# Patient Record
Sex: FEMALE | Race: WHITE | NOT HISPANIC OR LATINO | URBAN - METROPOLITAN AREA
[De-identification: names, ages, dates, MRNs, and addresses within clinical notes are randomized per-mention and may not be internally consistent; named-entity substitution may affect disease eponyms.]

---

## 2017-01-13 ENCOUNTER — EMERGENCY (EMERGENCY)
Facility: HOSPITAL | Age: 67
LOS: 0 days | Discharge: HOME | End: 2017-01-13
Admitting: EMERGENCY MEDICINE
Payer: MEDICARE

## 2017-01-13 DIAGNOSIS — Z79.01 LONG TERM (CURRENT) USE OF ANTICOAGULANTS: ICD-10-CM
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
## 2017-01-13 DIAGNOSIS — S00.81XA ABRASION OF OTHER PART OF HEAD, INITIAL ENCOUNTER: ICD-10-CM

## 2017-01-13 DIAGNOSIS — Z91.81 HISTORY OF FALLING: ICD-10-CM

## 2017-01-13 DIAGNOSIS — Z86.73 PERSONAL HISTORY OF TRANSIENT ISCHEMIC ATTACK (TIA), AND CEREBRAL INFARCTION WITHOUT RESIDUAL DEFICITS: ICD-10-CM
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
## 2017-01-13 PROCEDURE — 70450 CT HEAD/BRAIN W/O DYE: CPT | Mod: 26

## 2017-01-13 PROCEDURE — 72125 CT NECK SPINE W/O DYE: CPT | Mod: 26

## 2017-01-13 PROCEDURE — 99285 EMERGENCY DEPT VISIT HI MDM: CPT | Mod: 25

## 2017-02-14 ENCOUNTER — APPOINTMENT (OUTPATIENT)
Dept: NEUROLOGY | Facility: CLINIC | Age: 67
End: 2017-02-14

## 2017-02-14 VITALS
RESPIRATION RATE: 16 BRPM | WEIGHT: 210 LBS | SYSTOLIC BLOOD PRESSURE: 130 MMHG | DIASTOLIC BLOOD PRESSURE: 70 MMHG | BODY MASS INDEX: 32.96 KG/M2 | HEART RATE: 72 BPM | HEIGHT: 67 IN

## 2017-02-14 DIAGNOSIS — Z86.79 PERSONAL HISTORY OF OTHER DISEASES OF THE CIRCULATORY SYSTEM: ICD-10-CM

## 2017-02-14 DIAGNOSIS — Z87.898 PERSONAL HISTORY OF OTHER SPECIFIED CONDITIONS: ICD-10-CM

## 2017-02-14 DIAGNOSIS — Z87.891 PERSONAL HISTORY OF NICOTINE DEPENDENCE: ICD-10-CM

## 2017-02-14 DIAGNOSIS — I50.32 CHRONIC DIASTOLIC (CONGESTIVE) HEART FAILURE: ICD-10-CM

## 2017-02-14 DIAGNOSIS — J96.12 CHRONIC RESPIRATORY FAILURE WITH HYPERCAPNIA: ICD-10-CM

## 2017-02-14 DIAGNOSIS — Z86.39 PERSONAL HISTORY OF OTHER ENDOCRINE, NUTRITIONAL AND METABOLIC DISEASE: ICD-10-CM

## 2017-02-14 DIAGNOSIS — Z82.3 FAMILY HISTORY OF STROKE: ICD-10-CM

## 2017-02-14 DIAGNOSIS — Z77.29 CONTACT WITH AND (SUSPECTED) EXPOSURE TO OTHER HAZARDOUS SUBSTANCES: ICD-10-CM

## 2017-02-14 RX ORDER — LISINOPRIL 20 MG/1
20 TABLET ORAL
Refills: 0 | Status: ACTIVE | COMMUNITY

## 2017-02-14 RX ORDER — LEVOTHYROXINE SODIUM 0.12 MG/1
125 TABLET ORAL
Refills: 0 | Status: ACTIVE | COMMUNITY

## 2017-02-14 RX ORDER — SACUBITRIL AND VALSARTAN 49; 51 MG/1; MG/1
49-51 TABLET, FILM COATED ORAL
Qty: 60 | Refills: 0 | Status: ACTIVE | COMMUNITY
Start: 2017-02-10

## 2017-02-14 RX ORDER — ATORVASTATIN CALCIUM 40 MG/1
40 TABLET, FILM COATED ORAL
Refills: 0 | Status: ACTIVE | COMMUNITY

## 2017-02-14 RX ORDER — ASPIRIN 81 MG
81 TABLET, DELAYED RELEASE (ENTERIC COATED) ORAL
Refills: 0 | Status: ACTIVE | COMMUNITY

## 2017-02-14 RX ORDER — LEVOTHYROXINE SODIUM 0.15 MG/1
150 TABLET ORAL
Qty: 30 | Refills: 0 | Status: DISCONTINUED | COMMUNITY
Start: 2016-10-14

## 2017-02-14 RX ORDER — ALBUTEROL 90 MCG
90 AEROSOL (GRAM) INHALATION
Refills: 0 | Status: ACTIVE | COMMUNITY

## 2017-02-14 RX ORDER — FUROSEMIDE 40 MG/1
40 TABLET ORAL
Refills: 0 | Status: DISCONTINUED | COMMUNITY
End: 2017-02-14

## 2017-02-14 RX ORDER — CLOPIDOGREL 75 MG/1
75 TABLET, FILM COATED ORAL
Refills: 0 | Status: DISCONTINUED | COMMUNITY
End: 2017-02-14

## 2017-02-14 RX ORDER — ALBUTEROL SULFATE 90 UG/1
108 (90 BASE) AEROSOL, METERED RESPIRATORY (INHALATION)
Qty: 18 | Refills: 0 | Status: ACTIVE | COMMUNITY
Start: 2016-10-24

## 2017-02-14 RX ORDER — CARVEDILOL 3.12 MG/1
3.12 TABLET, FILM COATED ORAL
Refills: 0 | Status: ACTIVE | COMMUNITY

## 2017-02-14 RX ORDER — LEVOTHYROXINE SODIUM 0.17 MG/1
175 TABLET ORAL
Qty: 30 | Refills: 0 | Status: ACTIVE | COMMUNITY
Start: 2016-10-25

## 2017-02-14 RX ORDER — ROSUVASTATIN CALCIUM 20 MG/1
20 TABLET, FILM COATED ORAL
Qty: 30 | Refills: 0 | Status: ACTIVE | COMMUNITY
Start: 2017-01-03

## 2017-02-14 RX ORDER — CARVEDILOL 6.25 MG/1
6.25 TABLET, FILM COATED ORAL
Qty: 60 | Refills: 0 | Status: ACTIVE | COMMUNITY
Start: 2017-01-26

## 2017-02-14 RX ORDER — WARFARIN 5 MG/1
5 TABLET ORAL
Qty: 30 | Refills: 0 | Status: ACTIVE | COMMUNITY
Start: 2017-01-03

## 2017-04-19 ENCOUNTER — APPOINTMENT (OUTPATIENT)
Dept: CARDIOLOGY | Facility: CLINIC | Age: 67
End: 2017-04-19

## 2017-04-27 ENCOUNTER — APPOINTMENT (OUTPATIENT)
Dept: CARDIOLOGY | Facility: CLINIC | Age: 67
End: 2017-04-27

## 2017-05-01 ENCOUNTER — APPOINTMENT (OUTPATIENT)
Dept: CARDIOLOGY | Facility: CLINIC | Age: 67
End: 2017-05-01

## 2017-05-04 ENCOUNTER — APPOINTMENT (OUTPATIENT)
Dept: CARDIOLOGY | Facility: CLINIC | Age: 67
End: 2017-05-04

## 2017-05-04 DIAGNOSIS — Z79.01 ENCOUNTER FOR THERAPEUTIC DRUG LVL MONITORING: ICD-10-CM

## 2017-05-04 DIAGNOSIS — Z51.81 ENCOUNTER FOR THERAPEUTIC DRUG LVL MONITORING: ICD-10-CM

## 2017-05-12 ENCOUNTER — APPOINTMENT (OUTPATIENT)
Dept: CARDIOLOGY | Facility: CLINIC | Age: 67
End: 2017-05-12

## 2017-06-26 ENCOUNTER — INPATIENT (INPATIENT)
Facility: HOSPITAL | Age: 67
LOS: 3 days | Discharge: HOME HEALTH | End: 2017-06-30
Attending: HOSPITALIST | Admitting: HOSPITALIST
Payer: MEDICARE

## 2017-06-26 VITALS
DIASTOLIC BLOOD PRESSURE: 92 MMHG | RESPIRATION RATE: 20 BRPM | HEIGHT: 67 IN | WEIGHT: 199.96 LBS | HEART RATE: 80 BPM | SYSTOLIC BLOOD PRESSURE: 226 MMHG | TEMPERATURE: 98 F | OXYGEN SATURATION: 97 %

## 2017-06-26 DIAGNOSIS — Z98.891 HISTORY OF UTERINE SCAR FROM PREVIOUS SURGERY: Chronic | ICD-10-CM

## 2017-06-26 DIAGNOSIS — I25.10 ATHEROSCLEROTIC HEART DISEASE OF NATIVE CORONARY ARTERY WITHOUT ANGINA PECTORIS: ICD-10-CM

## 2017-06-26 LAB
ANION GAP SERPL CALC-SCNC: 6 MMOL/L — SIGNIFICANT CHANGE UP (ref 5–17)
APTT BLD: 33.3 SEC — SIGNIFICANT CHANGE UP (ref 27.5–37.4)
BLD GP AB SCN SERPL QL: SIGNIFICANT CHANGE UP
BUN SERPL-MCNC: 17 MG/DL — SIGNIFICANT CHANGE UP (ref 7–23)
CALCIUM SERPL-MCNC: 10.5 MG/DL — HIGH (ref 8.5–10.1)
CHLORIDE SERPL-SCNC: 102 MMOL/L — SIGNIFICANT CHANGE UP (ref 96–108)
CO2 SERPL-SCNC: 28 MMOL/L — SIGNIFICANT CHANGE UP (ref 22–31)
CREAT SERPL-MCNC: 0.75 MG/DL — SIGNIFICANT CHANGE UP (ref 0.5–1.3)
GLUCOSE SERPL-MCNC: 113 MG/DL — HIGH (ref 70–99)
HCT VFR BLD CALC: 44 % — SIGNIFICANT CHANGE UP (ref 34.5–45)
HGB BLD-MCNC: 14.7 G/DL — SIGNIFICANT CHANGE UP (ref 11.5–15.5)
INR BLD: 1.07 RATIO — SIGNIFICANT CHANGE UP (ref 0.88–1.16)
MCHC RBC-ENTMCNC: 29.6 PG — SIGNIFICANT CHANGE UP (ref 27–34)
MCHC RBC-ENTMCNC: 33.5 GM/DL — SIGNIFICANT CHANGE UP (ref 32–36)
MCV RBC AUTO: 88.4 FL — SIGNIFICANT CHANGE UP (ref 80–100)
PLATELET # BLD AUTO: 390 K/UL — SIGNIFICANT CHANGE UP (ref 150–400)
POTASSIUM SERPL-MCNC: 4.4 MMOL/L — SIGNIFICANT CHANGE UP (ref 3.5–5.3)
POTASSIUM SERPL-SCNC: 4.4 MMOL/L — SIGNIFICANT CHANGE UP (ref 3.5–5.3)
PROTHROM AB SERPL-ACNC: 11.6 SEC — SIGNIFICANT CHANGE UP (ref 9.8–12.7)
RBC # BLD: 4.97 M/UL — SIGNIFICANT CHANGE UP (ref 3.8–5.2)
RBC # FLD: 12.6 % — SIGNIFICANT CHANGE UP (ref 10.3–14.5)
SODIUM SERPL-SCNC: 136 MMOL/L — SIGNIFICANT CHANGE UP (ref 135–145)
TYPE + AB SCN PNL BLD: SIGNIFICANT CHANGE UP
WBC # BLD: 9.9 K/UL — SIGNIFICANT CHANGE UP (ref 3.8–10.5)
WBC # FLD AUTO: 9.9 K/UL — SIGNIFICANT CHANGE UP (ref 3.8–10.5)

## 2017-06-26 PROCEDURE — 70547 MR ANGIOGRAPHY NECK W/O DYE: CPT | Mod: 26

## 2017-06-26 PROCEDURE — 70450 CT HEAD/BRAIN W/O DYE: CPT | Mod: 26

## 2017-06-26 PROCEDURE — 70551 MRI BRAIN STEM W/O DYE: CPT | Mod: 26

## 2017-06-26 PROCEDURE — 93010 ELECTROCARDIOGRAM REPORT: CPT

## 2017-06-26 PROCEDURE — 99291 CRITICAL CARE FIRST HOUR: CPT

## 2017-06-26 RX ORDER — LISINOPRIL 2.5 MG/1
20 TABLET ORAL DAILY
Qty: 0 | Refills: 0 | Status: DISCONTINUED | OUTPATIENT
Start: 2017-06-26 | End: 2017-06-26

## 2017-06-26 RX ORDER — ATORVASTATIN CALCIUM 80 MG/1
80 TABLET, FILM COATED ORAL AT BEDTIME
Qty: 0 | Refills: 0 | Status: DISCONTINUED | OUTPATIENT
Start: 2017-06-26 | End: 2017-06-30

## 2017-06-26 RX ORDER — ACETAMINOPHEN 500 MG
650 TABLET ORAL EVERY 6 HOURS
Qty: 0 | Refills: 0 | Status: DISCONTINUED | OUTPATIENT
Start: 2017-06-26 | End: 2017-06-30

## 2017-06-26 RX ORDER — ASPIRIN/CALCIUM CARB/MAGNESIUM 324 MG
325 TABLET ORAL DAILY
Qty: 0 | Refills: 0 | Status: DISCONTINUED | OUTPATIENT
Start: 2017-06-26 | End: 2017-06-30

## 2017-06-26 RX ORDER — FUROSEMIDE 40 MG
20 TABLET ORAL DAILY
Qty: 0 | Refills: 0 | Status: DISCONTINUED | OUTPATIENT
Start: 2017-06-26 | End: 2017-06-30

## 2017-06-26 RX ORDER — SACUBITRIL AND VALSARTAN 24; 26 MG/1; MG/1
1 TABLET, FILM COATED ORAL
Qty: 0 | Refills: 0 | Status: DISCONTINUED | OUTPATIENT
Start: 2017-06-26 | End: 2017-06-30

## 2017-06-26 RX ORDER — LABETALOL HCL 100 MG
10 TABLET ORAL ONCE
Qty: 0 | Refills: 0 | Status: COMPLETED | OUTPATIENT
Start: 2017-06-26 | End: 2017-06-26

## 2017-06-26 RX ORDER — HYDRALAZINE HCL 50 MG
10 TABLET ORAL ONCE
Qty: 0 | Refills: 0 | Status: COMPLETED | OUTPATIENT
Start: 2017-06-26 | End: 2017-06-26

## 2017-06-26 RX ORDER — WARFARIN SODIUM 2.5 MG/1
5 TABLET ORAL DAILY
Qty: 0 | Refills: 0 | Status: DISCONTINUED | OUTPATIENT
Start: 2017-06-26 | End: 2017-06-27

## 2017-06-26 RX ORDER — ALBUTEROL 90 UG/1
2 AEROSOL, METERED ORAL EVERY 6 HOURS
Qty: 0 | Refills: 0 | Status: DISCONTINUED | OUTPATIENT
Start: 2017-06-26 | End: 2017-06-30

## 2017-06-26 RX ORDER — CLOPIDOGREL BISULFATE 75 MG/1
75 TABLET, FILM COATED ORAL DAILY
Qty: 0 | Refills: 0 | Status: DISCONTINUED | OUTPATIENT
Start: 2017-06-26 | End: 2017-06-30

## 2017-06-26 RX ORDER — SODIUM CHLORIDE 9 MG/ML
1000 INJECTION INTRAMUSCULAR; INTRAVENOUS; SUBCUTANEOUS
Qty: 0 | Refills: 0 | Status: DISCONTINUED | OUTPATIENT
Start: 2017-06-26 | End: 2017-06-26

## 2017-06-26 RX ORDER — CARVEDILOL PHOSPHATE 80 MG/1
6.25 CAPSULE, EXTENDED RELEASE ORAL EVERY 12 HOURS
Qty: 0 | Refills: 0 | Status: DISCONTINUED | OUTPATIENT
Start: 2017-06-26 | End: 2017-06-30

## 2017-06-26 RX ORDER — LEVOTHYROXINE SODIUM 125 MCG
175 TABLET ORAL DAILY
Qty: 0 | Refills: 0 | Status: DISCONTINUED | OUTPATIENT
Start: 2017-06-26 | End: 2017-06-30

## 2017-06-26 RX ADMIN — Medication 650 MILLIGRAM(S): at 17:27

## 2017-06-26 RX ADMIN — Medication 10 MILLIGRAM(S): at 16:41

## 2017-06-26 RX ADMIN — SACUBITRIL AND VALSARTAN 1 TABLET(S): 24; 26 TABLET, FILM COATED ORAL at 12:31

## 2017-06-26 RX ADMIN — CARVEDILOL PHOSPHATE 6.25 MILLIGRAM(S): 80 CAPSULE, EXTENDED RELEASE ORAL at 12:31

## 2017-06-26 RX ADMIN — Medication 10 MILLIGRAM(S): at 09:52

## 2017-06-26 RX ADMIN — WARFARIN SODIUM 5 MILLIGRAM(S): 2.5 TABLET ORAL at 22:36

## 2017-06-26 RX ADMIN — Medication 175 MICROGRAM(S): at 17:38

## 2017-06-26 RX ADMIN — Medication 325 MILLIGRAM(S): at 16:23

## 2017-06-26 RX ADMIN — ATORVASTATIN CALCIUM 80 MILLIGRAM(S): 80 TABLET, FILM COATED ORAL at 22:36

## 2017-06-26 RX ADMIN — CLOPIDOGREL BISULFATE 75 MILLIGRAM(S): 75 TABLET, FILM COATED ORAL at 16:23

## 2017-06-26 NOTE — H&P ADULT - NSHPREVIEWOFSYSTEMS_GEN_ALL_CORE
CONSTITUTIONAL: No fever, weight loss, or fatigue  EYES: No eye pain, visual disturbances, or discharge  ENMT:  No difficulty hearing, tinnitus, vertigo; No sinus or throat pain  NECK: No pain or stiffness  BREASTS: No pain, masses, or nipple discharge  RESPIRATORY: + shortness of breath upon exertion  CARDIOVASCULAR: No chest pain, palpitations, dizziness, or leg swelling  GASTROINTESTINAL: No abdominal or epigastric pain. No nausea, vomiting, or hematemesis; No diarrhea or constipation. No melena or hematochezia.  GENITOURINARY: No dysuria, frequency, hematuria, or incontinence  NEUROLOGICAL: No headaches, memory loss, loss of strength, numbness, or tremors  SKIN: No itching, burning, rashes, or lesions   LYMPH NODES: No enlarged glands  ENDOCRINE: No heat or cold intolerance; No hair loss  MUSCULOSKELETAL: No joint pain or swelling; No muscle, back, or extremity pain  PSYCHIATRIC: No depression, anxiety, mood swings, or difficulty sleeping  HEME/LYMPH: No easy bruising, or bleeding gums  ALLERGY AND IMMUNOLOGIC: No hives or eczema

## 2017-06-26 NOTE — CONSULT NOTE ADULT - ASSESSMENT
Patient is a  66yr old female with a PMH of HTN, systolic & diastolic CHF EF 30-35%, pulmonary hypertension, chronic hypercarbic respiratory failure, CVA with left sided weakness, A-Fib (pt was on coumadin which was held for today's procedure), an outpatient MORGAN demonstrated mobile atheroma in the aortic arch moderate to severe mitral regurgitation and moderate to severe tricuspid regurgitation. She was admitted today for a scheduled cardiac cath and three stents were placed.    Pt c/o double vision which resolves when one eye is covered, very mild, she also stated her dysmetria with her left arm is from her prior stroke in December. CT head was negative for acute stroke or hemorrhage.    # Double vision, possible TIA vs side effect of Fentanyl   -Restart Coumadin as per Cardiology  -Continue Aspirin and atorvastatin   -MRI of head if symptoms do not resolve 66yr old female with a PMH of HTN, systolic & diastolic CHF EF 30-35%, pulmonary hypertension, chronic hypercarbic respiratory failure, CVA with left sided weakness, A-Fib (pt was on coumadin which was held for today's procedure), mobile atheroma in the AOA, severe tricuspid regurgitation; S/P cardiac cath and three stents around noontime today.    Pt c/o double vision, cold stroke called; noted to have dysmetria her left arm (old from prior stroke in December) NIHSS - 1. CT head was negative for acute stroke or hemorrhage.    # Double vision, possible TIA or ischemic / cranial neuropathy; or narcotic effect; TPA not given as the sx are minimal and imroving.     -Restart Coumadin as per Cardiology  -Continue Aspirin and atorvastatin   -MRI of head if symptoms recur /persist    Neurology attending  ----------------------------------  Pt seen and examined.  Agree with the above decision and plan.     D/W pt and staff

## 2017-06-26 NOTE — PROGRESS NOTE ADULT - SUBJECTIVE AND OBJECTIVE BOX
Responded to cath lab recovery for code stroke.  pt is s/p PCI    67 y/o female with HTN, Pulm HTN, CVA with R sided residual on coumadin and CAD underwent VERNON to RCA and OM1--EF 55% c/o "blurred vision/double vision" post cath.  On arrival, neuro team at bedside, Non focal exam, awake and alert.  Pt going for HCT.  Attending neuro to be contacted by YEVGENIY khan.  Slightly hypertensive .  Denies CP or SOB.      ROS o/w negative   PMH as above  Meds reviewed  ALL NKDA  No smoking    IMP:    R/O CVA    Suggest:    Neuro w/u in progress--observe in ICU if pt candidate for TPA o/w admit as per primary team

## 2017-06-26 NOTE — H&P ADULT - PMH
Atheroma    COPD (chronic obstructive pulmonary disease)    CVA (cerebral vascular accident)    Diastolic congestive heart failure    HTN (hypertension)    Hypothyroidism    Moderate to severe mitral regurgitation    Obesity    Pulmonary hypertension    Severe tricuspid regurgitation    Systolic heart failure

## 2017-06-26 NOTE — CHART NOTE - NSCHARTNOTEFT_GEN_A_CORE
Nurse Practitioner Progress note:     HPI:  66yr old female PMH HTN, systolic & diastolic CHF, pulmonary hypertension, chronic hypercarbic respiratory failure, CVA with left sided weakness, echo 12/16 revealed EF of 30-35%, MORGAN demonstrated mobile atheroma in the aortic arch moderate to severe mitral regurgitation and moderate to severe tricuspid regurgitation. Repeat echo 4/2017 revealed improved EF and improvement of tricuspid regurgitation. Pt. had c/o dyspnea with moderate exertion, she underwent a nuclear stress test which revealed moderate to large sized moderately severe inferior lateral wall ischemia. Pt. now referred for [facility] for further evaluation. Pt. denies chest pain, palpitations, lightheadedness, dizziness, chills, fever.       PHYSICAL EXAM:  Neurologic: Non-focal, AxOx3.  No neuro deficits  Vascular: Peripheral pulses palpable 2+ bilaterally  Procedure Site: Rt. groin perclose closure device noted site benign soft no bleeding no hematoma +1PP    PROCEDURE RESULTS: S/P VERNON to prox. RCA, VERNON to mid RCA, VERNON to OM1 EF 55% normal left ventricular systolic function    ASSESSMENT/PLAN: 	  -Admit to CICU  -VS, labs, diet, activity as per PCI orders  -IV hydration  -Encourage PO fluids  -ASA 325mg  -Plavix 75mg  -Lisinopril 20mg  -Coreg 6.25mg Q12hr  -Crestor 20mg   -Resume coumadin 5mg tonight   -Plan of care D/W pt. and MD  -Discussed therapeutic lifestyle changes to reduce risk factors such as following a cardiac diet, weight loss, maintaining a healthy weight, exercise, smoking cessation, medication compliance, and regular follow-up  with MD to know our numbers (BP, cholesterol, weight, and glucose  -If pt. remains stable overnight possible D/C in AM  -Follow-up AM labs/EKG/site check  -Follow-up with attending Nurse Practitioner Progress note:     HPI:  66yr old female PMH HTN, systolic & diastolic CHF, pulmonary hypertension, chronic hypercarbic respiratory failure, CVA with left sided weakness, echo 12/16 revealed EF of 30-35%, MORGAN demonstrated mobile atheroma in the aortic arch moderate to severe mitral regurgitation and moderate to severe tricuspid regurgitation. Repeat echo 4/2017 revealed improved EF and improvement of tricuspid regurgitation. Pt. had c/o dyspnea with moderate exertion, she underwent a nuclear stress test which revealed moderate to large sized moderately severe inferior lateral wall ischemia. Pt. now referred for [facility] for further evaluation. Pt. denies chest pain, palpitations, lightheadedness, dizziness, chills, fever.       PHYSICAL EXAM:  Neurologic: Non-focal, AxOx3.  No neuro deficits  Vascular: Peripheral pulses palpable 2+ bilaterally  Procedure Site: Rt. groin perclose closure device noted site benign soft no bleeding no hematoma +1PP    PROCEDURE RESULTS: S/P VERNON to prox. RCA, VERNON to mid RCA, VERNON to OM1 EF 55% normal left ventricular systolic function    ASSESSMENT/PLAN: 	  -Admit to CICU  -VS, labs, diet, activity as per PCI orders  -IV hydration  -Encourage PO fluids  -ASA 325mg  -Plavix 75mg  -Entresto 97-103mg  -Coreg 6.25mg Q12hr  -Crestor 20mg   -Resume coumadin 5mg tonight   -Plan of care D/W pt. and MD  -Discussed therapeutic lifestyle changes to reduce risk factors such as following a cardiac diet, weight loss, maintaining a healthy weight, exercise, smoking cessation, medication compliance, and regular follow-up  with MD to know our numbers (BP, cholesterol, weight, and glucose  -If pt. remains stable overnight possible D/C in AM  -Follow-up AM labs/EKG/site check  -Follow-up with attending Nurse Practitioner Progress note:     HPI:  66yr old female PMH HTN, systolic & diastolic CHF, pulmonary hypertension, chronic hypercarbic respiratory failure, CVA with left sided weakness, echo 12/16 revealed EF of 30-35%, MORGAN demonstrated mobile atheroma in the aortic arch moderate to severe mitral regurgitation and moderate to severe tricuspid regurgitation. Repeat echo 4/2017 revealed improved EF and improvement of tricuspid regurgitation. Pt. had c/o dyspnea with moderate exertion, she underwent a nuclear stress test which revealed moderate to large sized moderately severe inferior lateral wall ischemia. Pt. now referred for [facility] for further evaluation. Pt. denies chest pain, palpitations, lightheadedness, dizziness, chills, fever.       PHYSICAL EXAM:  Neurologic: Non-focal, AxOx3.  No neuro deficits  Vascular: Peripheral pulses palpable 2+ bilaterally  Procedure Site: Rt. groin perclose closure device noted site benign soft no bleeding no hematoma +1PP    PROCEDURE RESULTS: S/P VERNON to prox. RCA, VERNON to mid RCA, VERNON to OM1 EF 55% normal left ventricular systolic function    ASSESSMENT/PLAN: 	  -Admit to in-patient CICU  -VS, labs, diet, activity as per PCI orders  -IV hydration  -Encourage PO fluids  -ASA 325mg  -Plavix 75mg  -Entresto 97-103mg  -Coreg 6.25mg Q12hr  -Crestor 20mg   -Resume coumadin 5mg tonight   -Plan of care D/W pt. and MD  -Discussed therapeutic lifestyle changes to reduce risk factors such as following a cardiac diet, weight loss, maintaining a healthy weight, exercise, smoking cessation, medication compliance, and regular follow-up  with MD to know our numbers (BP, cholesterol, weight, and glucose  -If pt. remains stable overnight possible D/C in AM  -Follow-up AM labs/EKG/site check  -Follow-up with attending Nurse Practitioner Progress note:     HPI:  66yr old female PMH HTN, systolic & diastolic CHF, pulmonary hypertension, chronic hypercarbic respiratory failure, CVA with left sided weakness, echo 12/16 revealed EF of 30-35%, MORGAN demonstrated mobile atheroma in the aortic arch moderate to severe mitral regurgitation and moderate to severe tricuspid regurgitation. Repeat echo 4/2017 revealed improved EF and improvement of tricuspid regurgitation. Pt. had c/o dyspnea with moderate exertion, she underwent a nuclear stress test which revealed moderate to large sized moderately severe inferior lateral wall ischemia. Pt. now referred for [facility] for further evaluation. Pt. denies chest pain, palpitations, lightheadedness, dizziness, chills, fever.       PHYSICAL EXAM:  Neurologic: Non-focal, AxOx3.  No neuro deficits  Vascular: Peripheral pulses palpable 2+ bilaterally  Procedure Site: Rt. groin perclose closure device noted site benign soft no bleeding no hematoma +1PP    PROCEDURE RESULTS: S/P VERNON to prox. RCA, VERNON to mid RCA, VERNON to OM1 EF 55% normal left ventricular systolic function    ASSESSMENT/PLAN:   66yr old female PMH HTN, systolic & diastolic CHF, pulmonary hypertension, chronic hypercarbic respiratory failure, CVA with left sided weakness, echo 12/16 revealed EF of 30-35%, MORGAN demonstrated mobile atheroma in the aortic arch moderate to severe mitral regurgitation and moderate to severe tricuspid regurgitation. Repeat echo 4/2017 revealed improved EF and improvement of tricuspid regurgitation. Pt. had c/o dyspnea with moderate exertion, she underwent a nuclear stress test which revealed moderate to large sized moderately severe inferior lateral wall ischemia. S/P LHC 	  -Admit to in-patient CICU  -VS, labs, diet, activity as per PCI orders  -IV hydration  -Encourage PO fluids  -ASA 325mg  -Plavix 75mg  -Entresto 97-103mg  -Coreg 6.25mg Q12hr  -Crestor 20mg   -Resume coumadin 5mg tonight   -Plan of care D/W pt. and MD  -Discussed therapeutic lifestyle changes to reduce risk factors such as following a cardiac diet, weight loss, maintaining a healthy weight, exercise, smoking cessation, medication compliance, and regular follow-up  with MD to know our numbers (BP, cholesterol, weight, and glucose  -If pt. remains stable overnight possible D/C in AM  -Follow-up AM labs/EKG/site check  -Follow-up with attending Nurse Practitioner Progress note:     HPI:  66yr old female PMH HTN, systolic & diastolic CHF, pulmonary hypertension, chronic hypercarbic respiratory failure, CVA with left sided weakness, echo 12/16 revealed EF of 30-35%, MORGAN demonstrated mobile atheroma in the aortic arch moderate to severe mitral regurgitation and moderate to severe tricuspid regurgitation. Repeat echo 4/2017 revealed improved EF and improvement of tricuspid regurgitation. Pt. had c/o dyspnea with moderate exertion, she underwent a nuclear stress test which revealed moderate to large sized moderately severe inferior lateral wall ischemia. Pt. now referred for [facility] for further evaluation. Pt. denies chest pain, palpitations, lightheadedness, dizziness, chills, fever.       PHYSICAL EXAM:  Neurologic: Non-focal, AxOx3.  No neuro deficits  Vascular: Peripheral pulses palpable 2+ bilaterally  Procedure Site: Rt. groin perclose closure device noted site benign soft no bleeding no hematoma +1PP    PROCEDURE RESULTS: S/P VERNON to prox. RCA, VERNON to mid RCA, VERNON to OM1 EF 55% normal left ventricular systolic function    ASSESSMENT/PLAN:   66yr old female PMH HTN, systolic & diastolic CHF, pulmonary hypertension, chronic hypercarbic respiratory failure, CVA with left sided weakness, echo 12/16 revealed EF of 30-35%, MORGAN demonstrated mobile atheroma in the aortic arch moderate to severe mitral regurgitation and moderate to severe tricuspid regurgitation. Repeat echo 4/2017 revealed improved EF and improvement of tricuspid regurgitation. Pt. had c/o dyspnea with moderate exertion, she underwent a nuclear stress test which revealed moderate to large sized moderately severe inferior lateral wall ischemia. S/P LHC 	  -Admit to in-patient CICU  -VS, labs, diet, activity as per PCI orders  -IV hydration  -Encourage PO fluids  -ASA 325mg  -Plavix 75mg  -Entresto 97-103mg  -Coreg 6.25mg Q12hr  -Crestor 20mg   -Resume coumadin 5mg tonight   -Plan of care D/W pt. and MD  -Discussed therapeutic lifestyle changes to reduce risk factors such as following a cardiac diet, weight loss, maintaining a healthy weight, exercise, smoking cessation, medication compliance, and regular follow-up  with MD to know our numbers (BP, cholesterol, weight, and glucose)  -If pt. remains stable overnight possible D/C in AM  -Follow-up AM labs/EKG/site check  -Follow-up with attending

## 2017-06-26 NOTE — CONSULT NOTE ADULT - SUBJECTIVE AND OBJECTIVE BOX
CC: double vision, code stroke     HPI: Patient is a  66yr old female with a PMH of HTN, systolic & diastolic CHF EF 30-35%, pulmonary hypertension, chronic hypercarbic respiratory failure, CVA with left sided weakness, A-Fib (pt was on coumadin which was held for today's procedure), an outpatient MORGAN demonstrated mobile atheroma in the aortic arch moderate to severe mitral regurgitation and moderate to severe tricuspid regurgitation. She was admitted today for a scheduled cardiac cath and three stents were placed.     At 2:30 pm the patient noticed that she was having difficulty with her vision. She noticed she was seeing double. A CODE STROKE was called at 2:56, NIHSS 1 for LUE dysmetria. CT head was negative for acute stroke or hemorrhage. After CT the patient was transferred to the CCU for post-cath observation. Dr. Rodrgiuez was contacted and  he stated the patient did require large amounts of fentanyl and versed during the procedure. No tPA was given due to mild symptoms would could have been caused by narcotic use     PAST MEDICAL & SURGICAL HISTORY:  Hypothyroidism  Atheroma  Atrial Fibrillation on coumadin   COPD (chronic obstructive pulmonary disease)  Severe tricuspid regurgitation  Moderate to severe mitral regurgitation  Pulmonary hypertension  Obesity  CVA (cerebral vascular accident)  Diastolic congestive heart failure  Systolic heart failure  HTN (hypertension)  s/p   s/p cardiac cath with stent placement X3     FAMILY HISTORY: non-contributory     Social Hx:  Pt lives at home with family, retired, d/c smoking 25 years ago, occ ETOH, occ marijuana use     MEDICATIONS  (STANDING):  aspirin 325milliGRAM(s) Oral daily  clopidogrel Tablet 75milliGRAM(s) Oral daily  sacubitril 97 mG/valsartan 103 mG 1Tablet(s) Oral two times a day  warfarin 5milliGRAM(s) Oral daily  atorvastatin 80milliGRAM(s) Oral at bedtime  carvedilol 6.25milliGRAM(s) Oral every 12 hours  furosemide    Tablet 20milliGRAM(s) Oral daily  levothyroxine 175MICROGram(s) Oral daily  sodium chloride 0.9%. 1000milliLiter(s) IV Continuous <Continuous>     Allergies: No Known Drug Allergies; ALLERGIC TO DOG/CAT/HORSE HAIR, PLANTS:     ROS: Pertinent positives in HPI, all other ROS were reviewed and are negative.      Vital Signs Last 24 Hrs  T(C): 36.4, Max: 36.4 ( @ 08:32)  T(F): 97.6, Max: 97.6 ( @ 08:32)  HR: 65 (58 - 80)  BP: 174/64 (151/94 - 226/92)  RR: 16 (16 - 20)  SpO2: 97% (7% - 98%)    PHYSICAL EXAM   Constitutional: awake and alert.  HEENT: PERRLA, EOMI  Neck: Supple.  Respiratory: Breath sounds are clear bilaterally  Cardiovascular: S1 and S2, irregular rhythm  Gastrointestinal: soft, nontender  Extremities:  no edema  Vascular: Carotid Bruit - no  Musculoskeletal: no joint swelling/tenderness, no abnormal movements  Skin: No rashes,     Neurological exam:  HF: A x O x 3. Appropriately interactive, normal affect. Speech fluent, No Aphasia or paraphasic errors. Naming /repetition intact   CN: PEARRL, EOMI, VFF, blurry vision at a distance, resolves when one eye is covered, facial sensation normal, no NLFD, tongue midline, Palate moves equally, SCM equal bilaterally  Motor: No pronator drift, Strength 5/5 in all 4 ext, normal bulk and tone, no tremor, rigidity or bradykinesia.    Sens: Intact to light touch / PP/ VS/ JS    Reflexes: Symmetric and normal BJ 2+, BR 2+, KJ 2+, AJ 2+, downgoing toes b/l  Coord: dysmetria with finger to nose  LUE  Gait/Balance: Not tested   NIHSS: 1    Labs:                        14.7   9.9   )-----------( 390      ( 2017 08:30 )             44.0         136  |  102  |  17  ----------------------------<  113<H>  4.4   |  28  |  0.75    Ca    10.5<H>      2017 08:30    PT/INR - ( 2017 08:30 )   PT: 11.6 sec;   INR: 1.07 ratio      PTT - ( 2017 08:30 )  PTT:33.3 sec    RADIOLOGY:  CT HEAD:  IMPRESSION:  focal encephalomalacia and gliosis in the BILATERAL corona   radiata, and RIGHT frontal white matter, sequela of old infarctions.   No   acute infarction is seen. CC: double vision, code stroke     HPI: Patient is a  66yr old female with a PMH of HTN, systolic & diastolic CHF EF 30-35%, pulmonary hypertension, chronic hypercarbic respiratory failure, CVA with residual left sided weakness, A-Fib (pt was on coumadin which was held for today's procedure), was admitted today for a scheduled cardiac cath - three stents were placed.     At 2:30 pm the patient noticed that she was having difficulty with her vision, she noticed seeing double. A CODE STROKE was called at 2:56, NIHSS 1 for LUE dysmetria (old). CT head was negative for acute stroke or hemorrhage. After return from CT head  patient was transferred to the CCU for post-cath observation, her symptoms improved, she continues to have occasional fleeting diplopia, ill-sustained.     Dr. Rodriguez the cardiologist was contacted and  he stated the patient was given large amounts of fentanyl and versed during the procedure. No tpa was given as sx improved, were minimal and possibility of this being narcotic effect    As per cardio an outpatient MORGAN demonstrated mobile atheroma in the aortic arch moderate to severe mitral regurgitation and moderate to severe tricuspid regurgitation; pt also admits following last CVA, she has some residual weakness/dysmetria left side      PAST MEDICAL & SURGICAL HISTORY:  Hypothyroidism  Atheroma  Atrial Fibrillation on coumadin   COPD (chronic obstructive pulmonary disease)  Severe tricuspid regurgitation  Moderate to severe mitral regurgitation  Pulmonary hypertension  Obesity  CVA (cerebral vascular accident)  Diastolic congestive heart failure  Systolic heart failure  HTN (hypertension)  s/p   s/p cardiac cath with stent placement X3     FAMILY HISTORY: non-contributory     Social Hx:  Pt lives at home with family, retired, d/c smoking 25 years ago, occ ETOH, occ marijuana use     MEDICATIONS  (STANDING):  aspirin 325milliGRAM(s) Oral daily  clopidogrel Tablet 75milliGRAM(s) Oral daily  sacubitril 97 mG/valsartan 103 mG 1Tablet(s) Oral two times a day  warfarin 5milliGRAM(s) Oral daily  atorvastatin 80milliGRAM(s) Oral at bedtime  carvedilol 6.25milliGRAM(s) Oral every 12 hours  furosemide    Tablet 20milliGRAM(s) Oral daily  levothyroxine 175MICROGram(s) Oral daily  sodium chloride 0.9%. 1000milliLiter(s) IV Continuous <Continuous>     Allergies: No Known Drug Allergies; ALLERGIC TO DOG/CAT/HORSE HAIR, PLANTS:     ROS: Pertinent positives in HPI, all other ROS were reviewed and are negative.      Vital Signs Last 24 Hrs  T(C): 36.4, Max: 36.4 ( @ 08:32)  T(F): 97.6, Max: 97.6 ( @ 08:32)  HR: 65 (58 - 80)  BP: 174/64 (151/94 - 226/92)  RR: 16 (16 - 20)  SpO2: 97% (7% - 98%)    PHYSICAL EXAM   Constitutional: awake and alert.  HEENT: No neck masses  Neck: Supple.  Respiratory: Breath sounds are clear bilaterally  Cardiovascular: S1 and S2, irregular rhythm  Extremities:  no edema  Vascular: Carotid Bruit - no  Musculoskeletal: no joint swelling/tenderness, no abnormal movements  Skin: Right groin dressing,     Neurological exam:  HF: A x O x 3. Speech fluent, No Aphasia or paraphasic errors. Naming /repetition intact   CN: PEARRL, EOMI, VFF, no diplopia during exam, facial sensation normal, no NLFD, tongue midline, Palate moves equally, SCM equal bilaterally  Motor: No pronator drift, Strength 5/5 in all 4 ext, normal bulk and tone, no tremor, rigidity or bradykinesia.    Sens: Intact to light touch / PP/ VS/ JS    Reflexes: Symmetric and normal toes b/l  Coord: dysmetria with finger to nose  LUE  Gait/Balance: Not tested   NIHSS: 1    Labs:                        14.7   9.9   )-----------( 390      ( 2017 08:30 )             44.0         136  |  102  |  17  ----------------------------<  113<H>  4.4   |  28  |  0.75    Ca    10.5<H>      2017 08:30    PT/INR - ( 2017 08:30 )   PT: 11.6 sec;   INR: 1.07 ratio      PTT - ( 2017 08:30 )  PTT:33.3 sec    RADIOLOGY:  CT HEAD:  IMPRESSION:  focal encephalomalacia and gliosis in the BILATERAL corona   radiata, and RIGHT frontal white matter, sequela of old infarctions.   No   acute infarction is seen.

## 2017-06-26 NOTE — H&P ADULT - NSHPLABSRESULTS_GEN_ALL_CORE
Carotid Duplex demonstrated 50-69% bilateral ICA disease.   MRI of the brain demonstrated CVA in the right MCA territory

## 2017-06-26 NOTE — H&P ADULT - ASSESSMENT
66yr old female PMH HTN, systolic & diastolic CHF, pulmonary hypertension, chronic hypercarbic respiratory failure, CVA with left sided weakness, echo 12/16 revealed EF of 30-35%, MORGAN demonstrated mobile atheroma in the aortic arch moderate to severe mitral regurgitation and moderate to severe tricuspid regurgitation. Repeat echo 4/2017 revealed improved EF and improvement of tricuspid regurgitation. Pt. had c/o dyspnea with moderate exertion, she underwent a nuclear stress test which revealed moderate to large sized moderately severe inferior lateral wall ischemia. Pt. now referred for C for further evaluation.   C risks, benefits and alternatives discussed with patient. Risk discussed included, but not limited to MI, stroke, mortality, major bleeding, arrythmia, or infection. Consent obtained and signed educational material provided. Pt. verbalizes and understands pre-procedural instructions.

## 2017-06-26 NOTE — H&P ADULT - NSHPPHYSICALEXAM_GEN_ALL_CORE
GENERAL: NAD, well-groomed, well-developed  HEAD:  Atraumatic, Normocephalic  EYES: EOMI, PERRLA, conjunctiva and sclera clear  ENMT: No tonsillar erythema, exudates, or enlargement; Moist mucous membranes, Good dentition, No lesions  NECK: Supple, No JVD, Normal thyroid  NERVOUS SYSTEM:  Alert & Oriented X3, Good concentration; Motor Strength 5/5 B/L upper and lower extremities; DTRs 2+ intact and symmetric  CHEST/LUNG: Clear to auscultation bilaterally; No rales, rhonchi, wheezing, or rubs  HEART: Regular rate and rhythm; No murmurs, rubs, or gallops  ABDOMEN: Soft, Nontender, Nondistended; Bowel sounds present  EXTREMITIES:  2+ Peripheral Pulses, No clubbing, cyanosis, or edema  LYMPH: No lymphadenopathy noted  SKIN: No rashes or lesions

## 2017-06-26 NOTE — H&P ADULT - HISTORY OF PRESENT ILLNESS
66yr old female PMH HTN, systolic & diastolic CHF, pulmonary hypertension, chronic hypercarbic respiratory failure, CVA with left sided weakness, echo 12/16 revealed EF of 30-35%, MORGAN demonstrated mobile atheroma in the aortic arch moderate to severe mitral regurgitation and moderate to severe tricuspid regurgitation. Repeat echo 4/2017 revealed improved EF and improvement of tricuspid regurgitation. Pt. had c/o dyspnea with moderate exertion, she underwent a nuclear stress test which revealed moderate to large sized moderately severe inferior lateral wall ischemia. Pt. now referred for [facility] for further evaluation. Pt. denies chest pain, palpitations, lightheadedness, dizziness, chills, fever.

## 2017-06-26 NOTE — CHART NOTE - NSCHARTNOTEFT_GEN_A_CORE
Called by RN to evaluate pt. with c/o blurred vision  Patient is a 66y old  Female who presents with a chief complaint of " I have shortness of breath on exertion" (2017 08:47)    Vital Signs Last 24 Hrs  T(C): 36.4, Max: 36.4 ( @ 08:32)  T(F): 97.6, Max: 97.6 ( @ 08:32)  HR: 65 (58 - 80)  BP: 174/64 (151/94 - 226/92)  BP(mean): --  RR: 16 (16 - 20)  SpO2: 97% (7% - 98%)      PT/INR - ( 2017 08:30 )   PT: 11.6 sec;   INR: 1.07 ratio         PTT - ( 2017 08:30 )  PTT:33.3 sec                        14.7   9.9   )-----------( 390      ( 2017 08:30 )             44.0         136  |  102  |  17  ----------------------------<  113<H>  4.4   |  28  |  0.75    Ca    10.5<H>      2017 08:30                                CAPILLARY BLOOD GLUCOSE    aspirin 325milliGRAM(s) Oral daily  clopidogrel Tablet 75milliGRAM(s) Oral daily  sacubitril 97 mG/valsartan 103 mG 1Tablet(s) Oral two times a day  warfarin 5milliGRAM(s) Oral daily  atorvastatin 80milliGRAM(s) Oral at bedtime  carvedilol 6.25milliGRAM(s) Oral every 12 hours  ALBUTerol    90 MICROgram(s) HFA Inhaler 2Puff(s) Inhalation every 6 hours PRN  furosemide    Tablet 20milliGRAM(s) Oral daily  levothyroxine 175MICROGram(s) Oral daily      REVIEW OF SYSTEMS:    RESPIRATORY: No cough, wheezing, chills or hemoptysis; No shortness of breath  CARDIOVASCULAR: No chest pain, palpitations, dizziness, or leg swelling  GASTROINTESTINAL: No abdominal or epigastric pain. No nausea, vomiting, or hematemesis; No diarrhea or constipation. No melena or hematochezia.  GENITOURINARY: No dysuria, frequency, hematuria, or incontinence  NEUROLOGICAL: No headaches, memory loss, loss of strength, numbness, or tremors      PHYSICAL EXAM:    GENERAL: NAD, well-groomed, well-developed  NERVOUS SYSTEM:  Alert & Oriented X3, Good concentration; Motor Strength 5/5 B/L upper and lower extremities; DTRs 2+ intact and symmetric  CHEST/LUNG: Clear to percussion bilaterally; No rales, rhonchi, wheezing, or rubs  HEART: Regular rate and rhythm; No murmurs, rubs, or gallops  ABDOMEN: Soft, Nontender, Nondistended; Bowel sounds present  EXTREMITIES:  2+ Peripheral Pulses, No clubbing, cyanosis, or edema    Assessment: Patient 66y with COMORBIDITY  Atherosclerosis of native coronary artery without angina pectoris  Hypothyroidism  Atheroma  COPD (chronic obstructive pulmonary disease)  Severe tricuspid regurgitation  Moderate to severe mitral regurgitation  Pulmonary hypertension  Obesity  CVA (cerebral vascular accident)  Diastolic congestive heart failure  Systolic heart failure  HTN (hypertension)  History of       Plan:  - Continue current treatment  - Follow up labs  - D/w                       aware and agree with the plan  - Will continue to follow up Called by RN to evaluate pt. with c/o blurred vision    66yr old female PMH HTN, systolic & diastolic CHF, pulmonary hypertension, chronic hypercarbic respiratory failure, CVA with left sided weakness, echo  revealed EF of 30-35%, MORGAN demonstrated mobile atheroma in the aortic arch moderate to severe mitral regurgitation and moderate to severe tricuspid regurgitation. Repeat echo 2017 revealed improved EF and improvement of tricuspid regurgitation. Pt. had c/o dyspnea with moderate exertion, she underwent a nuclear stress test which revealed moderate to large sized moderately severe inferior lateral wall ischemia. S/P [facility] 	              Vital Signs Last 24 Hrs  T(C): 36.4, Max: 36.4 ( @ 08:32)  T(F): 97.6, Max: 97.6 ( @ 08:32)  HR: 65 (58 - 80)  BP: 174/64 (151/94 - 226/92)  BP(mean): --  RR: 16 (16 - 20)  SpO2: 97% (7% - 98%)      PT/INR - ( 2017 08:30 )   PT: 11.6 sec;   INR: 1.07 ratio         PTT - ( 2017 08:30 )  PTT:33.3 sec                        14.7   9.9   )-----------( 390      ( 2017 08:30 )             44.0         136  |  102  |  17  ----------------------------<  113<H>  4.4   |  28  |  0.75    Ca    10.5<H>      2017 08:30                                CAPILLARY BLOOD GLUCOSE    aspirin 325milliGRAM(s) Oral daily  clopidogrel Tablet 75milliGRAM(s) Oral daily  sacubitril 97 mG/valsartan 103 mG 1Tablet(s) Oral two times a day  warfarin 5milliGRAM(s) Oral daily  atorvastatin 80milliGRAM(s) Oral at bedtime  carvedilol 6.25milliGRAM(s) Oral every 12 hours  ALBUTerol    90 MICROgram(s) HFA Inhaler 2Puff(s) Inhalation every 6 hours PRN  furosemide    Tablet 20milliGRAM(s) Oral daily  levothyroxine 175MICROGram(s) Oral daily      REVIEW OF SYSTEMS:    RESPIRATORY: No cough, wheezing, chills or hemoptysis; No shortness of breath  CARDIOVASCULAR: No chest pain, palpitations, dizziness, or leg swelling  GASTROINTESTINAL: No abdominal or epigastric pain. No nausea, vomiting, or hematemesis; No diarrhea or constipation. No melena or hematochezia.  GENITOURINARY: No dysuria, frequency, hematuria, or incontinence  NEUROLOGICAL: No headaches, memory loss, loss of strength, numbness, or tremors      PHYSICAL EXAM:    GENERAL: NAD, well-groomed, well-developed  NERVOUS SYSTEM:  Alert & Oriented X3, Good concentration; Motor Strength 5/5 B/L upper and lower extremities; DTRs 2+ intact and symmetric  CHEST/LUNG: Clear to percussion bilaterally; No rales, rhonchi, wheezing, or rubs  HEART: Regular rate and rhythm; No murmurs, rubs, or gallops  ABDOMEN: Soft, Nontender, Nondistended; Bowel sounds present  EXTREMITIES:  2+ Peripheral Pulses, No clubbing, cyanosis, or edema    Assessment: Patient 66y with COMORBIDITY  Atherosclerosis of native coronary artery without angina pectoris  Hypothyroidism  Atheroma  COPD (chronic obstructive pulmonary disease)  Severe tricuspid regurgitation  Moderate to severe mitral regurgitation  Pulmonary hypertension  Obesity  CVA (cerebral vascular accident)  Diastolic congestive heart failure  Systolic heart failure  HTN (hypertension)  History of       Plan:  - Continue current treatment  - Follow up labs  - D/w                       aware and agree with the plan  - Will continue to follow up Called by RN to evaluate pt. with c/o blurred vision    This is a 66yr old female PMH HTN, systolic & diastolic CHF, pulmonary hypertension, chronic hypercarbic respiratory failure, CVA with left sided weakness, echo  revealed EF of 30-35%, MORGAN demonstrated mobile atheroma in the aortic arch moderate to severe mitral regurgitation and moderate to severe tricuspid regurgitation. Repeat echo 2017 revealed improved EF and improvement of tricuspid regurgitation. Pt. had c/o dyspnea with moderate exertion, she underwent a nuclear stress test which revealed moderate to large sized moderately severe inferior lateral wall ischemia. S/P C S/P VERNON to prox. RCA, VERNON to mid RCA, VERNON to OM1 EF 55% normal left ventricular systolic function	              Vital Signs Last 24 Hrs  T(C): 36.4, Max: 36.4 ( @ 08:32)  T(F): 97.6, Max: 97.6 ( @ 08:32)  HR: 65 (58 - 80)  BP: 174/64 (151/94 - 226/92)  BP(mean): --  RR: 16 (16 - 20)  SpO2: 97% (7% - 98%)      PT/INR - ( 2017 08:30 )   PT: 11.6 sec;   INR: 1.07 ratio         PTT - ( 2017 08:30 )  PTT:33.3 sec                        14.7   9.9   )-----------( 390      ( 2017 08:30 )             44.0         136  |  102  |  17  ----------------------------<  113<H>  4.4   |  28  |  0.75    Ca    10.5<H>      2017 08:30                                CAPILLARY BLOOD GLUCOSE    aspirin 325milliGRAM(s) Oral daily  clopidogrel Tablet 75milliGRAM(s) Oral daily  sacubitril 97 mG/valsartan 103 mG 1Tablet(s) Oral two times a day  warfarin 5milliGRAM(s) Oral daily  atorvastatin 80milliGRAM(s) Oral at bedtime  carvedilol 6.25milliGRAM(s) Oral every 12 hours  ALBUTerol    90 MICROgram(s) HFA Inhaler 2Puff(s) Inhalation every 6 hours PRN  furosemide    Tablet 20milliGRAM(s) Oral daily  levothyroxine 175MICROGram(s) Oral daily      REVIEW OF SYSTEMS:    RESPIRATORY: No cough, wheezing, chills or hemoptysis; No shortness of breath  CARDIOVASCULAR: No chest pain, palpitations, dizziness, or leg swelling  GASTROINTESTINAL: No abdominal or epigastric pain. No nausea, vomiting, or hematemesis; No diarrhea or constipation. No melena or hematochezia.  GENITOURINARY: No dysuria, frequency, hematuria, or incontinence  NEUROLOGICAL: No headaches, memory loss, loss of strength, numbness, or tremors      PHYSICAL EXAM:    GENERAL: NAD, well-groomed, well-developed  NERVOUS SYSTEM:  Alert & Oriented X3, Good concentration; Motor Strength 5/5 B/L upper and lower extremities; DTRs 2+ intact and symmetric  CHEST/LUNG: Clear to percussion bilaterally; No rales, rhonchi, wheezing, or rubs  HEART: Regular rate and rhythm; No murmurs, rubs, or gallops  ABDOMEN: Soft, Nontender, Nondistended; Bowel sounds present  EXTREMITIES:  2+ Peripheral Pulses, No clubbing, cyanosis, or edema    Assessment: Patient 66y with COMORBIDITY  Atherosclerosis of native coronary artery without angina pectoris  Hypothyroidism  Atheroma  COPD (chronic obstructive pulmonary disease)  Severe tricuspid regurgitation  Moderate to severe mitral regurgitation  Pulmonary hypertension  Obesity  CVA (cerebral vascular accident)  Diastolic congestive heart failure  Systolic heart failure  HTN (hypertension)  History of       Plan:  - Continue current treatment  - Follow up labs  - D/w                       aware and agree with the plan  - Will continue to follow up Called by RN to evaluate pt. with c/o blurred vision    This is a 66yr old female PMH HTN, systolic & diastolic CHF, pulmonary hypertension, chronic hypercarbic respiratory failure, CVA with left sided weakness, echo 12/16 revealed EF of 30-35%, MORGAN demonstrated mobile atheroma in the aortic arch moderate to severe mitral regurgitation and moderate to severe tricuspid regurgitation. Repeat echo 4/2017 revealed improved EF and improvement of tricuspid regurgitation. Pt. had c/o dyspnea with moderate exertion, she underwent a nuclear stress test which revealed moderate to large sized moderately severe inferior lateral wall ischemia. S/P C S/P VERNON to prox. RCA, VERNON to mid RCA, VERNON to OM1 EF 55% normal left ventricular systolic function	    At 2:30 pm the patient noticed that she was having difficulty with her vision, she noticed seeing double. A CODE STROKE was called at 2:56, NIHSS 1 for LUE dysmetria (old). CT head was negative for acute stroke or hemorrhage. After return from CT head patient was transferred to the CCU for post-cath observation, her symptoms improved, she continues to have occasional fleeting diplopia  Neurology consult noted       Plan:  -Continue current treatment  -Neuro checks x4 Q1hr   -D/W Dr. Rodriguez aware and agree with the plan  -Will continue to follow up

## 2017-06-27 LAB
ADD ON TEST-SPECIMEN IN LAB: SIGNIFICANT CHANGE UP
ANION GAP SERPL CALC-SCNC: 6 MMOL/L — SIGNIFICANT CHANGE UP (ref 5–17)
BASOPHILS # BLD AUTO: 0.1 K/UL — SIGNIFICANT CHANGE UP (ref 0–0.2)
BASOPHILS NFR BLD AUTO: 0.8 % — SIGNIFICANT CHANGE UP (ref 0–2)
BUN SERPL-MCNC: 13 MG/DL — SIGNIFICANT CHANGE UP (ref 7–23)
CALCIUM SERPL-MCNC: 10.4 MG/DL — HIGH (ref 8.5–10.1)
CHLORIDE SERPL-SCNC: 105 MMOL/L — SIGNIFICANT CHANGE UP (ref 96–108)
CHOLEST SERPL-MCNC: 122 MG/DL — SIGNIFICANT CHANGE UP (ref 10–199)
CO2 SERPL-SCNC: 28 MMOL/L — SIGNIFICANT CHANGE UP (ref 22–31)
CREAT SERPL-MCNC: 0.58 MG/DL — SIGNIFICANT CHANGE UP (ref 0.5–1.3)
EOSINOPHIL # BLD AUTO: 0.5 K/UL — SIGNIFICANT CHANGE UP (ref 0–0.5)
EOSINOPHIL NFR BLD AUTO: 5.1 % — SIGNIFICANT CHANGE UP (ref 0–6)
GLUCOSE SERPL-MCNC: 97 MG/DL — SIGNIFICANT CHANGE UP (ref 70–99)
HBA1C BLD-MCNC: 6.3 % — HIGH (ref 4–5.6)
HCT VFR BLD CALC: 40.5 % — SIGNIFICANT CHANGE UP (ref 34.5–45)
HDLC SERPL-MCNC: 57 MG/DL — SIGNIFICANT CHANGE UP (ref 40–125)
HGB BLD-MCNC: 13.7 G/DL — SIGNIFICANT CHANGE UP (ref 11.5–15.5)
INR BLD: 1.11 RATIO — SIGNIFICANT CHANGE UP (ref 0.88–1.16)
LIPID PNL WITH DIRECT LDL SERPL: 50 MG/DL — SIGNIFICANT CHANGE UP
LYMPHOCYTES # BLD AUTO: 1.7 K/UL — SIGNIFICANT CHANGE UP (ref 1–3.3)
LYMPHOCYTES # BLD AUTO: 19.5 % — SIGNIFICANT CHANGE UP (ref 13–44)
MCHC RBC-ENTMCNC: 30 PG — SIGNIFICANT CHANGE UP (ref 27–34)
MCHC RBC-ENTMCNC: 33.8 GM/DL — SIGNIFICANT CHANGE UP (ref 32–36)
MCV RBC AUTO: 88.6 FL — SIGNIFICANT CHANGE UP (ref 80–100)
MONOCYTES # BLD AUTO: 0.6 K/UL — SIGNIFICANT CHANGE UP (ref 0–0.9)
MONOCYTES NFR BLD AUTO: 7 % — SIGNIFICANT CHANGE UP (ref 2–14)
NEUTROPHILS # BLD AUTO: 6 K/UL — SIGNIFICANT CHANGE UP (ref 1.8–7.4)
NEUTROPHILS NFR BLD AUTO: 67.5 % — SIGNIFICANT CHANGE UP (ref 43–77)
PLATELET # BLD AUTO: 321 K/UL — SIGNIFICANT CHANGE UP (ref 150–400)
POTASSIUM SERPL-MCNC: 4.1 MMOL/L — SIGNIFICANT CHANGE UP (ref 3.5–5.3)
POTASSIUM SERPL-SCNC: 4.1 MMOL/L — SIGNIFICANT CHANGE UP (ref 3.5–5.3)
PROTHROM AB SERPL-ACNC: 12 SEC — SIGNIFICANT CHANGE UP (ref 9.8–12.7)
RBC # BLD: 4.57 M/UL — SIGNIFICANT CHANGE UP (ref 3.8–5.2)
RBC # FLD: 12.9 % — SIGNIFICANT CHANGE UP (ref 10.3–14.5)
SODIUM SERPL-SCNC: 139 MMOL/L — SIGNIFICANT CHANGE UP (ref 135–145)
TOTAL CHOLESTEROL/HDL RATIO MEASUREMENT: 2.1 RATIO — LOW (ref 3.3–7.1)
TRIGL SERPL-MCNC: 75 MG/DL — SIGNIFICANT CHANGE UP (ref 10–149)
WBC # BLD: 8.9 K/UL — SIGNIFICANT CHANGE UP (ref 3.8–10.5)
WBC # FLD AUTO: 8.9 K/UL — SIGNIFICANT CHANGE UP (ref 3.8–10.5)

## 2017-06-27 PROCEDURE — 93010 ELECTROCARDIOGRAM REPORT: CPT

## 2017-06-27 PROCEDURE — 99232 SBSQ HOSP IP/OBS MODERATE 35: CPT

## 2017-06-27 RX ORDER — LANOLIN ALCOHOL/MO/W.PET/CERES
3 CREAM (GRAM) TOPICAL AT BEDTIME
Qty: 0 | Refills: 0 | Status: DISCONTINUED | OUTPATIENT
Start: 2017-06-27 | End: 2017-06-30

## 2017-06-27 RX ORDER — WARFARIN SODIUM 2.5 MG/1
10 TABLET ORAL ONCE
Qty: 0 | Refills: 0 | Status: DISCONTINUED | OUTPATIENT
Start: 2017-06-27 | End: 2017-06-27

## 2017-06-27 RX ORDER — WARFARIN SODIUM 2.5 MG/1
5 TABLET ORAL ONCE
Qty: 0 | Refills: 0 | Status: COMPLETED | OUTPATIENT
Start: 2017-06-27 | End: 2017-06-27

## 2017-06-27 RX ORDER — LANOLIN ALCOHOL/MO/W.PET/CERES
3 CREAM (GRAM) TOPICAL AT BEDTIME
Qty: 0 | Refills: 0 | Status: DISCONTINUED | OUTPATIENT
Start: 2017-06-27 | End: 2017-06-27

## 2017-06-27 RX ADMIN — WARFARIN SODIUM 5 MILLIGRAM(S): 2.5 TABLET ORAL at 22:10

## 2017-06-27 RX ADMIN — Medication 3 MILLIGRAM(S): at 22:10

## 2017-06-27 RX ADMIN — SACUBITRIL AND VALSARTAN 1 TABLET(S): 24; 26 TABLET, FILM COATED ORAL at 06:43

## 2017-06-27 RX ADMIN — Medication 650 MILLIGRAM(S): at 07:16

## 2017-06-27 RX ADMIN — CLOPIDOGREL BISULFATE 75 MILLIGRAM(S): 75 TABLET, FILM COATED ORAL at 12:23

## 2017-06-27 RX ADMIN — Medication 20 MILLIGRAM(S): at 06:43

## 2017-06-27 RX ADMIN — Medication 175 MICROGRAM(S): at 05:19

## 2017-06-27 RX ADMIN — CARVEDILOL PHOSPHATE 6.25 MILLIGRAM(S): 80 CAPSULE, EXTENDED RELEASE ORAL at 06:43

## 2017-06-27 RX ADMIN — Medication 650 MILLIGRAM(S): at 08:47

## 2017-06-27 RX ADMIN — CARVEDILOL PHOSPHATE 6.25 MILLIGRAM(S): 80 CAPSULE, EXTENDED RELEASE ORAL at 22:10

## 2017-06-27 RX ADMIN — Medication 325 MILLIGRAM(S): at 12:22

## 2017-06-27 RX ADMIN — ATORVASTATIN CALCIUM 80 MILLIGRAM(S): 80 TABLET, FILM COATED ORAL at 22:10

## 2017-06-27 RX ADMIN — SACUBITRIL AND VALSARTAN 1 TABLET(S): 24; 26 TABLET, FILM COATED ORAL at 17:38

## 2017-06-27 RX ADMIN — Medication 650 MILLIGRAM(S): at 22:10

## 2017-06-27 NOTE — PROGRESS NOTE ADULT - SUBJECTIVE AND OBJECTIVE BOX
67 yo female with VERNON to RCA and OM1 yesterday. Versed and Fentanyl given during the PCI for sedation. Post procedure c/o double vision that is slowly improving. Gives previous h/o double vision when she used to dring heavily. She has had a previous stroke and has PAF, back on Coumadin. MRI is negative for acute stroke per prelim reading.      Allergies:   No Known Drug Allergies  Originally Entered as [see Comment: &quot;&quot;ALLERGIC TO DOG/CAT/HORSE HAIR;PLANTS: SNEEZING,SCRATCHY THROAT,STUFFY NOSE,ITCHY EYES] reaction to [Animals] (Unknown)      MEDICATIONS  (STANDING):  aspirin 325 milliGRAM(s) Oral daily  clopidogrel Tablet 75 milliGRAM(s) Oral daily  sacubitril 97 mG/valsartan 103 mG 1 Tablet(s) Oral two times a day  warfarin 5 milliGRAM(s) Oral daily  atorvastatin 80 milliGRAM(s) Oral at bedtime  carvedilol 6.25 milliGRAM(s) Oral every 12 hours  furosemide    Tablet 20 milliGRAM(s) Oral daily  levothyroxine 175 MICROGram(s) Oral daily    MEDICATIONS  (PRN):  ALBUTerol    90 MICROgram(s) HFA Inhaler 2 Puff(s) Inhalation every 6 hours PRN Shortness of Breath and/or Wheezing  acetaminophen   Tablet. 650 milliGRAM(s) Oral every 6 hours PRN Mild Pain (1 - 3)      ROS:     Detailed ten system ROS was performed and was negative except for history as eluded to above.    no fever  no chills  no nausea  no vomiting  no diarrhea  no constipation  no melena  no hematochezia  no chest pain  no palpitations  no sob  no dyspnea  no cough  no wheezing  no anorexia  no headache  no dizziness  no syncope  no weakness  no myalgia  no dysuria  no polyuria  no hematuria       Vital Signs Last 24 Hrs  T(C): 36.7 (27 Jun 2017 08:55), Max: 36.7 (27 Jun 2017 05:41)  T(F): 98.1 (27 Jun 2017 08:55), Max: 98.1 (27 Jun 2017 05:41)  HR: 79 (27 Jun 2017 09:00) (55 - 79)  BP: 145/51 (27 Jun 2017 08:55) (125/71 - 186/63)  BP(mean): 73 (27 Jun 2017 08:55) (67 - 108)  RR: 23 (27 Jun 2017 09:00) (14 - 29)  SpO2: 94% (27 Jun 2017 09:00) (7% - 98%)    I&O's Summary    26 Jun 2017 07:01  -  27 Jun 2017 07:00  --------------------------------------------------------  IN: 0 mL / OUT: 500 mL / NET: -500 mL    27 Jun 2017 07:01  -  27 Jun 2017 09:17  --------------------------------------------------------  IN: 0 mL / OUT: 200 mL / NET: -200 mL        PHYSICAL EXAM:    General Appearance:    Comfortable, AAO X 3, in no distress.   HEENT:                       Atraumatic, PERRLA, EOMI, conjunctiva clear.   Neck:                          Supple, no adenopathy, no thyromegaly, no JVD, no carotid bruit  Back:                          Symmetric, no CV angle fullness or tenderness, no soft tissue tenderness.  Chest:                         Clear to auscultation, no wheezes, rales or rhonchi, symmetric air entry, no tachypnea, retractions or cyanosis  Heart:                          S1, S2 normal, no gallop, no murmur.  Abdomen:                    Soft, non-tender, bowel sounds active. No palpable masses.   Extremities:                 no cyanosis, no edema, no joint swelling. Peripheral pulses normal. R groin is normal  Skin:                           Skin color, texture normal. No rashes   Neurologic:                  Grossly cranial nerves intact, sensory and motor normal.      INTERPRETATION OF TELEMETRY:  Normal sinus rhythm with no tachy or clara events. Rare PACs noted    ECG:  NSR, no ST T changes of ischemia or infarction.     LABS:                        13.7   8.9   )-----------( 321      ( 27 Jun 2017 04:53 )             40.5     06-27    139  |  105  |  13  ----------------------------<  97  4.1   |  28  |  0.58    Ca    10.4<H>      27 Jun 2017 04:53      PT/INR - ( 26 Jun 2017 08:30 )   PT: 11.6 sec;   INR: 1.07 ratio    PTT - ( 26 Jun 2017 08:30 )  PTT:33.3 sec      RADIOLOGY & ADDITIONAL STUDIES:    CT Head: focal encephalomalacia and gliosis in the BILATERAL corona radiata, and RIGHT frontal white matter, sequela of old infarctions.   No acute infarction is seen.

## 2017-06-27 NOTE — PATIENT PROFILE ADULT. - VISION (WITH CORRECTIVE LENSES IF THE PATIENT USUALLY WEARS THEM):
Partially impaired: cannot see medication labels or newsprint, but can see obstacles in path, and the surrounding layout; can count fingers at arm's length/reading glasses, has double vision

## 2017-06-27 NOTE — PHYSICAL THERAPY INITIAL EVALUATION ADULT - ACTIVE RANGE OF MOTION EXAMINATION, REHAB EVAL
except pt hesitant to flex R hip/knee after cath (yesterday) and L shld elev ~90 (/no Active ROM deficits were identified

## 2017-06-27 NOTE — PROGRESS NOTE ADULT - SUBJECTIVE AND OBJECTIVE BOX
Nurse Practitioner Progress note:   s/p [facility] with successful PCI and DESx3 ( mRCA, pRCA and OM1)  post procedure patient c/o double vision   code stroke called  head CT negative for acute stroke or hemorrhage.  transferred to CCU and observed overnight on tele where her symptoms improved    Patient feels well.  Denies chest pain, shortness of breath, dizziness or palpitations at this time.        EKG:NSR@  TELE:no ectopy  GENERAL: appears well, OOB to chair  CV: RRR, S1 S2, no murmur, rub, clicks or gallop noted  RESP: LCTA bilaterally, no wheeze, no rhonchi or crackles noted  GI/: soft, NT/ND  NEURO: AOx4, no focal deficits  EXTREMITIES: no edema, clubbing or cyanosis noted  PROCEDURE SITE:  Right groin dressing removed, site CDI with no bleeding, no hematoma, area soft, non tender, positive pedal pulses bilaterally  Right radial hemoband dressing removed, site CDI with no bleeding, no hematoma, patient able to move all digits with capillary refill < 3 seconds, fingers warm      T(C): 36.7, Max: 36.7 (06-27 @ 05:41)  HR: 64 (57 - 80)  BP: 134/56 (134/56 - 226/92)  RR: 28 (14 - 29)  SpO2: 92% (7% - 98%)  Wt(kg): --  CBC Full  -  ( 27 Jun 2017 04:53 )  WBC Count : 8.9 K/uL  Hemoglobin : 13.7 g/dL  Hematocrit : 40.5 %  Platelet Count - Automated : 321 K/uL  Mean Cell Volume : 88.6 fl  Mean Cell Hemoglobin : 30.0 pg  Mean Cell Hemoglobin Concentration : 33.8 gm/dL  Auto Neutrophil # : 6.0 K/uL  Auto Lymphocyte # : 1.7 K/uL  Auto Monocyte # : 0.6 K/uL  Auto Eosinophil # : 0.5 K/uL  Auto Basophil # : 0.1 K/uL  Auto Neutrophil % : 67.5 %  Auto Lymphocyte % : 19.5 %  Auto Monocyte % : 7.0 %  Auto Eosinophil % : 5.1 %  Auto Basophil % : 0.8 %    06-27    139  |  105  |  13  ----------------------------<  97  4.1   |  28  |  0.58    Ca    10.4<H>      27 Jun 2017 04:53              MEDICATIONS  (STANDING):  aspirin 325milliGRAM(s) Oral daily  clopidogrel Tablet 75milliGRAM(s) Oral daily  sacubitril 97 mG/valsartan 103 mG 1Tablet(s) Oral two times a day  warfarin 5milliGRAM(s) Oral daily  atorvastatin 80milliGRAM(s) Oral at bedtime  carvedilol 6.25milliGRAM(s) Oral every 12 hours  furosemide    Tablet 20milliGRAM(s) Oral daily  levothyroxine 175MICROGram(s) Oral daily    MEDICATIONS  (PRN):  ALBUTerol    90 MICROgram(s) HFA Inhaler 2Puff(s) Inhalation every 6 hours PRN Shortness of Breath and/or Wheezing  acetaminophen   Tablet. 650milliGRAM(s) Oral every 6 hours PRN Mild Pain (1 - 3)        HPI:  66yr old female PMH HTN, systolic & diastolic CHF, pulmonary hypertension, chronic hypercarbic respiratory failure, CVA with left sided weakness, echo 12/16 revealed EF of 30-35%, MORGAN demonstrated mobile atheroma in the aortic arch moderate to severe mitral regurgitation and moderate to severe tricuspid regurgitation. Repeat echo 4/2017 revealed improved EF and improvement of tricuspid regurgitation. Pt. had c/o dyspnea with moderate exertion, she underwent a nuclear stress test which revealed moderate to large sized moderately severe inferior lateral wall ischemia now s/p LHC with succesful PCI and DESx3   ( mRCA, pRCA and OM1)    ASSESSMENT/PLAN:    Coronary artery disease    -OOB to chair, then ambulate ad enrique on tele  -continue aspirin, plavix, BB, statin, diuretic and coumadin  -Encourage PO fluids  -Plan of care discussed with patient, family and MD Rodriguez  -cardiologist and hospitalist to follow  -labs, EKG and procedure site assessed  -Follow-up with attending MD  within 1 week  -Discussed therapeutic lifestyle changes to reduce risk factors such as following a cardiac diet, weight loss, maintaining a healthy weight, exercise, smoking cessation, medication compliance, and regular follow-up  with MD to know your numbers (BP, cholesterol, weight, and glucose) Nurse Practitioner Progress note:   s/p [facility] with successful PCI and DESx3 ( mRCA, pRCA and OM1)  post procedure patient c/o double vision   code stroke called  head CT negative for acute stroke or hemorrhage.  transferred to CCU and observed overnight on tele where her symptoms improved  MRI/MRA results pending    Patient feels well. Reports occasional double vision that is improved from yesterday.  Denies chest pain, shortness of breath, dizziness or palpitations at this time.        EKG:NSR@62  TELE:occ PVCs  GENERAL: appears well, OOB to chair  CV: RRR, S1 S2, no murmur, rub, clicks or gallop noted  RESP: LCTA bilaterally, no wheeze, no rhonchi or crackles noted  GI/: soft, NT/ND  NEURO: AOx4, reports occasional double vision  EXTREMITIES: no edema, clubbing or cyanosis noted  PROCEDURE SITE:  Right groin dressing removed, site CDI with no bleeding, no hematoma, area soft, non tender, positive pedal pulses bilaterally        T(C): 36.7, Max: 36.7 (06-27 @ 05:41)  HR: 64 (57 - 80)  BP: 134/56 (134/56 - 226/92)  RR: 28 (14 - 29)  SpO2: 92% (7% - 98%)  Wt(kg): --  CBC Full  -  ( 27 Jun 2017 04:53 )  WBC Count : 8.9 K/uL  Hemoglobin : 13.7 g/dL  Hematocrit : 40.5 %  Platelet Count - Automated : 321 K/uL  Mean Cell Volume : 88.6 fl  Mean Cell Hemoglobin : 30.0 pg  Mean Cell Hemoglobin Concentration : 33.8 gm/dL  Auto Neutrophil # : 6.0 K/uL  Auto Lymphocyte # : 1.7 K/uL  Auto Monocyte # : 0.6 K/uL  Auto Eosinophil # : 0.5 K/uL  Auto Basophil # : 0.1 K/uL  Auto Neutrophil % : 67.5 %  Auto Lymphocyte % : 19.5 %  Auto Monocyte % : 7.0 %  Auto Eosinophil % : 5.1 %  Auto Basophil % : 0.8 %    06-27    139  |  105  |  13  ----------------------------<  97  4.1   |  28  |  0.58    Ca    10.4<H>      27 Jun 2017 04:53              MEDICATIONS  (STANDING):  aspirin 325milliGRAM(s) Oral daily  clopidogrel Tablet 75milliGRAM(s) Oral daily  sacubitril 97 mG/valsartan 103 mG 1Tablet(s) Oral two times a day  warfarin 5milliGRAM(s) Oral daily  atorvastatin 80milliGRAM(s) Oral at bedtime  carvedilol 6.25milliGRAM(s) Oral every 12 hours  furosemide    Tablet 20milliGRAM(s) Oral daily  levothyroxine 175MICROGram(s) Oral daily    MEDICATIONS  (PRN):  ALBUTerol    90 MICROgram(s) HFA Inhaler 2Puff(s) Inhalation every 6 hours PRN Shortness of Breath and/or Wheezing  acetaminophen   Tablet. 650milliGRAM(s) Oral every 6 hours PRN Mild Pain (1 - 3)        HPI:  66yr old female PMH HTN, systolic & diastolic CHF, pulmonary hypertension, chronic hypercarbic respiratory failure, CVA with left sided weakness, echo 12/16 revealed EF of 30-35%, MORGAN demonstrated mobile atheroma in the aortic arch moderate to severe mitral regurgitation and moderate to severe tricuspid regurgitation. Repeat echo 4/2017 revealed improved EF and improvement of tricuspid regurgitation. Pt. had c/o dyspnea with moderate exertion, she underwent a nuclear stress test which revealed moderate to large sized moderately severe inferior lateral wall ischemia now s/p LHC with succesful PCI and DESx3   ( mRCA, pRCA and OM1)  code stroke, CT head negative and MRI/MRA pending  ASSESSMENT/PLAN:    Coronary artery disease    -OOB to chair, then ambulate ad enrique on tele  -continue aspirin, plavix, BB, statin, diuretic and coumadin  -Encourage PO fluids  -Plan of care discussed with patient, family and MD Rodriguez  -Neuro to follow, awaiting results of MRI/MRA  -cardiology to follow  -labs, EKG and procedure site assessed  -Follow-up with attending MD  within 1 week  -Discussed therapeutic lifestyle changes to reduce risk factors such as following a cardiac diet, weight loss, maintaining a healthy weight, exercise, smoking cessation, medication compliance, and regular follow-up  with MD to know your numbers (BP, cholesterol, weight, and glucose)

## 2017-06-27 NOTE — PROGRESS NOTE ADULT - ASSESSMENT
s/p PCI of RCA, OM1  Double vision - probably related to fentanyl  HTN  PAF    Suggest:    Continue current treatment  Discharge planning if cleared by neuro  Double dose coumadin today, then revert back to regular dosing. Once INR is > 2 stop aspirin and continue plavix 75 mg daily  D/w daughter over the phone yesterday

## 2017-06-27 NOTE — CONSULT NOTE ADULT - SUBJECTIVE AND OBJECTIVE BOX
PCP- [facility] [place]    CC- "I see blurry"    HPI:  66yr old female PMH HTN, systolic & diastolic CHF, pulmonary hypertension, chronic hypercarbic respiratory failure, CVA with left sided weakness, echo  revealed EF of 30-35%, MORGAN demonstrated mobile atheroma in the aortic arch moderate to severe mitral regurgitation and moderate to severe tricuspid regurgitation. Repeat echo 2017 revealed improved EF and improvement of tricuspid regurgitation. Pt. had c/o dyspnea with moderate exertion, she underwent a nuclear stress test which revealed moderate to large sized moderately severe inferior lateral wall ischemia. Pt presented to  on 17 for elective card cath. Code "stroke" called in for blurry vision. Patient was seen by neurologist, no TPA given and patient was transferred to CCU. Hospitalist team was contacted today to place patient on hospitalist service for further management. Patient had been followed by cardiologist and neurologist since yesterday    17- pt states she still "see blurry, but feel good", wants to go home as she "misses her dog". Denies other complaints, no chest pain, no SOB    Review of system- All 10 systems reviewed and is as per HPI otherwise negative.     T(C): 36.7 (17 @ 08:55), Max: 36.7 (17 @ 05:41)  HR: 66 (17 @ 15:00) (55 - 91)  BP: 139/65 (17 @ 15:00) (125/71 - 184/71)  RR: 30 (17 @ 15:00) (14 - 31)  SpO2: 94% (17 @ 15:00) (84% - 96%)  Wt(kg): --    LABS:                        13.7   8.9   )-----------( 321      ( 2017 04:53 )             40.5         139  |  105  |  13  ----------------------------<  97  4.1   |  28  |  0.58    Ca    10.4<H>      2017 04:53  PT/INR - ( 2017 08:30 )   PT: 11.6 sec;   INR: 1.07 ratio     PTT - ( 2017 08:30 )  PTT:33.3 sec    RADIOLOGY & ADDITIONAL TESTS:  EXAM:  BRAIN (MRI) W O CON                        PROCEDURE DATE:  2017    INTERPRETATION:    MR brain  without gadolinium     CLINICAL INFORMATION:   Double vision in both eyes for one day, Stroke.    TECHNIQUE:   Sagittal and axial T1-weighted images, axial FLAIR images,   axial gradient echo and T2-weighted images and axial diffusion weighted   images of the brain were obtained.         FINDINGS:   CT dated 2017 available for review.         The brain demonstrates a tiny acute lacunar infarction within the   inferior LEFT cerebellum. There are multiple old lacunar infarctions in   the BILATERAL corona radiata and deep RIGHT white matter and BILATERAL   cerebellum. Scattered periventricular and bifrontal subcortical white   matter ischemia is noted.   No acute cerebral cortical infarct is found.     No intracranial hemorrhage is recognized. No mass effect is found in the   brain.      The ventricles, sulci and basal cisterns appear unremarkable.  The vertebral and internal carotid arteries demonstrate expected flow   voids indicating their patency.       The orbits are unremarkable.  The paranasal sinuses are clear.  The nasal   cavity appears intact.  The nasopharynx is symmetric.  The central skull   base and temporal bones are intact.  The calvarium appears unremarkable.    IMPRESSION:   Tiny acute lacunar infarction within the inferior LEFT   cerebellum. Multiple old lacunar infarctions in the BILATERAL corona   radiata and deep RIGHT white matter and BILATERAL cerebellum. Scattered   periventricular and bifrontal subcortical white matter ischemia is noted.     EXAM:  CAROTID NECK(MRA)W O CON                        PROCEDURE DATE:  2017      INTERPRETATION:  MR angiography of the carotid circulation.  (Non   gadolinium technique.)      CLINICAL INFORMATION:      TIA, Stroke.          TECHNIQUE:  Two dimensional time of flight MR angiography of the carotid   circulation was performed.  This data set was reconstructed as maximum   intensity pixel images displayed in multiple rotations.  An additional   three dimensional time of flight acquisition was obtained with anatomic   coverage limited to the carotid bulbs.       The magnitude of stenosis   was evaluated based on the diameter of an intact distal of the internal   carotid artery using NASCET criteria.    FINDINGS:   No prior similar studies are available for review.  The right carotid system demonstrates an intact common carotid artery.    The carotid bulb is intact without hemodynamically significant stenosis.    The internal carotid artery shows a 1 cm defect at its proximal segment   which may reflect a critical stenosis (greater than 99%) although images   are degraded by patient motion. The origin and initial segment of the   right external carotid artery also appears intact.  The left carotid system demonstrates an intact common carotid artery.    The carotid bulb is intact without hemodynamically significant stenosis.    The internal carotid artery is intact to the level the skull base.  The   origin and initial segment of the left external carotid artery also   appears intact.  The vertebral arteries demonstrate antegrade flow.  The degree of   vertebral asymmetry is within physiologic variation.    IMPRESSION:  Images are degraded by patient motion.    1.  Right carotid system:   1 cm defect at its proximal segment which may   reflect a critical stenosis (greater than 99%) although images are   degraded by patient motion.    CTA may be utilized for confirmation of   this finding.  2.  Left carotid system:   No hemodynamically significant stenosis.     EXAM:  HEAD (MRA)W O CON                        PROCEDURE DATE:  2017      INTERPRETATION:      MR angiography of the intracranial circulation.  (Non gadolinium   technique.    CLINICAL INFORMATION:  stroke; vascular stenosis.    TECHNIQUE:  MR angiography of intracranial circulation was performed   using three-dimensional time of flight technique. Post processing   angiographic reconstruction of images was performed. This data set was   reconstructed as maximum intensity pixel images and displayed in multiple   rotations.    FINDINGS:   No comparison similar studies are available.  The anterior circulation demonstrates intact petrous, cavernous and   clinoid segments of the internal carotid arteries.  The anterior cerebral  arteries are intact with a focal stenosis at the proximal LEFT A1   segment. An anterior communicating artery is demonstrated.  The middle   cerebral arteries demonstrate intact initial M1 and M2 segments. There   are symmetric intact peripheral Sylvian branches.  The posterior circulation demonstrates intact vertebral and basilar   arteries.  There is a fetal origin to the RIGHT posterior cerebral artery   with distal attenuation.  No abnormal vessel termination, vascular malformation or intracranial   aneurysm is recognized.    IMPRESSION:   Focal stenosis at the proximal LEFT A1 segment.     Fetal   origin to the RIGHT posterior cerebral artery with distal attenuation.    PHYSICAL EXAM:  GENERAL: NAD, well-groomed, well-developed  HEAD:  Atraumatic, Normocephalic  EYES: EOMI, PERRLA ,no visual field drops as per my assessment  HEENT: Moist mucous membranes  NECK: Supple, No JVD  NERVOUS SYSTEM:  Alert & Oriented X3, gait not tested  CHEST/LUNG: Clear to auscultation bilaterally; No rales, rhonchi, wheezing, or rubs  HEART: Regular rate and rhythm; No murmurs, rubs, or gallops  ABDOMEN: Soft, Nontender, Nondistended; Bowel sounds present  GENITOURINARY- Voiding, no palpable bladder  EXTREMITIES:  2+ Peripheral Pulses, No clubbing, cyanosis, or edema  MUSCULOSKELTAL- No muscle tenderness, Muscle tone normal, No joint tenderness, no Joint swelling, Joint range of motion-normal  SKIN-no rash, no lesion         Daily Weight in k.9 (2017 05:41)      aspirin 325 milliGRAM(s) Oral daily  clopidogrel Tablet 75 milliGRAM(s) Oral daily  sacubitril 97 mG/valsartan 103 mG 1 Tablet(s) Oral two times a day  atorvastatin 80 milliGRAM(s) Oral at bedtime  carvedilol 6.25 milliGRAM(s) Oral every 12 hours  ALBUTerol    90 MICROgram(s) HFA Inhaler 2 Puff(s) Inhalation every 6 hours PRN  furosemide    Tablet 20 milliGRAM(s) Oral daily  levothyroxine 175 MICROGram(s) Oral daily  acetaminophen   Tablet. 650 milliGRAM(s) Oral every 6 hours PRN  warfarin 10 milliGRAM(s) Oral once      Assessment/Plan  #Acute cerebellar CVA/h/o old CVA with LT-sided residual weakness  No TPA given current admission (see details in neuro note)  Aspirin, Coumadin- no bridging for high risk of hemorrhagic conversion  Imaging studies reviewed  Swallow eval  PT eval  ECHO  Cardio and neuro f/u    # PCP- [facility] – [place]    CC- "I see blurry"    HPI:  66yr old female PMH HTN, systolic & diastolic CHF, pulmonary hypertension, chronic hypercarbic respiratory failure, CVA with left sided weakness, echo  revealed EF of 30-35%, MORGAN demonstrated mobile atheroma in the aortic arch moderate to severe mitral regurgitation and moderate to severe tricuspid regurgitation. Repeat echo 2017 revealed improved EF and improvement of tricuspid regurgitation. Pt. had c/o dyspnea with moderate exertion, she underwent a nuclear stress test which revealed moderate to large sized moderately severe inferior lateral wall ischemia. Pt presented to  on 17 for elective card cath. Code "stroke" called in for blurry vision. Patient was seen by neurologist, no TPA given and patient was transferred to CCU. Hospitalist team was contacted today to place patient on hospitalist service for further management. Patient had been followed by cardiologist and neurologist since yesterday    17- pt states she still "see blurry, but feel good", wants to go home as she "misses her dog". Denies other complaints, no chest pain, no SOB    Review of system- All 10 systems reviewed and is as per HPI otherwise negative.     T(C): 36.7 (17 @ 08:55), Max: 36.7 (17 @ 05:41)  HR: 66 (17 @ 15:00) (55 - 91)  BP: 139/65 (17 @ 15:00) (125/71 - 184/71)  RR: 30 (17 @ 15:00) (14 - 31)  SpO2: 94% (17 @ 15:00) (84% - 96%)  Wt(kg): --    LABS:                        13.7   8.9   )-----------( 321      ( 2017 04:53 )             40.5         139  |  105  |  13  ----------------------------<  97  4.1   |  28  |  0.58    Ca    10.4<H>      2017 04:53  PT/INR - ( 2017 08:30 )   PT: 11.6 sec;   INR: 1.07 ratio     PTT - ( 2017 08:30 )  PTT:33.3 sec    RADIOLOGY & ADDITIONAL TESTS:  EXAM:  BRAIN (MRI) W O CON                        PROCEDURE DATE:  2017    INTERPRETATION:    MR brain  without gadolinium     CLINICAL INFORMATION:   Double vision in both eyes for one day, Stroke.    TECHNIQUE:   Sagittal and axial T1-weighted images, axial FLAIR images,   axial gradient echo and T2-weighted images and axial diffusion weighted   images of the brain were obtained.         FINDINGS:   CT dated 2017 available for review.         The brain demonstrates a tiny acute lacunar infarction within the   inferior LEFT cerebellum. There are multiple old lacunar infarctions in   the BILATERAL corona radiata and deep RIGHT white matter and BILATERAL   cerebellum. Scattered periventricular and bifrontal subcortical white   matter ischemia is noted.   No acute cerebral cortical infarct is found.     No intracranial hemorrhage is recognized. No mass effect is found in the   brain.      The ventricles, sulci and basal cisterns appear unremarkable.  The vertebral and internal carotid arteries demonstrate expected flow   voids indicating their patency.       The orbits are unremarkable.  The paranasal sinuses are clear.  The nasal   cavity appears intact.  The nasopharynx is symmetric.  The central skull   base and temporal bones are intact.  The calvarium appears unremarkable.    IMPRESSION:   Tiny acute lacunar infarction within the inferior LEFT   cerebellum. Multiple old lacunar infarctions in the BILATERAL corona   radiata and deep RIGHT white matter and BILATERAL cerebellum. Scattered   periventricular and bifrontal subcortical white matter ischemia is noted.     EXAM:  CAROTID NECK(MRA)W O CON                        PROCEDURE DATE:  2017      INTERPRETATION:  MR angiography of the carotid circulation.  (Non   gadolinium technique.)      CLINICAL INFORMATION:      TIA, Stroke.          TECHNIQUE:  Two dimensional time of flight MR angiography of the carotid   circulation was performed.  This data set was reconstructed as maximum   intensity pixel images displayed in multiple rotations.  An additional   three dimensional time of flight acquisition was obtained with anatomic   coverage limited to the carotid bulbs.       The magnitude of stenosis   was evaluated based on the diameter of an intact distal of the internal   carotid artery using NASCET criteria.    FINDINGS:   No prior similar studies are available for review.  The right carotid system demonstrates an intact common carotid artery.    The carotid bulb is intact without hemodynamically significant stenosis.    The internal carotid artery shows a 1 cm defect at its proximal segment   which may reflect a critical stenosis (greater than 99%) although images   are degraded by patient motion. The origin and initial segment of the   right external carotid artery also appears intact.  The left carotid system demonstrates an intact common carotid artery.    The carotid bulb is intact without hemodynamically significant stenosis.    The internal carotid artery is intact to the level the skull base.  The   origin and initial segment of the left external carotid artery also   appears intact.  The vertebral arteries demonstrate antegrade flow.  The degree of   vertebral asymmetry is within physiologic variation.    IMPRESSION:  Images are degraded by patient motion.    1.  Right carotid system:   1 cm defect at its proximal segment which may   reflect a critical stenosis (greater than 99%) although images are   degraded by patient motion.    CTA may be utilized for confirmation of   this finding.  2.  Left carotid system:   No hemodynamically significant stenosis.     EXAM:  HEAD (MRA)W O CON                        PROCEDURE DATE:  2017      INTERPRETATION:      MR angiography of the intracranial circulation.  (Non gadolinium   technique.    CLINICAL INFORMATION:  stroke; vascular stenosis.    TECHNIQUE:  MR angiography of intracranial circulation was performed   using three-dimensional time of flight technique. Post processing   angiographic reconstruction of images was performed. This data set was   reconstructed as maximum intensity pixel images and displayed in multiple   rotations.    FINDINGS:   No comparison similar studies are available.  The anterior circulation demonstrates intact petrous, cavernous and   clinoid segments of the internal carotid arteries.  The anterior cerebral  arteries are intact with a focal stenosis at the proximal LEFT A1   segment. An anterior communicating artery is demonstrated.  The middle   cerebral arteries demonstrate intact initial M1 and M2 segments. There   are symmetric intact peripheral Sylvian branches.  The posterior circulation demonstrates intact vertebral and basilar   arteries.  There is a fetal origin to the RIGHT posterior cerebral artery   with distal attenuation.  No abnormal vessel termination, vascular malformation or intracranial   aneurysm is recognized.    IMPRESSION:   Focal stenosis at the proximal LEFT A1 segment.     Fetal   origin to the RIGHT posterior cerebral artery with distal attenuation.    PHYSICAL EXAM:  GENERAL: NAD, well-groomed, well-developed  HEAD:  Atraumatic, Normocephalic  EYES: EOMI, PERRLA ,no visual field drops as per my assessment  HEENT: Moist mucous membranes  NECK: Supple, No JVD  NERVOUS SYSTEM:  Alert & Oriented X3, gait not tested  CHEST/LUNG: Clear to auscultation bilaterally; No rales, rhonchi, wheezing, or rubs  HEART: Regular rate and rhythm; No murmurs, rubs, or gallops  ABDOMEN: Soft, Nontender, Nondistended; Bowel sounds present  GENITOURINARY- Voiding, no palpable bladder  EXTREMITIES:  2+ Peripheral Pulses, No clubbing, cyanosis, or edema  MUSCULOSKELTAL- No muscle tenderness, Muscle tone normal, No joint tenderness, no Joint swelling, Joint range of motion-normal  SKIN-no rash, no lesion         Daily Weight in k.9 (2017 05:41)      aspirin 325 milliGRAM(s) Oral daily  clopidogrel Tablet 75 milliGRAM(s) Oral daily  sacubitril 97 mG/valsartan 103 mG 1 Tablet(s) Oral two times a day  atorvastatin 80 milliGRAM(s) Oral at bedtime  carvedilol 6.25 milliGRAM(s) Oral every 12 hours  ALBUTerol    90 MICROgram(s) HFA Inhaler 2 Puff(s) Inhalation every 6 hours PRN  furosemide    Tablet 20 milliGRAM(s) Oral daily  levothyroxine 175 MICROGram(s) Oral daily  acetaminophen   Tablet. 650 milliGRAM(s) Oral every 6 hours PRN  warfarin 10 milliGRAM(s) Oral once      Assessment/Plan  #Acute cerebellar CVA/h/o old CVA with LT-sided residual weakness  No TPA given current admission (see details in neuro note)  Aspirin, Coumadin- no bridging for high risk of hemorrhagic conversion  Imaging studies reviewed  Swallow eval  PT eval  ECHO  Cardio and neuro f/u    #CAD s/p stent LCX  Cardio f/u    #Chr syst/diast CHF  Compensated    #Dispo- hospitalist team will follow with patient

## 2017-06-27 NOTE — PHYSICAL THERAPY INITIAL EVALUATION ADULT - GENERAL OBSERVATIONS, REHAB EVAL
pt rec'd supine in bed in CCU, monitors in place, NAD, pleasant/cooperative, talkative, reports intermittent diploplia

## 2017-06-27 NOTE — PROGRESS NOTE ADULT - SUBJECTIVE AND OBJECTIVE BOX
S&O:   Pt has intermittent diplopia looking at distant objects, resolves when closing one eye; has no speech disturbance, gait is mildly unstable (was able to get OOB).    MRI brain reveals small acute cerebellar infarct, MRA no VB stenosis     ROS: As above, other ROS NEGATIVE    MEDICATIONS  (STANDING):  aspirin 325 milliGRAM(s) Oral daily  clopidogrel Tablet 75 milliGRAM(s) Oral daily  sacubitril 97 mG/valsartan 103 mG 1 Tablet(s) Oral two times a day  warfarin 5 milliGRAM(s) Oral daily  atorvastatin 80 milliGRAM(s) Oral at bedtime  carvedilol 6.25 milliGRAM(s) Oral every 12 hours  furosemide    Tablet 20 milliGRAM(s) Oral daily  levothyroxine 175 MICROGram(s) Oral daily      Vital Signs Last 24 Hrs  T(C): 36.7 (27 Jun 2017 08:55), Max: 36.7 (27 Jun 2017 05:41)  T(F): 98.1 (27 Jun 2017 08:55), Max: 98.1 (27 Jun 2017 05:41)  HR: 79 (27 Jun 2017 09:00) (55 - 79)  BP: 145/51 (27 Jun 2017 08:55) (125/71 - 186/63)  BP(mean): 73 (27 Jun 2017 08:55) (67 - 108)  RR: 23 (27 Jun 2017 09:00) (14 - 29)  SpO2: 94% (27 Jun 2017 09:00) (7% - 98%)    Neurological exam:  HF: A x O x 3, very anxious. Speech fluent, borderline dysarthria,No Aphasia. Naming /repetition intact   CN: PEARRL, EOMI, vertical nystagmus, diplopia vertical for distance, facial sensation normal, no NLFD, tongue midline, Palate moves equally, SCM equal bilaterally  Motor: No pronator drift, Strength 5/5 in all 4 ext, normal bulk and tone, no tremor    Sens: Intact to light touch / PP/ VS/ JS    Reflexes: Symmetric and normal toes b/l  Coord: dysmetria with finger to nose  LUE  Gait/Balance: Not tested   NIHSS: 2                        13.7   8.9   )-----------( 321      ( 27 Jun 2017 04:53 )             40.5     06-27    139  |  105  |  13  ----------------------------<  97  4.1   |  28  |  0.58    Ca    10.4<H>      27 Jun 2017 04:53      Radiology report:  - MRI brain: < from: MRI Head w/o Cont (06.26.17 @ 22:57) >  Tiny acute lacunar infarction within the inferior LEFT   cerebellum. Multiple old lacunar infarctions in the BILATERAL corona   radiata and deep RIGHT white matter and BILATERAL cerebellum. Scattered   periventricular and bifrontal subcortical white matter ischemia is noted.     - MRA brain/carotids:   Focal stenosis at the proximal LEFT A1 segment.     Fetal   origin to the RIGHT posterior cerebral artery with distal attenuation.    Right carotid system: 1 cm defect at its proximal segment which may   reflect a critical stenosis (greater than 99%) although images are   degraded by patient motion. CTA may be utilized for confirmation of   this finding.    Left carotid system:  No hemodynamically significant stenosis.

## 2017-06-27 NOTE — PHYSICAL THERAPY INITIAL EVALUATION ADULT - PERTINENT HX OF CURRENT PROBLEM, REHAB EVAL
dyspnea with moderate exertion. S/P cardiac cath / three stents on 6/26/17. Pt c/o double vision, post cath - code stroke called; noted to have dysmetria her left arm (old from prior stroke in December) and visual disturbance; NIHSS - 1. CT head negative for acute stroke/ hemorrhage. MRI brain reveals small acute cerebellar infarct

## 2017-06-27 NOTE — PROGRESS NOTE ADULT - ASSESSMENT
66yr old female with a PMH of HTN, systolic & diastolic CHF EF 30-35%, pulmonary hypertension, chronic hypercarbic respiratory failure, CVA with left sided weakness, A-Fib (pt was on coumadin which was held for today's procedure), mobile atheroma in the AOA, severe tricuspid regurgitation; S/P cardiac cath / three stents on 6/26/17. Pt c/o double vision, post cath - code stroke called; noted to have dysmetria her left arm (old from prior stroke in December) and visual disturbance; NIHSS - 1. CT head negative for acute stroke/ hemorrhage.    MRI brain reveals acute small left cerebellar infarct, MRA carotids possible right proximal ICA critical stenosis    # Acute cerebellar infarct ; vertical diplopia with LUE dysmetria; TPA not given as the sx were minimal and improving.    # ? Severe JODIE proximal stenosis VS motion artefact       - Coumadin with Aspirin till INR > 2.0  - atorvastatin 40 MG daily   - PT eval  - Speech therapy  - DVT prophylaxis  - Carotid dopplers    Management d/w pt and Dr. Rodriguez

## 2017-06-27 NOTE — PROGRESS NOTE ADULT - SUBJECTIVE AND OBJECTIVE BOX
D/W Dr Daugherty. Pt's MRI is reviewed with radiology. Now there is a suggestion of acute cerebellar infarct. Continue anticoagulation as planned earlier. Continue treatment of HTN, HLD as planned. Get Physical therapy evaluation. Will continue to monitor her in patient today. Symptoms are resolving but she has nystagmus.

## 2017-06-28 LAB
CHOLEST SERPL-MCNC: 109 MG/DL — SIGNIFICANT CHANGE UP (ref 10–199)
HCT VFR BLD CALC: 40.8 % — SIGNIFICANT CHANGE UP (ref 34.5–45)
HDLC SERPL-MCNC: 54 MG/DL — SIGNIFICANT CHANGE UP (ref 40–125)
HGB BLD-MCNC: 13.3 G/DL — SIGNIFICANT CHANGE UP (ref 11.5–15.5)
INR BLD: 1.15 RATIO — SIGNIFICANT CHANGE UP (ref 0.88–1.16)
LIPID PNL WITH DIRECT LDL SERPL: 39 MG/DL — SIGNIFICANT CHANGE UP
MCHC RBC-ENTMCNC: 29.2 PG — SIGNIFICANT CHANGE UP (ref 27–34)
MCHC RBC-ENTMCNC: 32.6 GM/DL — SIGNIFICANT CHANGE UP (ref 32–36)
MCV RBC AUTO: 89.6 FL — SIGNIFICANT CHANGE UP (ref 80–100)
PLATELET # BLD AUTO: 350 K/UL — SIGNIFICANT CHANGE UP (ref 150–400)
PROTHROM AB SERPL-ACNC: 12.5 SEC — SIGNIFICANT CHANGE UP (ref 9.8–12.7)
RBC # BLD: 4.55 M/UL — SIGNIFICANT CHANGE UP (ref 3.8–5.2)
RBC # FLD: 13.3 % — SIGNIFICANT CHANGE UP (ref 10.3–14.5)
TOTAL CHOLESTEROL/HDL RATIO MEASUREMENT: 2 RATIO — LOW (ref 3.3–7.1)
TRIGL SERPL-MCNC: 81 MG/DL — SIGNIFICANT CHANGE UP (ref 10–149)
WBC # BLD: 10.6 K/UL — HIGH (ref 3.8–10.5)
WBC # FLD AUTO: 10.6 K/UL — HIGH (ref 3.8–10.5)

## 2017-06-28 PROCEDURE — 99232 SBSQ HOSP IP/OBS MODERATE 35: CPT

## 2017-06-28 PROCEDURE — 93880 EXTRACRANIAL BILAT STUDY: CPT | Mod: 26

## 2017-06-28 PROCEDURE — 93306 TTE W/DOPPLER COMPLETE: CPT | Mod: 26

## 2017-06-28 RX ORDER — ENOXAPARIN SODIUM 100 MG/ML
90 INJECTION SUBCUTANEOUS EVERY 12 HOURS
Qty: 0 | Refills: 0 | Status: DISCONTINUED | OUTPATIENT
Start: 2017-06-28 | End: 2017-06-30

## 2017-06-28 RX ORDER — WARFARIN SODIUM 2.5 MG/1
5 TABLET ORAL ONCE
Qty: 0 | Refills: 0 | Status: COMPLETED | OUTPATIENT
Start: 2017-06-28 | End: 2017-06-28

## 2017-06-28 RX ADMIN — CARVEDILOL PHOSPHATE 6.25 MILLIGRAM(S): 80 CAPSULE, EXTENDED RELEASE ORAL at 10:36

## 2017-06-28 RX ADMIN — CARVEDILOL PHOSPHATE 6.25 MILLIGRAM(S): 80 CAPSULE, EXTENDED RELEASE ORAL at 17:53

## 2017-06-28 RX ADMIN — Medication 20 MILLIGRAM(S): at 06:49

## 2017-06-28 RX ADMIN — SACUBITRIL AND VALSARTAN 1 TABLET(S): 24; 26 TABLET, FILM COATED ORAL at 06:50

## 2017-06-28 RX ADMIN — Medication 325 MILLIGRAM(S): at 12:14

## 2017-06-28 RX ADMIN — WARFARIN SODIUM 5 MILLIGRAM(S): 2.5 TABLET ORAL at 21:29

## 2017-06-28 RX ADMIN — Medication 650 MILLIGRAM(S): at 14:53

## 2017-06-28 RX ADMIN — Medication 650 MILLIGRAM(S): at 21:29

## 2017-06-28 RX ADMIN — Medication 3 MILLIGRAM(S): at 23:38

## 2017-06-28 RX ADMIN — ATORVASTATIN CALCIUM 80 MILLIGRAM(S): 80 TABLET, FILM COATED ORAL at 21:29

## 2017-06-28 RX ADMIN — Medication 650 MILLIGRAM(S): at 16:47

## 2017-06-28 RX ADMIN — CLOPIDOGREL BISULFATE 75 MILLIGRAM(S): 75 TABLET, FILM COATED ORAL at 12:14

## 2017-06-28 RX ADMIN — ENOXAPARIN SODIUM 90 MILLIGRAM(S): 100 INJECTION SUBCUTANEOUS at 17:53

## 2017-06-28 RX ADMIN — Medication 650 MILLIGRAM(S): at 00:00

## 2017-06-28 RX ADMIN — Medication 175 MICROGRAM(S): at 05:41

## 2017-06-28 RX ADMIN — SACUBITRIL AND VALSARTAN 1 TABLET(S): 24; 26 TABLET, FILM COATED ORAL at 17:53

## 2017-06-28 NOTE — PROGRESS NOTE ADULT - ASSESSMENT
66yr old female with a PMH of HTN, systolic & diastolic CHF EF 30-35%, pulmonary hypertension, chronic hypercarbic respiratory failure, CVA with left sided weakness, A-Fib (pt was on coumadin which was held for cath- procedure), mobile atheroma in the AOA, severe tricuspid regurgitation; S/P cardiac cath / three stents on 6/26/17. Pt c/o double vision, post cath - code stroke called; noted to have dysmetria her left arm (old from prior stroke in December) and visual disturbance; NIHSS - 1. CT head negative for acute stroke/ hemorrhage.    MRI brain reveals acute small left cerebellar infarct, MRA carotids possible right proximal ICA critical stenosis    # Acute cerebellar infarct ; vertical diplopia with LUE dysmetria; TPA not given as the sx were minimal and improving.    # ? Severe JODIE proximal stenosis VS motion artefact    - Coumadin with Aspirin till INR > 2.0  - atorvastatin 40 MG daily   - PT eval / Speech therapy / evaluate for rehab  - Carotid dopplers    D/W Dr. Pope; she tried reaching pts daughter to update her about condition and discuss disposition, as pt insists on going home today.

## 2017-06-28 NOTE — CHART NOTE - NSCHARTNOTEFT_GEN_A_CORE
Attempted to notify daughter Jing Omalley at [phone] / [phone], no answer. Will try again at later time

## 2017-06-28 NOTE — PROGRESS NOTE ADULT - ASSESSMENT
s/p PCI of RCA, OM1  Double vision - Acute left cerebellar stroke. Right ICA stenosis  PAF - currently in sinus  HTN  Aortic atheroma noted on MORGAN during last CVA.     Suggest:    Neuro follow up.   Carotid Doppler or Carotid CTA to evaluate ICA stenosis. Previous w/u for CVA in December showed moderate ICA stenosis. New CVA on the left cerebellum, not a JODIE territory  Continue current medications. Adjust coumadin dose to get therapeutic INR.   Daughter was called, no room for voicemail on the phone.

## 2017-06-28 NOTE — PROGRESS NOTE ADULT - SUBJECTIVE AND OBJECTIVE BOX
67 yo female with VERNON to RCA and OM1 yesterday. Versed and Fentanyl given during the PCI for sedation. Post procedure c/o double vision that is slowly improving. Gives previous h/o double vision when she used to dring heavily. She has had a previous stroke and has PAF, back on Coumadin. MRI is negative for acute stroke per prelim reading but revised reading shows left cerebellar acute infarct.    6/28/17 - Diplopia much better today. No AF, no chest pain, wants to go home.     Allergies:   No Known Drug Allergies      MEDICATIONS  (STANDING):  aspirin 325 milliGRAM(s) Oral daily  clopidogrel Tablet 75 milliGRAM(s) Oral daily  sacubitril 97 mG/valsartan 103 mG 1 Tablet(s) Oral two times a day  atorvastatin 80 milliGRAM(s) Oral at bedtime  carvedilol 6.25 milliGRAM(s) Oral every 12 hours  furosemide    Tablet 20 milliGRAM(s) Oral daily  levothyroxine 175 MICROGram(s) Oral daily  enoxaparin Injectable 90 milliGRAM(s) SubCutaneous every 12 hours    MEDICATIONS  (PRN):  ALBUTerol    90 MICROgram(s) HFA Inhaler 2 Puff(s) Inhalation every 6 hours PRN Shortness of Breath and/or Wheezing  acetaminophen   Tablet. 650 milliGRAM(s) Oral every 6 hours PRN Mild Pain (1 - 3)  melatonin 3 milliGRAM(s) Oral at bedtime PRN Insomnia      ROS:     Detailed ten system ROS was performed and was negative except for history as eluded to above.    no fever  no chills  no nausea  no vomiting  no diarrhea  no constipation  no melena  no hematochezia  no chest pain  no palpitations  no sob  no dyspnea  no cough  no wheezing  no anorexia  no headache  no dizziness  no syncope  no weakness  no myalgia  no dysuria  no polyuria  no hematuria       Vital Signs Last 24 Hrs  T(C): 37 (28 Jun 2017 08:53), Max: 37.1 (27 Jun 2017 15:40)  T(F): 98.6 (28 Jun 2017 08:53), Max: 98.8 (27 Jun 2017 15:40)  HR: 67 (28 Jun 2017 09:23) (56 - 95)  BP: 132/57 (28 Jun 2017 05:42) (132/57 - 174/63)  BP(mean): 71 (28 Jun 2017 05:42) (71 - 100)  RR: 26 (28 Jun 2017 09:23) (18 - 32)  SpO2: 94% (27 Jun 2017 20:00) (77% - 94%)    I&O's Summary    27 Jun 2017 07:01  -  28 Jun 2017 07:00  --------------------------------------------------------  IN: 660 mL / OUT: 200 mL / NET: 460 mL        PHYSICAL EXAM:    General Appearance:    Comfortable, AAO X 3, in no distress.   HEENT:                       Atraumatic, PERRLA, EOMI, conjunctiva clear.   Neck:                          Supple, no adenopathy, no thyromegaly, no JVD, no carotid bruit  Back:                          Symmetric, no CV angle fullness or tenderness, no soft tissue tenderness.  Chest:                         Clear to auscultation, no wheezes, rales or rhonchi, symmetric air entry, no tachypnea, retractions or cyanosis  Heart:                          S1, S2 normal, no gallop, no murmur.  Abdomen:                    Soft, non-tender, bowel sounds active. No palpable masses.   Extremities:                 no cyanosis, no edema, no joint swelling. Peripheral pulses normal  Skin:                           Skin color, texture normal. No rashes   Neurologic:                  Grossly cranial nerves intact, sensory and motor normal.      INTERPRETATION OF TELEMETRY:  Normal sinus rhythm with no tachy or clara events     ECG:  NSR, no ST T changes of ischemia or infarction.     LABS:                        13.3   10.6  )-----------( 350      ( 28 Jun 2017 05:06 )             40.8     06-27    139  |  105  |  13  ----------------------------<  97  4.1   |  28  |  0.58    Ca    10.4<H>      27 Jun 2017 04:53        Lipid Panel  Chl 109  HDL 54  LDL 39  Trg 81    Lipid Panel  Chl 122  HDL 57  LDL 50  Trg 75      PT/INR - ( 28 Jun 2017 05:06 )   PT: 12.5 sec;   INR: 1.15 ratio         RADIOLOGY & ADDITIONAL STUDIES:    MRA: Right carotid 1 cm defect at its proximal segment which may reflect a critical stenosis (greater than 99%) although images are degraded by patient motion.      MRI of Brain: Tiny acute lacunar infarction within the inferior LEFT cerebellum. Multiple old lacunar infarctions in the BILATERAL corona radiata and deep RIGHT white matter and BILATERAL cerebellum. Scattered

## 2017-06-28 NOTE — PROGRESS NOTE ADULT - SUBJECTIVE AND OBJECTIVE BOX
S&O:   Pt' s vision is improved but has intermittent diplopia looking at distant objects; has no speech disturbance, gait has improved slightly, was able to walk to bathroom.    Pt has poor insight into her condition, while discussing her stroke she only wants to talk about her dog and insists on going home ASAP.    MRI brain reveals small acute cerebellar infarct, MRA no VB stenosis; pt is on ASA/Coumadin/ Lipitor.  Carotid dopplers ordered for evaluation of ? JODIE critical stenosis; still pending      ROS: As above, other ROS NEGATIVE    MEDICATIONS  (STANDING):  aspirin 325 milliGRAM(s) Oral daily  clopidogrel Tablet 75 milliGRAM(s) Oral daily  sacubitril 97 mG/valsartan 103 mG 1 Tablet(s) Oral two times a day  atorvastatin 80 milliGRAM(s) Oral at bedtime  carvedilol 6.25 milliGRAM(s) Oral every 12 hours  furosemide    Tablet 20 milliGRAM(s) Oral daily  levothyroxine 175 MICROGram(s) Oral daily  enoxaparin Injectable 90 milliGRAM(s) SubCutaneous every 12 hours  warfarin 5 milliGRAM(s) Oral once      Vital Signs Last 24 Hrs  T(C): 37 (28 Jun 2017 08:53), Max: 37.1 (27 Jun 2017 15:40)  T(F): 98.6 (28 Jun 2017 08:53), Max: 98.8 (27 Jun 2017 15:40)  HR: 67 (28 Jun 2017 09:23) (56 - 95)  BP: 132/57 (28 Jun 2017 05:42) (132/57 - 174/63)  BP(mean): 71 (28 Jun 2017 05:42) (71 - 100)  RR: 26 (28 Jun 2017 09:23) (18 - 32)  SpO2: 94% (27 Jun 2017 20:00) (77% - 94%)    Neurological exam:  HF: A x O x 3, very anxious. Speech fluent, borderline dysarthria, No Aphasia. Naming /repetition intact   CN: PEARRL, EOMI, vertical nystagmus, diplopia vertical for distance, facial sensation normal, no NLFD, tongue midline, Palate moves equally, SCM equal bilaterally  Motor: No pronator drift, Strength 5/5 in all 4 ext, normal bulk and tone, no tremor    Sens: Intact to light touch / PP/ VS/ JS    Reflexes: Symmetric and normal toes b/l  Coord: dysmetria with finger to nose  LUE  Gait/Balance: Not tested   NIHSS: 2             Radiology report:  - MRI brain: < from: MRI Head w/o Cont (06.26.17 @ 22:57) >  Tiny acute lacunar infarction within the inferior LEFT   cerebellum. Multiple old lacunar infarctions in the BILATERAL corona   radiata and deep RIGHT white matter and BILATERAL cerebellum. Scattered   periventricular and bifrontal subcortical white matter ischemia is noted.     - MRA brain/carotids:   Focal stenosis at the proximal LEFT A1 segment.     Fetal   origin to the RIGHT posterior cerebral artery with distal attenuation.    Right carotid system: 1 cm defect at its proximal segment which may   reflect a critical stenosis (greater than 99%) although images are   degraded by patient motion. CTA may be utilized for confirmation of   this finding.    Left carotid system:  No hemodynamically significant stenosis.                                13.3   10.6  )-----------( 350      ( 28 Jun 2017 05:06 )             40.8     06-27    139  |  105  |  13  ----------------------------<  97  4.1   |  28  |  0.58    Ca    10.4<H>      27 Jun 2017 04:53      06-27 [id number] 6.3    06-28 Chol 109 LDL 39 HDL 54 Trig 81, 06-27 Chol 122 LDL 50 HDL 57 Trig 75

## 2017-06-28 NOTE — PROGRESS NOTE ADULT - SUBJECTIVE AND OBJECTIVE BOX
66yr old female PMH HTN, systolic & diastolic CHF, pulmonary hypertension, chronic hypercarbic respiratory failure, CVA with left sided weakness, echo  revealed EF of 30-35%, MORGAN demonstrated mobile atheroma in the aortic arch moderate to severe mitral regurgitation and moderate to severe tricuspid regurgitation. Repeat echo 2017 revealed improved EF and improvement of tricuspid regurgitation. Pt. had c/o dyspnea with moderate exertion, she underwent a nuclear stress test which revealed moderate to large sized moderately severe inferior lateral wall ischemia. Pt presented to  on 17 for elective card cath underwent stenting to RCA/OM1 on  when code stroke was called for complaints of blurry vision. MRI then revealed tiny acute atroke in Left cerebellum and patient was tranferred to CCU. Patient still complains of intermittent blurry vision. Refuses to go to rehab. Is able to ambulate unassisted > 150 ft. This am has no complaints of dipolopia. INR 1.15.         ICU Vital Signs Last 24 Hrs  T(C): 37 (2017 08:53), Max: 37.1 (2017 15:40)  T(F): 98.6 (2017 08:53), Max: 98.8 (2017 15:40)  HR: 67 (2017 09:23) (56 - 95)  BP: 132/57 (2017 05:42) (132/57 - 174/63)  BP(mean): 71 (2017 05:42) (71 - 100)  ABP: --  ABP(mean): --  RR: 26 (2017 09:23) (18 - 32)  SpO2: 94% (2017 20:00) (77% - 94%)      PHYSICAL EXAM:    Constitutional: NAD, awake and alert, well-developed  HEENT: PERR, EOMI, Normal Hearing, MMM  Neck: Soft and supple, No LAD, No JVD  Respiratory: Breath sounds are clear bilaterally, No wheezing, rales or rhonchi  Cardiovascular: S1 and S2, regular rate and rhythm, no Murmurs, gallops or rubs  Gastrointestinal: Bowel Sounds present, soft, nontender, nondistended, no guarding, no rebound  Extremities: No peripheral edema  Vascular: 2+ peripheral pulses  Neurological: A/O x 3, no focal deficits, + nystagumus, 5/5 motor thorughout, no sensory defecitis. +2 DTRs  Musculoskeletal: 5/5 strength b/l upper and lower extremities  Skin: No rashes          LABS:                        13.7   8.9   )-----------( 321      ( 2017 04:53 )             40.5     06    139  |  105  |  13  ----------------------------<  97  4.1   |  28  |  0.58    Ca    10.4<H>      2017 04:53  PT/INR - ( 2017 08:30 )   PT: 11.6 sec;   INR: 1.07 ratio     PTT - ( 2017 08:30 )  PTT:33.3 sec    RADIOLOGY & ADDITIONAL TESTS:  EXAM:  BRAIN (MRI) W O CON                        PROCEDURE DATE:  2017    INTERPRETATION:    MR brain  without gadolinium     CLINICAL INFORMATION:   Double vision in both eyes for one day, Stroke.    TECHNIQUE:   Sagittal and axial T1-weighted images, axial FLAIR images,   axial gradient echo and T2-weighted images and axial diffusion weighted   images of the brain were obtained.         FINDINGS:   CT dated 2017 available for review.         The brain demonstrates a tiny acute lacunar infarction within the   inferior LEFT cerebellum. There are multiple old lacunar infarctions in   the BILATERAL corona radiata and deep RIGHT white matter and BILATERAL   cerebellum. Scattered periventricular and bifrontal subcortical white   matter ischemia is noted.   No acute cerebral cortical infarct is found.     No intracranial hemorrhage is recognized. No mass effect is found in the   brain.      The ventricles, sulci and basal cisterns appear unremarkable.  The vertebral and internal carotid arteries demonstrate expected flow   voids indicating their patency.       The orbits are unremarkable.  The paranasal sinuses are clear.  The nasal   cavity appears intact.  The nasopharynx is symmetric.  The central skull   base and temporal bones are intact.  The calvarium appears unremarkable.    IMPRESSION:   Tiny acute lacunar infarction within the inferior LEFT   cerebellum. Multiple old lacunar infarctions in the BILATERAL corona   radiata and deep RIGHT white matter and BILATERAL cerebellum. Scattered   periventricular and bifrontal subcortical white matter ischemia is noted.     EXAM:  CAROTID NECK(MRA)W O CON                        PROCEDURE DATE:  2017      INTERPRETATION:  MR angiography of the carotid circulation.  (Non   gadolinium technique.)      CLINICAL INFORMATION:      TIA, Stroke.          TECHNIQUE:  Two dimensional time of flight MR angiography of the carotid   circulation was performed.  This data set was reconstructed as maximum   intensity pixel images displayed in multiple rotations.  An additional   three dimensional time of flight acquisition was obtained with anatomic   coverage limited to the carotid bulbs.       The magnitude of stenosis   was evaluated based on the diameter of an intact distal of the internal   carotid artery using NASCET criteria.    FINDINGS:   No prior similar studies are available for review.  The right carotid system demonstrates an intact common carotid artery.    The carotid bulb is intact without hemodynamically significant stenosis.    The internal carotid artery shows a 1 cm defect at its proximal segment   which may reflect a critical stenosis (greater than 99%) although images   are degraded by patient motion. The origin and initial segment of the   right external carotid artery also appears intact.  The left carotid system demonstrates an intact common carotid artery.    The carotid bulb is intact without hemodynamically significant stenosis.    The internal carotid artery is intact to the level the skull base.  The   origin and initial segment of the left external carotid artery also   appears intact.  The vertebral arteries demonstrate antegrade flow.  The degree of   vertebral asymmetry is within physiologic variation.    IMPRESSION:  Images are degraded by patient motion.    1.  Right carotid system:   1 cm defect at its proximal segment which may   reflect a critical stenosis (greater than 99%) although images are   degraded by patient motion.    CTA may be utilized for confirmation of   this finding.  2.  Left carotid system:   No hemodynamically significant stenosis.     EXAM:  HEAD (MRA)W O CON                        PROCEDURE DATE:  2017      INTERPRETATION:      MR angiography of the intracranial circulation.  (Non gadolinium   technique.    CLINICAL INFORMATION:  stroke; vascular stenosis.    TECHNIQUE:  MR angiography of intracranial circulation was performed   using three-dimensional time of flight technique. Post processing   angiographic reconstruction of images was performed. This data set was   reconstructed as maximum intensity pixel images and displayed in multiple   rotations.    FINDINGS:   No comparison similar studies are available.  The anterior circulation demonstrates intact petrous, cavernous and   clinoid segments of the internal carotid arteries.  The anterior cerebral  arteries are intact with a focal stenosis at the proximal LEFT A1   segment. An anterior communicating artery is demonstrated.  The middle   cerebral arteries demonstrate intact initial M1 and M2 segments. There   are symmetric intact peripheral Sylvian branches.  The posterior circulation demonstrates intact vertebral and basilar   arteries.  There is a fetal origin to the RIGHT posterior cerebral artery   with distal attenuation.  No abnormal vessel termination, vascular malformation or intracranial   aneurysm is recognized.    IMPRESSION:   Focal stenosis at the proximal LEFT A1 segment.     Fetal   origin to the RIGHT posterior cerebral artery with distal attenuation.             Daily Weight in k.9 (2017 05:41)      aspirin 325 milliGRAM(s) Oral daily  clopidogrel Tablet 75 milliGRAM(s) Oral daily  sacubitril 97 mG/valsartan 103 mG 1 Tablet(s) Oral two times a day  atorvastatin 80 milliGRAM(s) Oral at bedtime  carvedilol 6.25 milliGRAM(s) Oral every 12 hours  ALBUTerol    90 MICROgram(s) HFA Inhaler 2 Puff(s) Inhalation every 6 hours PRN  furosemide    Tablet 20 milliGRAM(s) Oral daily  levothyroxine 175 MICROGram(s) Oral daily  acetaminophen   Tablet. 650 milliGRAM(s) Oral every 6 hours PRN  warfarin 10 milliGRAM(s) Oral once      Assessment/Plan  #Acute cerebellar CVA/h/o old CVA with LT-sided residual weakness  No TPA given current admission (see details in neuro note)  Aspirin, Coumadin-  D/W Dr mendoza, may start lovenox for bridging, no risk of hemorrhagic conversion as small infarct  Resume coumadin, check INR tomorrow  Home PT, Daughter will need to assume care for patient as patient does not qualify for [facility] at this juncture as she is ambulating unassisted and without difficulty  she still stands and poses risk of falls in the future as she has intermittent diplopia and may risk bleeding jimenez she is on high dose a/c. Her daughter will need to coordinate care to supervise her mother to prevent these risks.  ECHO  Cardio and neuro f/u    #CAD s/p stent LCX  Cardio f/u    #Chr syst/diast CHF  Compensated    #Dispo- hospitalist team will follow with patient

## 2017-06-29 LAB
HCT VFR BLD CALC: 41 % — SIGNIFICANT CHANGE UP (ref 34.5–45)
HGB BLD-MCNC: 13.4 G/DL — SIGNIFICANT CHANGE UP (ref 11.5–15.5)
INR BLD: 1.14 RATIO — SIGNIFICANT CHANGE UP (ref 0.88–1.16)
MCHC RBC-ENTMCNC: 29.5 PG — SIGNIFICANT CHANGE UP (ref 27–34)
MCHC RBC-ENTMCNC: 32.8 GM/DL — SIGNIFICANT CHANGE UP (ref 32–36)
MCV RBC AUTO: 90 FL — SIGNIFICANT CHANGE UP (ref 80–100)
PLATELET # BLD AUTO: 311 K/UL — SIGNIFICANT CHANGE UP (ref 150–400)
PROTHROM AB SERPL-ACNC: 12.4 SEC — SIGNIFICANT CHANGE UP (ref 9.8–12.7)
RBC # BLD: 4.56 M/UL — SIGNIFICANT CHANGE UP (ref 3.8–5.2)
RBC # FLD: 13.2 % — SIGNIFICANT CHANGE UP (ref 10.3–14.5)
WBC # BLD: 8.7 K/UL — SIGNIFICANT CHANGE UP (ref 3.8–10.5)
WBC # FLD AUTO: 8.7 K/UL — SIGNIFICANT CHANGE UP (ref 3.8–10.5)

## 2017-06-29 RX ORDER — WARFARIN SODIUM 2.5 MG/1
5 TABLET ORAL ONCE
Qty: 0 | Refills: 0 | Status: COMPLETED | OUTPATIENT
Start: 2017-06-29 | End: 2017-06-29

## 2017-06-29 RX ADMIN — ENOXAPARIN SODIUM 90 MILLIGRAM(S): 100 INJECTION SUBCUTANEOUS at 06:09

## 2017-06-29 RX ADMIN — Medication 20 MILLIGRAM(S): at 06:09

## 2017-06-29 RX ADMIN — WARFARIN SODIUM 5 MILLIGRAM(S): 2.5 TABLET ORAL at 21:18

## 2017-06-29 RX ADMIN — CARVEDILOL PHOSPHATE 6.25 MILLIGRAM(S): 80 CAPSULE, EXTENDED RELEASE ORAL at 06:09

## 2017-06-29 RX ADMIN — ATORVASTATIN CALCIUM 80 MILLIGRAM(S): 80 TABLET, FILM COATED ORAL at 21:18

## 2017-06-29 RX ADMIN — CARVEDILOL PHOSPHATE 6.25 MILLIGRAM(S): 80 CAPSULE, EXTENDED RELEASE ORAL at 17:04

## 2017-06-29 RX ADMIN — SACUBITRIL AND VALSARTAN 1 TABLET(S): 24; 26 TABLET, FILM COATED ORAL at 06:09

## 2017-06-29 RX ADMIN — Medication 650 MILLIGRAM(S): at 11:33

## 2017-06-29 RX ADMIN — Medication 175 MICROGRAM(S): at 06:09

## 2017-06-29 RX ADMIN — Medication 650 MILLIGRAM(S): at 17:03

## 2017-06-29 RX ADMIN — CLOPIDOGREL BISULFATE 75 MILLIGRAM(S): 75 TABLET, FILM COATED ORAL at 11:33

## 2017-06-29 RX ADMIN — ENOXAPARIN SODIUM 90 MILLIGRAM(S): 100 INJECTION SUBCUTANEOUS at 17:04

## 2017-06-29 RX ADMIN — Medication 325 MILLIGRAM(S): at 11:33

## 2017-06-29 RX ADMIN — Medication 650 MILLIGRAM(S): at 15:23

## 2017-06-29 RX ADMIN — SACUBITRIL AND VALSARTAN 1 TABLET(S): 24; 26 TABLET, FILM COATED ORAL at 17:03

## 2017-06-29 NOTE — PROGRESS NOTE ADULT - ASSESSMENT
Unstable angina   CAD, s/p PCI of RCA, OM1  Double vision - Acute left cerebellar stroke. Right ICA stenosis  PAF - currently in sinus  HTN  Mobile aortic atheroma noted on MORGAN during last CVA.     Suggest:    New CVA on the left cerebellum, not a JODIE territory  Out pt evaluation for revascularization of JODIE. High risk for embolization from mobile aortic arch atheroma.  Continue current medications.   Adjust coumadin dose to get therapeutic INR.

## 2017-06-29 NOTE — PROGRESS NOTE ADULT - SUBJECTIVE AND OBJECTIVE BOX
67 yo female with VERNON to RCA and OM1 yesterday. Versed and Fentanyl given during the PCI for sedation. Post procedure c/o double vision that is slowly improving. Gives previous h/o double vision when she used to dring heavily. She has had a previous stroke and has PAF, back on Coumadin. MRI is negative for acute stroke per prelim reading but revised reading shows left cerebellar acute infarct.    6/28/17 - Diplopia much better today. No AF, no chest pain, wants to go home.     6/29/17- Feels better today. Minimal diplopia. no chest pain. No other events.    Allergies:   No Known Drug Allergies      MEDICATIONS  (STANDING):  aspirin 325 milliGRAM(s) Oral daily  clopidogrel Tablet 75 milliGRAM(s) Oral daily  sacubitril 97 mG/valsartan 103 mG 1 Tablet(s) Oral two times a day  atorvastatin 80 milliGRAM(s) Oral at bedtime  carvedilol 6.25 milliGRAM(s) Oral every 12 hours  furosemide    Tablet 20 milliGRAM(s) Oral daily  levothyroxine 175 MICROGram(s) Oral daily  enoxaparin Injectable 90 milliGRAM(s) SubCutaneous every 12 hours  warfarin 5 milliGRAM(s) Oral once    MEDICATIONS  (PRN):  ALBUTerol    90 MICROgram(s) HFA Inhaler 2 Puff(s) Inhalation every 6 hours PRN Shortness of Breath and/or Wheezing  acetaminophen   Tablet. 650 milliGRAM(s) Oral every 6 hours PRN Mild Pain (1 - 3)  melatonin 3 milliGRAM(s) Oral at bedtime PRN Insomnia      ROS:     Detailed ten system ROS was performed and was negative except for history as eluded to above.    no fever  no chills  no nausea  no vomiting  no diarrhea  no constipation  no melena  no hematochezia  no chest pain  no palpitations  no sob  no dyspnea  no cough  no wheezing  no anorexia  no headache  no dizziness  no syncope  no weakness  no myalgia  no dysuria  no polyuria  no hematuria       Vital Signs Last 24 Hrs  T(C): 37 (29 Jun 2017 16:00), Max: 37 (29 Jun 2017 16:00)  T(F): 98.6 (29 Jun 2017 16:00), Max: 98.6 (29 Jun 2017 16:00)  HR: 80 (29 Jun 2017 17:09) (55 - 86)  BP: 128/68 (29 Jun 2017 17:09) (95/73 - 161/68)  BP(mean): 84 (29 Jun 2017 17:09) (69 - 92)  RR: 20 (29 Jun 2017 17:09) (12 - 34)  SpO2: 97% (28 Jun 2017 19:45) (97% - 97%)    I&O's Summary      PHYSICAL EXAM:    General Appearance:    Comfortable, AAO X 3, in no distress.   HEENT:                       Atraumatic, PERRLA, EOMI, conjunctiva clear.   Neck:                          Supple, no adenopathy, no thyromegaly, no JVD, no carotid bruit  Back:                          Symmetric, no CV angle fullness or tenderness, no soft tissue tenderness.  Chest:                         Clear to auscultation, no wheezes, rales or rhonchi, symmetric air entry, no tachypnea, retractions or cyanosis  Heart:                          S1, S2 normal, no gallop, no murmur.  Abdomen:                    Soft, non-tender, bowel sounds active. No palpable masses.   Extremities:                 no cyanosis, no edema, no joint swelling. Peripheral pulses normal  Skin:                           Skin color, texture normal. No rashes   Neurologic:                  Grossly cranial nerves intact, sensory and motor normal.      INTERPRETATION OF TELEMETRY:  Normal sinus rhythm with no tachy or clara events     ECG:  NSR, no ST T changes of ischemia or infarction.     LABS:                        13.4   8.7   )-----------( 311      ( 29 Jun 2017 05:40 )             41.0       Lipid Panel  Chl 109  HDL 54  LDL 39  Trg 81    Lipid Panel  Chl 122  HDL 57  LDL 50  Trg 75      PT/INR - ( 29 Jun 2017 05:40 )   PT: 12.4 sec;   INR: 1.14 ratio      RADIOLOGY & ADDITIONAL STUDIES:    Carotid Doppler: +ve JODIE severe stenosis.    MRA: Right carotid 1 cm defect at its proximal segment which may reflect a critical stenosis (greater than 99%) although images are degraded by patient motion.      MRI of Brain: Tiny acute lacunar infarction within the inferior LEFT cerebellum. Multiple old lacunar infarctions in the BILATERAL corona radiata and deep RIGHT white matter and BILATERAL cerebellum. Scattered periventricular and bifrontal subcortical white matter ischemia is noted.

## 2017-06-29 NOTE — PROGRESS NOTE ADULT - SUBJECTIVE AND OBJECTIVE BOX
66yr old female PMH HTN, systolic & diastolic CHF, pulmonary hypertension, chronic hypercarbic respiratory failure, CVA with left sided weakness, echo  revealed EF of 30-35%, MORGAN demonstrated mobile atheroma in the aortic arch moderate to severe mitral regurgitation and moderate to severe tricuspid regurgitation. Repeat echo 2017 revealed improved EF and improvement of tricuspid regurgitation. Pt. had c/o dyspnea with moderate exertion, she underwent a nuclear stress test which revealed moderate to large sized moderately severe inferior lateral wall ischemia. Pt presented to  on 17 for elective card cath underwent stenting to RCA/OM1 on  when code stroke was called for complaints of blurry vision. MRI then revealed tiny acute atroke in Left cerebellum and patient was tranferred to CCU. Patient still complains of intermittent blurry vision. Refuses to go to rehab. Is able to ambulate unassisted > 150 ft. This am has no complaints of dipolopia. INR 1.15.     : still c/o of intermittent diplopia at times although she feels it is improving daily. Still adamant about returning to home. Daughter has not arranged for any increased home care/supervision. Will reach out to her prior to discharge as as she will need to assume care for safe discharge INR remains subtherapeutic        ICU Vital Signs Last 24 Hrs  T(C): 37 (2017 08:53), Max: 37.1 (2017 15:40)  T(F): 98.6 (2017 08:53), Max: 98.8 (2017 15:40)  HR: 67 (2017 09:23) (56 - 95)  BP: 132/57 (2017 05:42) (132/57 - 174/63)  BP(mean): 71 (2017 05:42) (71 - 100)  ABP: --  ABP(mean): --  RR: 26 (2017 09:23) (18 - 32)  SpO2: 94% (2017 20:00) (77% - 94%)      PHYSICAL EXAM:    Constitutional: NAD, awake and alert, well-developed  HEENT: PERR, EOMI, Normal Hearing, MMM + nystagmus  Neck: Soft and supple, No LAD, No JVD  Respiratory: Breath sounds are clear bilaterally, No wheezing, rales or rhonchi  Cardiovascular: S1 and S2, regular rate and rhythm, no Murmurs, gallops or rubs  Gastrointestinal: Bowel Sounds present, soft, nontender, nondistended, no guarding, no rebound  Extremities: No peripheral edema  Vascular: 2+ peripheral pulses  Neurological: A/O x 3, no focal deficits, + nystagumus, 5/5 motor throughout no sensory deficits +2 DTRs  Musculoskeletal: 5/5 strength b/l upper and lower extremities  Skin: No rashes          LABS:                        13.7   8.9   )-----------( 321      ( 2017 04:53 )             40.5     06-27    139  |  105  |  13  ----------------------------<  97  4.1   |  28  |  0.58    Ca    10.4<H>      2017 04:53  PT/INR - ( 2017 08:30 )   PT: 11.6 sec;   INR: 1.07 ratio     PTT - ( 2017 08:30 )  PTT:33.3 sec    RADIOLOGY & ADDITIONAL TESTS:  EXAM:  BRAIN (MRI) W O CON                        PROCEDURE DATE:  2017    INTERPRETATION:    MR brain  without gadolinium     CLINICAL INFORMATION:   Double vision in both eyes for one day, Stroke.    TECHNIQUE:   Sagittal and axial T1-weighted images, axial FLAIR images,   axial gradient echo and T2-weighted images and axial diffusion weighted   images of the brain were obtained.         FINDINGS:   CT dated 2017 available for review.         The brain demonstrates a tiny acute lacunar infarction within the   inferior LEFT cerebellum. There are multiple old lacunar infarctions in   the BILATERAL corona radiata and deep RIGHT white matter and BILATERAL   cerebellum. Scattered periventricular and bifrontal subcortical white   matter ischemia is noted.   No acute cerebral cortical infarct is found.     No intracranial hemorrhage is recognized. No mass effect is found in the   brain.      The ventricles, sulci and basal cisterns appear unremarkable.  The vertebral and internal carotid arteries demonstrate expected flow   voids indicating their patency.       The orbits are unremarkable.  The paranasal sinuses are clear.  The nasal   cavity appears intact.  The nasopharynx is symmetric.  The central skull   base and temporal bones are intact.  The calvarium appears unremarkable.    IMPRESSION:   Tiny acute lacunar infarction within the inferior LEFT   cerebellum. Multiple old lacunar infarctions in the BILATERAL corona   radiata and deep RIGHT white matter and BILATERAL cerebellum. Scattered   periventricular and bifrontal subcortical white matter ischemia is noted.     EXAM:  CAROTID NECK(MRA)W O CON                        PROCEDURE DATE:  2017      INTERPRETATION:  MR angiography of the carotid circulation.  (Non   gadolinium technique.)      CLINICAL INFORMATION:      TIA, Stroke.          TECHNIQUE:  Two dimensional time of flight MR angiography of the carotid   circulation was performed.  This data set was reconstructed as maximum   intensity pixel images displayed in multiple rotations.  An additional   three dimensional time of flight acquisition was obtained with anatomic   coverage limited to the carotid bulbs.       The magnitude of stenosis   was evaluated based on the diameter of an intact distal of the internal   carotid artery using NASCET criteria.    FINDINGS:   No prior similar studies are available for review.  The right carotid system demonstrates an intact common carotid artery.    The carotid bulb is intact without hemodynamically significant stenosis.    The internal carotid artery shows a 1 cm defect at its proximal segment   which may reflect a critical stenosis (greater than 99%) although images   are degraded by patient motion. The origin and initial segment of the   right external carotid artery also appears intact.  The left carotid system demonstrates an intact common carotid artery.    The carotid bulb is intact without hemodynamically significant stenosis.    The internal carotid artery is intact to the level the skull base.  The   origin and initial segment of the left external carotid artery also   appears intact.  The vertebral arteries demonstrate antegrade flow.  The degree of   vertebral asymmetry is within physiologic variation.    IMPRESSION:  Images are degraded by patient motion.    1.  Right carotid system:   1 cm defect at its proximal segment which may   reflect a critical stenosis (greater than 99%) although images are   degraded by patient motion.    CTA may be utilized for confirmation of   this finding.  2.  Left carotid system:   No hemodynamically significant stenosis.     EXAM:  HEAD (MRA)W O CON                        PROCEDURE DATE:  2017      INTERPRETATION:      MR angiography of the intracranial circulation.  (Non gadolinium   technique.    CLINICAL INFORMATION:  stroke; vascular stenosis.    TECHNIQUE:  MR angiography of intracranial circulation was performed   using three-dimensional time of flight technique. Post processing   angiographic reconstruction of images was performed. This data set was   reconstructed as maximum intensity pixel images and displayed in multiple   rotations.    FINDINGS:   No comparison similar studies are available.  The anterior circulation demonstrates intact petrous, cavernous and   clinoid segments of the internal carotid arteries.  The anterior cerebral  arteries are intact with a focal stenosis at the proximal LEFT A1   segment. An anterior communicating artery is demonstrated.  The middle   cerebral arteries demonstrate intact initial M1 and M2 segments. There   are symmetric intact peripheral Sylvian branches.  The posterior circulation demonstrates intact vertebral and basilar   arteries.  There is a fetal origin to the RIGHT posterior cerebral artery   with distal attenuation.  No abnormal vessel termination, vascular malformation or intracranial   aneurysm is recognized.    IMPRESSION:   Focal stenosis at the proximal LEFT A1 segment.     Fetal   origin to the RIGHT posterior cerebral artery with distal attenuation.             Daily Weight in k.9 (2017 05:41)      aspirin 325 milliGRAM(s) Oral daily  clopidogrel Tablet 75 milliGRAM(s) Oral daily  sacubitril 97 mG/valsartan 103 mG 1 Tablet(s) Oral two times a day  atorvastatin 80 milliGRAM(s) Oral at bedtime  carvedilol 6.25 milliGRAM(s) Oral every 12 hours  ALBUTerol    90 MICROgram(s) HFA Inhaler 2 Puff(s) Inhalation every 6 hours PRN  furosemide    Tablet 20 milliGRAM(s) Oral daily  levothyroxine 175 MICROGram(s) Oral daily  acetaminophen   Tablet. 650 milliGRAM(s) Oral every 6 hours PRN  warfarin 10 milliGRAM(s) Oral once      Assessment/Plan  #Acute cerebellar CVA/h/o old CVA with LT-sided residual weakness  No TPA given current admission (see details in neuro note)  Aspirin, Coumadin- subtx  Carotid doppler: critical stenosis to R ICA/ 50-70% to L ICA. Vascular consult to assess candidacy for stenting - pt aware  D/W Dr mendoza, may start lovenox for bridging, no risk of hemorrhagic conversion as small infarct  Resume coumadin, check INR tomorrow  Home PT, Daughter will need to assume care for patient as patient does not qualify for WILLIS at this juncture as she is ambulating unassisted and without difficulty  she still stands and poses risk of falls in the future as she has intermittent diplopia and may risk bleeding jimenez she is on high dose a/c. Her daughter will need to coordinate care to supervise her mother to prevent these risks.  ECHO noted  Cardio and neuro f/u    #CAD s/p stent LCX  Cardio f/u    #Chr syst/diast CHF  Compensated    #Dispo- hospitalist team will follow with patient

## 2017-06-29 NOTE — PROGRESS NOTE ADULT - NSHPATTENDINGPLANDISCUSS_GEN_ALL_CORE
pt
pt, daughter, neurology, cariac and vascular svcs
Dr Ramya Pope
Dr Ramya Pope
Neurologist Dr Daugherty

## 2017-06-30 VITALS — TEMPERATURE: 97 F

## 2017-06-30 LAB
HCT VFR BLD CALC: 38.7 % — SIGNIFICANT CHANGE UP (ref 34.5–45)
HGB BLD-MCNC: 12.9 G/DL — SIGNIFICANT CHANGE UP (ref 11.5–15.5)
INR BLD: 1.29 RATIO — HIGH (ref 0.88–1.16)
MCHC RBC-ENTMCNC: 29.8 PG — SIGNIFICANT CHANGE UP (ref 27–34)
MCHC RBC-ENTMCNC: 33.3 GM/DL — SIGNIFICANT CHANGE UP (ref 32–36)
MCV RBC AUTO: 89.5 FL — SIGNIFICANT CHANGE UP (ref 80–100)
PLATELET # BLD AUTO: 340 K/UL — SIGNIFICANT CHANGE UP (ref 150–400)
PROTHROM AB SERPL-ACNC: 14 SEC — HIGH (ref 9.8–12.7)
RBC # BLD: 4.33 M/UL — SIGNIFICANT CHANGE UP (ref 3.8–5.2)
RBC # FLD: 13 % — SIGNIFICANT CHANGE UP (ref 10.3–14.5)
WBC # BLD: 8.3 K/UL — SIGNIFICANT CHANGE UP (ref 3.8–10.5)
WBC # FLD AUTO: 8.3 K/UL — SIGNIFICANT CHANGE UP (ref 3.8–10.5)

## 2017-06-30 RX ORDER — CARVEDILOL PHOSPHATE 80 MG/1
1 CAPSULE, EXTENDED RELEASE ORAL
Qty: 0 | Refills: 0 | COMMUNITY

## 2017-06-30 RX ORDER — SACUBITRIL AND VALSARTAN 24; 26 MG/1; MG/1
1 TABLET, FILM COATED ORAL
Qty: 60 | Refills: 0 | OUTPATIENT
Start: 2017-06-30

## 2017-06-30 RX ORDER — WARFARIN SODIUM 2.5 MG/1
1 TABLET ORAL
Qty: 0 | Refills: 0 | COMMUNITY

## 2017-06-30 RX ORDER — CLOPIDOGREL BISULFATE 75 MG/1
1 TABLET, FILM COATED ORAL
Qty: 30 | Refills: 0 | OUTPATIENT
Start: 2017-06-30

## 2017-06-30 RX ORDER — ACETAMINOPHEN 500 MG
2 TABLET ORAL
Qty: 0 | Refills: 0 | COMMUNITY
Start: 2017-06-30

## 2017-06-30 RX ORDER — ROSUVASTATIN CALCIUM 5 MG/1
1 TABLET ORAL
Qty: 0 | Refills: 0 | COMMUNITY

## 2017-06-30 RX ORDER — WARFARIN SODIUM 2.5 MG/1
1 TABLET ORAL
Qty: 30 | Refills: 0 | OUTPATIENT
Start: 2017-06-30

## 2017-06-30 RX ORDER — CARVEDILOL PHOSPHATE 80 MG/1
1 CAPSULE, EXTENDED RELEASE ORAL
Qty: 60 | Refills: 0 | OUTPATIENT
Start: 2017-06-30

## 2017-06-30 RX ORDER — ATORVASTATIN CALCIUM 80 MG/1
1 TABLET, FILM COATED ORAL
Qty: 30 | Refills: 0 | OUTPATIENT
Start: 2017-06-30

## 2017-06-30 RX ORDER — ALBUTEROL 90 UG/1
0 AEROSOL, METERED ORAL
Qty: 0 | Refills: 0 | COMMUNITY

## 2017-06-30 RX ORDER — ENOXAPARIN SODIUM 100 MG/ML
90 INJECTION SUBCUTANEOUS
Qty: 20 | Refills: 0 | OUTPATIENT
Start: 2017-06-30

## 2017-06-30 RX ORDER — SACUBITRIL AND VALSARTAN 24; 26 MG/1; MG/1
1 TABLET, FILM COATED ORAL
Qty: 0 | Refills: 0 | COMMUNITY

## 2017-06-30 RX ORDER — LISINOPRIL 2.5 MG/1
1 TABLET ORAL
Qty: 0 | Refills: 0 | COMMUNITY

## 2017-06-30 RX ADMIN — ATORVASTATIN CALCIUM 80 MILLIGRAM(S): 80 TABLET, FILM COATED ORAL at 18:05

## 2017-06-30 RX ADMIN — Medication 325 MILLIGRAM(S): at 14:25

## 2017-06-30 RX ADMIN — CARVEDILOL PHOSPHATE 6.25 MILLIGRAM(S): 80 CAPSULE, EXTENDED RELEASE ORAL at 18:04

## 2017-06-30 RX ADMIN — ENOXAPARIN SODIUM 90 MILLIGRAM(S): 100 INJECTION SUBCUTANEOUS at 18:04

## 2017-06-30 RX ADMIN — CARVEDILOL PHOSPHATE 6.25 MILLIGRAM(S): 80 CAPSULE, EXTENDED RELEASE ORAL at 05:38

## 2017-06-30 RX ADMIN — CLOPIDOGREL BISULFATE 75 MILLIGRAM(S): 75 TABLET, FILM COATED ORAL at 14:26

## 2017-06-30 RX ADMIN — SACUBITRIL AND VALSARTAN 1 TABLET(S): 24; 26 TABLET, FILM COATED ORAL at 18:03

## 2017-06-30 RX ADMIN — Medication 3 MILLIGRAM(S): at 00:29

## 2017-06-30 RX ADMIN — Medication 20 MILLIGRAM(S): at 05:38

## 2017-06-30 RX ADMIN — SACUBITRIL AND VALSARTAN 1 TABLET(S): 24; 26 TABLET, FILM COATED ORAL at 05:38

## 2017-06-30 RX ADMIN — Medication 175 MICROGRAM(S): at 05:38

## 2017-06-30 RX ADMIN — ENOXAPARIN SODIUM 90 MILLIGRAM(S): 100 INJECTION SUBCUTANEOUS at 05:39

## 2017-06-30 NOTE — DISCHARGE NOTE ADULT - CARE PROVIDER_API CALL
Farshad Lam), Hematology; Internal Medicine  [address] [place] [address]  Phone: [phone]  Fax: [phone]    Kike Rodriguez), Cardiology; Interventional Cardiology  [address] [place] [address]  Phone: [phone]  Fax: [phone]

## 2017-06-30 NOTE — DISCHARGE NOTE ADULT - MEDICATION SUMMARY - MEDICATIONS TO TAKE
I will START or STAY ON the medications listed below when I get home from the hospital:    aspirin 81 mg oral tablet  -- 1 tab(s) by mouth once a day  -- Indication: For CAD    acetaminophen 325 mg oral tablet  -- 2 tab(s) by mouth every 6 hours, As needed, Mild Pain (1 - 3)  -- Indication: For Pain    sacubitril-valsartan 97 mg-103 mg oral tablet  -- 1 tab(s) by mouth 2 times a day  -- Indication: For HTN (hypertension)    Lovenox 100 mg/mL injectable solution  -- 90 milligram(s) injectable 2 times a day  -- It is very important that you take or use this exactly as directed.  Do not skip doses or discontinue unless directed by your doctor.    -- Indication: For STroke    Coumadin 5 mg oral tablet  -- 1 tab(s) by mouth once a day  -- Indication: For Stroke    atorvastatin 80 mg oral tablet  -- 1 tab(s) by mouth once a day (at bedtime)  -- Indication: For CAD    clopidogrel 75 mg oral tablet  -- 1 tab(s) by mouth once a day  -- Indication: For CAD    Coreg 6.25 mg oral tablet  -- 1 tab(s) by mouth 2 times a day  -- Indication: For HTN (hypertension)    Lasix 20 mg oral tablet  -- 1 tab(s) by mouth once a day  -- Indication: For Diastolic congestive heart failure    Synthroid 175 mcg (0.175 mg) oral tablet  -- 1 tab(s) by mouth once a day  -- Indication: For Hypothyroidism

## 2017-06-30 NOTE — DISCHARGE NOTE ADULT - NS AS DC STROKE ED MATERIALS
Stroke Education Booklet/Risk Factors for Stroke/Call 911 for Stroke/Need for Followup After Discharge/Prescribed Medications/Stroke Warning Signs and Symptoms

## 2017-06-30 NOTE — DISCHARGE NOTE ADULT - VISION (WITH CORRECTIVE LENSES IF THE PATIENT USUALLY WEARS THEM):
reading glasses, has double vision/Partially impaired: cannot see medication labels or newsprint, but can see obstacles in path, and the surrounding layout; can count fingers at arm's length

## 2017-06-30 NOTE — PROVIDER CONTACT NOTE (CHANGE IN STATUS NOTIFICATION) - SITUATION
Pt discharged. All written materials and discharge instructions given by day RN. Son in law came to pick pt up. left unit via Wheelchair at 1940 with all belongings.

## 2017-06-30 NOTE — DISCHARGE NOTE ADULT - MEDICATION SUMMARY - MEDICATIONS TO STOP TAKING
I will STOP taking the medications listed below when I get home from the hospital:    lisinopril 20 mg oral tablet  -- 1 tab(s) by mouth once a day    Crestor 20 mg oral tablet  -- 1 tab(s) by mouth once a day (at bedtime)

## 2017-06-30 NOTE — DISCHARGE NOTE ADULT - HOSPITAL COURSE
66yr old female PMH HTN, systolic & diastolic CHF, pulmonary hypertension, chronic hypercarbic respiratory failure, CVA with left sided weakness, echo 12/16 revealed EF of 30-35%, MORAGN demonstrated mobile atheroma in the aortic arch moderate to severe mitral regurgitation and moderate to severe tricuspid regurgitation. Repeat echo 4/2017 revealed improved EF and improvement of tricuspid regurgitation. Pt. had c/o dyspnea with moderate exertion, she underwent a nuclear stress test which revealed moderate to large sized moderately severe inferior lateral wall ischemia. Pt presented to  on 6/26/17 for elective card cath underwent stenting to RCA/OM1 on 6/27 when code stroke was called for complaints of blurry vision. MRI then revealed tiny acute atroke in Left cerebellum and patient was tranferred to CCU. Patient still complains of intermittent blurry vision. Refuses to go to rehab. Is able to ambulate unassisted > 150 ft. This am has no complaints of dipolopia. INR 1.2 Plan will include lovenox bridge therapy as she stands high risk for recurrent stroke as dicussed with neurology. as this is a small stroke there is no risk for hemorrhagic conversion. She does complain of  intermittent diplopia at times although she feels it is improving daily. Still adamant about returning to home. Daughter has not arranged for any increased home care/supervision. She is aware that she is risk for falls and bleeding given her visual disturbance and family will assume responsibility of her care during the interim. I have reached out to her PCP Dr Lam and made an appointment for this monday July 3rd at 11:30 am for follow up and inr check. She will need to follow up with a vasular specialist as outpt for her 50-70 % R ICA stenosis to re-address candidacy for stenting in the near future. For now must contine DAPT.     ICU Vital Signs Last 24 Hrs  T(C): 36.5 (30 Jun 2017 08:33), Max: 37 (29 Jun 2017 16:00)  T(F): 97.7 (30 Jun 2017 08:33), Max: 98.6 (29 Jun 2017 16:00)  HR: 83 (30 Jun 2017 09:00) (57 - 83)  BP: 117/58 (30 Jun 2017 09:00) (117/58 - 164/62)  BP(mean): 82 (30 Jun 2017 05:44) (79 - 84)  ABP: --  ABP(mean): --  RR: 18 (30 Jun 2017 09:00) (7 - 34)  SpO2: --  PHYSICAL EXAM:    Constitutional: NAD, awake and alert, well-developed  HEENT: PERR, EOMI, Normal Hearing, MMM + nystagmus  Neck: Soft and supple, No LAD, No JVD  Respiratory: Breath sounds are clear bilaterally, No wheezing, rales or rhonchi  Cardiovascular: S1 and S2, regular rate and rhythm, no Murmurs, gallops or rubs  Gastrointestinal: Bowel Sounds present, soft, nontender, nondistended, no guarding, no rebound  Extremities: No peripheral edema  Vascular: 2+ peripheral pulses  Neurological: A/O x 3, no focal deficits, + nystagumus, 5/5 motor throughout no sensory deficits +2 DTRs  Musculoskeletal: 5/5 strength b/l upper and lower extremities  Skin: No rashes          LABS:                        13.7   8.9   )-----------( 321      ( 27 Jun 2017 04:53 )             40.5     06-27    139  |  105  |  13  ----------------------------<  97  4.1   |  28  |  0.58  EXAM:  BRAIN (MRI) W O CON                        PROCEDURE DATE:  06/26/2017    INTERPRETATION:    MR brain  without gadolinium     IMPRESSION:   Tiny acute lacunar infarction within the inferior LEFT   cerebellum. Multiple old lacunar infarctions in the BILATERAL corona   radiata and deep RIGHT white matter and BILATERAL cerebellum. Scattered   periventricular and bifrontal subcortical white matter ischemia is noted.       Assessment/Plan  #Acute cerebellar CVA/h/o old CVA with LT-sided residual weakness  No TPA given current admission (see details in neuro note)  Aspirin, Coumadin- subtx  Carotid doppler: critical stenosis to R ICA/ 50-70% to L ICA. Vascular consult to assess candidacy for stenting - pt aware  D/W Dr mendoza, may start lovenox for bridging, no risk of hemorrhagic conversion as small infarct  Resume coumadin, check INR tomorrow  Home PT, Daughter will need to assume care for patient as patient does not qualify for WILLIS at this juncture as she is ambulating unassisted and without difficulty  she still stands and poses risk of falls in the future as she has intermittent diplopia and may risk bleeding jimenez she is on high dose a/c. Her daughter will need to coordinate care to supervise her mother to prevent these risks.      #CAD s/p stent LCX  Cardio f/u    #Chr syst/diast CHF  Compensated

## 2017-06-30 NOTE — DISCHARGE NOTE ADULT - PATIENT PORTAL LINK FT
“You can access the Follow[facility] Patient Portal, offered by [facility], by registering with the following website: [email or web address]”

## 2017-06-30 NOTE — DISCHARGE NOTE ADULT - PLAN OF CARE
Secondary stroke prevention with lovenox bridge to coumadin. Daughter will assume responsibility to give lovenox injections and have inr checked with PCP Dr Farshad Lam in this monday. If any signs of stroek like symptoms or bleed ing call 911 immediately. Patient requires 24 hour sueprvision given her symptoms of double vision and her likelihood of falls. Her bleeding risk remains high now that she is on blood thinners and must be supervised closely. Daughter has been explained this at length.

## 2017-06-30 NOTE — DISCHARGE NOTE ADULT - CARE PLAN
Principal Discharge DX:	CVA (cerebral vascular accident)  Goal:	Secondary stroke prevention with lovenox bridge to coumadin. Daughter will assume responsibility to give lovenox injections and have inr checked with PCP Dr Farshad Lam in this monday.  Instructions for follow-up, activity and diet:	If any signs of stroek like symptoms or bleed ing call 911 immediately. Patient requires 24 hour sueprvision given her symptoms of double vision and her likelihood of falls. Her bleeding risk remains high now that she is on blood thinners and must be supervised closely. Daughter has been explained this at length.

## 2017-07-05 DIAGNOSIS — I08.3 COMBINED RHEUMATIC DISORDERS OF MITRAL, AORTIC AND TRICUSPID VALVES: ICD-10-CM

## 2017-07-05 DIAGNOSIS — J44.9 CHRONIC OBSTRUCTIVE PULMONARY DISEASE, UNSPECIFIED: ICD-10-CM

## 2017-07-05 DIAGNOSIS — I25.110 ATHEROSCLEROTIC HEART DISEASE OF NATIVE CORONARY ARTERY WITH UNSTABLE ANGINA PECTORIS: ICD-10-CM

## 2017-07-05 DIAGNOSIS — E78.00 PURE HYPERCHOLESTEROLEMIA, UNSPECIFIED: ICD-10-CM

## 2017-07-05 DIAGNOSIS — E66.9 OBESITY, UNSPECIFIED: ICD-10-CM

## 2017-07-05 DIAGNOSIS — I27.2 OTHER SECONDARY PULMONARY HYPERTENSION: ICD-10-CM

## 2017-07-05 DIAGNOSIS — I69.354 HEMIPLEGIA AND HEMIPARESIS FOLLOWING CEREBRAL INFARCTION AFFECTING LEFT NON-DOMINANT SIDE: ICD-10-CM

## 2017-07-05 DIAGNOSIS — J96.12 CHRONIC RESPIRATORY FAILURE WITH HYPERCAPNIA: ICD-10-CM

## 2017-07-05 DIAGNOSIS — I10 ESSENTIAL (PRIMARY) HYPERTENSION: ICD-10-CM

## 2017-07-06 DIAGNOSIS — I63.9 CEREBRAL INFARCTION, UNSPECIFIED: ICD-10-CM

## 2017-07-06 DIAGNOSIS — I65.21 OCCLUSION AND STENOSIS OF RIGHT CAROTID ARTERY: ICD-10-CM

## 2017-07-06 DIAGNOSIS — R53.1 WEAKNESS: ICD-10-CM

## 2017-07-06 DIAGNOSIS — I50.42 CHRONIC COMBINED SYSTOLIC (CONGESTIVE) AND DIASTOLIC (CONGESTIVE) HEART FAILURE: ICD-10-CM

## 2017-07-06 DIAGNOSIS — I70.0 ATHEROSCLEROSIS OF AORTA: ICD-10-CM

## 2017-07-06 DIAGNOSIS — Z95.5 PRESENCE OF CORONARY ANGIOPLASTY IMPLANT AND GRAFT: ICD-10-CM

## 2017-07-06 DIAGNOSIS — H53.2 DIPLOPIA: ICD-10-CM

## 2017-08-04 ENCOUNTER — APPOINTMENT (OUTPATIENT)
Dept: NEUROLOGY | Facility: CLINIC | Age: 67
End: 2017-08-04
Payer: MEDICARE

## 2017-08-04 VITALS
HEIGHT: 67 IN | WEIGHT: 200 LBS | HEART RATE: 80 BPM | BODY MASS INDEX: 31.39 KG/M2 | SYSTOLIC BLOOD PRESSURE: 150 MMHG | DIASTOLIC BLOOD PRESSURE: 90 MMHG

## 2017-08-04 DIAGNOSIS — I65.22 OCCLUSION AND STENOSIS OF LEFT CAROTID ARTERY: ICD-10-CM

## 2017-08-04 DIAGNOSIS — G81.94 HEMIPLEGIA, UNSPECIFIED AFFECTING LEFT NONDOMINANT SIDE: ICD-10-CM

## 2017-08-04 DIAGNOSIS — I63.511 CEREBRAL INFARCTION DUE TO UNSPECIFIED OCCLUSION OR STENOSIS OF RIGHT MIDDLE CEREBRAL ARTERY: ICD-10-CM

## 2017-08-04 DIAGNOSIS — Z86.73 PERSONAL HISTORY OF TRANSIENT ISCHEMIC ATTACK (TIA), AND CEREBRAL INFARCTION W/OUT RESIDUAL DEFICITS: ICD-10-CM

## 2017-08-04 DIAGNOSIS — I63.9 CEREBRAL INFARCTION, UNSPECIFIED: ICD-10-CM

## 2017-08-04 PROCEDURE — 99214 OFFICE O/P EST MOD 30 MIN: CPT

## 2017-08-04 RX ORDER — FUROSEMIDE 20 MG/1
20 TABLET ORAL
Qty: 30 | Refills: 0 | Status: ACTIVE | COMMUNITY
Start: 2017-02-16

## 2017-08-04 RX ORDER — SACUBITRIL AND VALSARTAN 97; 103 MG/1; MG/1
97-103 TABLET, FILM COATED ORAL
Qty: 180 | Refills: 0 | Status: ACTIVE | COMMUNITY
Start: 2017-07-19

## 2017-08-04 RX ORDER — ENOXAPARIN SODIUM 100 MG/ML
100 INJECTION SUBCUTANEOUS
Qty: 20 | Refills: 0 | Status: ACTIVE | COMMUNITY
Start: 2017-06-30

## 2017-08-04 RX ORDER — ATORVASTATIN CALCIUM 80 MG/1
80 TABLET, FILM COATED ORAL
Qty: 30 | Refills: 0 | Status: ACTIVE | COMMUNITY
Start: 2017-06-30

## 2017-10-17 ENCOUNTER — EMERGENCY (EMERGENCY)
Facility: HOSPITAL | Age: 67
LOS: 0 days | Discharge: HOME | End: 2017-10-17
Attending: EMERGENCY MEDICINE | Admitting: EMERGENCY MEDICINE
Payer: MEDICARE

## 2017-10-17 VITALS
TEMPERATURE: 98 F | RESPIRATION RATE: 18 BRPM | SYSTOLIC BLOOD PRESSURE: 141 MMHG | DIASTOLIC BLOOD PRESSURE: 81 MMHG | HEART RATE: 90 BPM | OXYGEN SATURATION: 100 %

## 2017-10-17 VITALS — WEIGHT: 190.04 LBS | HEIGHT: 67 IN

## 2017-10-17 DIAGNOSIS — I34.0 NONRHEUMATIC MITRAL (VALVE) INSUFFICIENCY: ICD-10-CM

## 2017-10-17 DIAGNOSIS — I50.30 UNSPECIFIED DIASTOLIC (CONGESTIVE) HEART FAILURE: ICD-10-CM

## 2017-10-17 DIAGNOSIS — E03.9 HYPOTHYROIDISM, UNSPECIFIED: ICD-10-CM

## 2017-10-17 DIAGNOSIS — E66.9 OBESITY, UNSPECIFIED: ICD-10-CM

## 2017-10-17 DIAGNOSIS — I10 ESSENTIAL (PRIMARY) HYPERTENSION: ICD-10-CM
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
## 2017-10-17 DIAGNOSIS — Z98.891 HISTORY OF UTERINE SCAR FROM PREVIOUS SURGERY: Chronic | ICD-10-CM

## 2017-10-17 DIAGNOSIS — Z79.01 LONG TERM (CURRENT) USE OF ANTICOAGULANTS: ICD-10-CM

## 2017-10-17 DIAGNOSIS — Z95.5 PRESENCE OF CORONARY ANGIOPLASTY IMPLANT AND GRAFT: ICD-10-CM

## 2017-10-17 DIAGNOSIS — R51 HEADACHE: ICD-10-CM
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
## 2017-10-17 DIAGNOSIS — J44.9 CHRONIC OBSTRUCTIVE PULMONARY DISEASE, UNSPECIFIED: ICD-10-CM

## 2017-10-17 DIAGNOSIS — I27.20 PULMONARY HYPERTENSION, UNSPECIFIED: ICD-10-CM

## 2017-10-17 DIAGNOSIS — S00.31XA ABRASION OF NOSE, INITIAL ENCOUNTER: ICD-10-CM

## 2017-10-17 DIAGNOSIS — I25.10 ATHEROSCLEROTIC HEART DISEASE OF NATIVE CORONARY ARTERY WITHOUT ANGINA PECTORIS: ICD-10-CM

## 2017-10-17 DIAGNOSIS — Z86.73 PERSONAL HISTORY OF TRANSIENT ISCHEMIC ATTACK (TIA), AND CEREBRAL INFARCTION WITHOUT RESIDUAL DEFICITS: ICD-10-CM

## 2017-10-17 DIAGNOSIS — R04.0 EPISTAXIS: ICD-10-CM

## 2017-10-17 DIAGNOSIS — I50.20 UNSPECIFIED SYSTOLIC (CONGESTIVE) HEART FAILURE: ICD-10-CM

## 2017-10-17 LAB
APTT BLD: 35.3 SEC — SIGNIFICANT CHANGE UP (ref 27.5–37.4)
BASOPHILS # BLD AUTO: 0 K/UL — SIGNIFICANT CHANGE UP (ref 0–0.2)
BASOPHILS NFR BLD AUTO: 0.6 % — SIGNIFICANT CHANGE UP (ref 0–2)
EOSINOPHIL # BLD AUTO: 0.2 K/UL — SIGNIFICANT CHANGE UP (ref 0–0.5)
EOSINOPHIL NFR BLD AUTO: 2.7 % — SIGNIFICANT CHANGE UP (ref 0–6)
HCT VFR BLD CALC: 40.8 % — SIGNIFICANT CHANGE UP (ref 34.5–45)
HGB BLD-MCNC: 13.4 G/DL — SIGNIFICANT CHANGE UP (ref 11.5–15.5)
INR BLD: 1.56 RATIO — HIGH (ref 0.88–1.16)
LYMPHOCYTES # BLD AUTO: 1.4 K/UL — SIGNIFICANT CHANGE UP (ref 1–3.3)
LYMPHOCYTES # BLD AUTO: 16.2 % — SIGNIFICANT CHANGE UP (ref 13–44)
MCHC RBC-ENTMCNC: 28.4 PG — SIGNIFICANT CHANGE UP (ref 27–34)
MCHC RBC-ENTMCNC: 32.9 GM/DL — SIGNIFICANT CHANGE UP (ref 32–36)
MCV RBC AUTO: 86.5 FL — SIGNIFICANT CHANGE UP (ref 80–100)
MONOCYTES # BLD AUTO: 0.5 K/UL — SIGNIFICANT CHANGE UP (ref 0–0.9)
MONOCYTES NFR BLD AUTO: 6.2 % — SIGNIFICANT CHANGE UP (ref 2–14)
NEUTROPHILS # BLD AUTO: 6.5 K/UL — SIGNIFICANT CHANGE UP (ref 1.8–7.4)
NEUTROPHILS NFR BLD AUTO: 74.3 % — SIGNIFICANT CHANGE UP (ref 43–77)
PLATELET # BLD AUTO: 378 K/UL — SIGNIFICANT CHANGE UP (ref 150–400)
PROTHROM AB SERPL-ACNC: 17 SEC — HIGH (ref 9.8–12.7)
RBC # BLD: 4.72 M/UL — SIGNIFICANT CHANGE UP (ref 3.8–5.2)
RBC # FLD: 12.4 % — SIGNIFICANT CHANGE UP (ref 10.3–14.5)
WBC # BLD: 8.8 K/UL — SIGNIFICANT CHANGE UP (ref 3.8–10.5)
WBC # FLD AUTO: 8.8 K/UL — SIGNIFICANT CHANGE UP (ref 3.8–10.5)

## 2017-10-17 PROCEDURE — 70450 CT HEAD/BRAIN W/O DYE: CPT | Mod: 26

## 2017-10-17 PROCEDURE — 93010 ELECTROCARDIOGRAM REPORT: CPT

## 2017-10-17 PROCEDURE — 99285 EMERGENCY DEPT VISIT HI MDM: CPT | Mod: 25

## 2017-10-17 PROCEDURE — 76377 3D RENDER W/INTRP POSTPROCES: CPT | Mod: 26

## 2017-10-17 PROCEDURE — 72125 CT NECK SPINE W/O DYE: CPT | Mod: 26

## 2017-10-17 PROCEDURE — 70486 CT MAXILLOFACIAL W/O DYE: CPT | Mod: 26

## 2017-10-17 PROCEDURE — 30901 CONTROL OF NOSEBLEED: CPT

## 2017-10-17 RX ORDER — LIDOCAINE 4 G/100G
1 CREAM TOPICAL ONCE
Qty: 0 | Refills: 0 | Status: COMPLETED | OUTPATIENT
Start: 2017-10-17 | End: 2017-10-17

## 2017-10-17 RX ORDER — ACETAMINOPHEN 500 MG
1000 TABLET ORAL ONCE
Qty: 0 | Refills: 0 | Status: COMPLETED | OUTPATIENT
Start: 2017-10-17 | End: 2017-10-17

## 2017-10-17 RX ORDER — OXYMETAZOLINE HYDROCHLORIDE 0.5 MG/ML
2 SPRAY NASAL ONCE
Qty: 0 | Refills: 0 | Status: COMPLETED | OUTPATIENT
Start: 2017-10-17 | End: 2017-10-17

## 2017-10-17 RX ORDER — AMOXICILLIN 250 MG/5ML
1 SUSPENSION, RECONSTITUTED, ORAL (ML) ORAL
Qty: 9 | Refills: 0 | OUTPATIENT
Start: 2017-10-17 | End: 2017-10-20

## 2017-10-17 RX ADMIN — LIDOCAINE 1 APPLICATION(S): 4 CREAM TOPICAL at 16:08

## 2017-10-17 RX ADMIN — Medication 1000 MILLIGRAM(S): at 17:41

## 2017-10-17 RX ADMIN — OXYMETAZOLINE HYDROCHLORIDE 2 SPRAY(S): 0.5 SPRAY NASAL at 16:08

## 2017-10-17 NOTE — ED PROVIDER NOTE - ENMT, MLM
(+) both frontal incisors missing already  (+) blood in both nares.  Airway patent, Nasal mucosa clear. Mouth with normal mucosa. Throat has no vesicles, no oropharyngeal exudates and uvula is midline. (+)ttp nose. (+) both frontal incisors missing already  (+) blood in both nares.  Airway patent, Nasal mucosa clear. Mouth with normal mucosa. Throat has no vesicles, no oropharyngeal exudates and uvula is midline.

## 2017-10-17 NOTE — ED PROVIDER NOTE - MUSCULOSKELETAL, MLM
Spine appears normal, range of motion is not limited, no muscle or joint tenderness Spine appears normal, range of motion is not limited, no muscle or joint tenderness.  No swelling, tenderness, deformities to extremities

## 2017-10-17 NOTE — ED PROVIDER NOTE - OBJECTIVE STATEMENT
68 y/o female with PMHx of COPD, HTN, hypothyroidism, CAD (x3 stents, on Coumadin), CVA, diastolic CHF presents to the ED s/p tripped on curb, fell and landed on her face approx 15 minutes ago.  Pt was ambulatory after fall.  Denies LOC, neck pain, headache.  On Coumadin for stents.  Cardiologist Dr. Conway.  PCP Dr. Veliz.

## 2017-10-17 NOTE — ED ADULT NURSE NOTE - OBJECTIVE STATEMENT
Patient was returning from an MRI in [place]. She wanted to get cold cuts and she got out of the car and caught the curb with her toe and went down.

## 2017-10-17 NOTE — ED PROVIDER NOTE - MEDICAL DECISION MAKING DETAILS
patient observed in the ED for approx 3 hrs post nasal packing. no active bleeding, no bleeding in the posterior oropharynx. patient appropriate for discharge home. precautions when to return to the ED given and understood.

## 2017-10-17 NOTE — ED PROVIDER NOTE - NEUROLOGICAL, MLM
Alert and oriented, no focal deficits, no motor or sensory deficits. Alert and oriented, no focal deficits, no motor or sensory deficits. 5/5 strength in all extremities.

## 2017-10-18 ENCOUNTER — EMERGENCY (EMERGENCY)
Facility: HOSPITAL | Age: 67
LOS: 0 days | Discharge: HOME | End: 2017-10-18
Attending: EMERGENCY MEDICINE | Admitting: EMERGENCY MEDICINE
Payer: MEDICARE

## 2017-10-18 VITALS — WEIGHT: 179.9 LBS

## 2017-10-18 VITALS
DIASTOLIC BLOOD PRESSURE: 106 MMHG | SYSTOLIC BLOOD PRESSURE: 148 MMHG | HEART RATE: 78 BPM | OXYGEN SATURATION: 96 % | RESPIRATION RATE: 18 BRPM | TEMPERATURE: 98 F

## 2017-10-18 DIAGNOSIS — I08.1 RHEUMATIC DISORDERS OF BOTH MITRAL AND TRICUSPID VALVES: ICD-10-CM

## 2017-10-18 DIAGNOSIS — I10 ESSENTIAL (PRIMARY) HYPERTENSION: ICD-10-CM

## 2017-10-18 DIAGNOSIS — Z95.5 PRESENCE OF CORONARY ANGIOPLASTY IMPLANT AND GRAFT: ICD-10-CM

## 2017-10-18 DIAGNOSIS — R04.0 EPISTAXIS: ICD-10-CM

## 2017-10-18 DIAGNOSIS — E03.9 HYPOTHYROIDISM, UNSPECIFIED: ICD-10-CM

## 2017-10-18 DIAGNOSIS — I50.9 HEART FAILURE, UNSPECIFIED: ICD-10-CM

## 2017-10-18 DIAGNOSIS — E66.9 OBESITY, UNSPECIFIED: ICD-10-CM

## 2017-10-18 DIAGNOSIS — Z79.01 LONG TERM (CURRENT) USE OF ANTICOAGULANTS: ICD-10-CM

## 2017-10-18 DIAGNOSIS — Z98.891 HISTORY OF UTERINE SCAR FROM PREVIOUS SURGERY: Chronic | ICD-10-CM

## 2017-10-18 DIAGNOSIS — Z86.73 PERSONAL HISTORY OF TRANSIENT ISCHEMIC ATTACK (TIA), AND CEREBRAL INFARCTION WITHOUT RESIDUAL DEFICITS: ICD-10-CM

## 2017-10-18 DIAGNOSIS — J44.9 CHRONIC OBSTRUCTIVE PULMONARY DISEASE, UNSPECIFIED: ICD-10-CM

## 2017-10-18 DIAGNOSIS — I27.20 PULMONARY HYPERTENSION, UNSPECIFIED: ICD-10-CM

## 2017-10-18 PROCEDURE — 99282 EMERGENCY DEPT VISIT SF MDM: CPT

## 2017-10-18 NOTE — ED ADULT TRIAGE NOTE - CHIEF COMPLAINT QUOTE
Pt presents ED for removal of rhino rockets. Pt was seen in ED yesterday and instructed to f/u with ENT for removal today but no one takes her insurance so she returned to the ED

## 2017-10-18 NOTE — ED STATDOCS - ATTENDING CONTRIBUTION TO CARE
I, Froilan Murguia MD,  performed the initial face to face bedside interview with this patient regarding history of present illness, review of symptoms and relevant past medical, social and family history.  I completed an independent physical examination.  I was the initial provider who evaluated this patient. I have signed out the follow up of any pending tests (i.e. labs, radiological studies) to the acp.  I have communicated the patient’s plan of care and disposition with the acp.  The history, relevant review of systems, past medical and surgical history, medical decision making, and physical examination was documented by the scribe in my presence and I attest to the accuracy of the documentation.

## 2017-10-18 NOTE — ED STATDOCS - OBJECTIVE STATEMENT
68 y/o F w/ pmhx of cardiac stents presents to ED c/o epistaxis. Pt was here yesterday for epistaxis, presents today for continued bleeding. Pt presents for removal of rhino rockets. Pt states she was seen here yesterday and instructed to follow up with ENT. Pt states she couldn't get ENT appointment until 10/23. Currently on Warfarin. Pt hasn't taken any of her medications today.

## 2017-10-18 NOTE — ED STATDOCS - PROGRESS NOTE DETAILS
YEVGENIY Kilgore:   Patient has been seen, evaluated and orders have been written by the attending in intake. Patient is stable.  I will follow up the results of orders written and I will continue to evaluate/observe the patient.  Pt. unable to secure follow up with ENT until the 23rd.  Pt. instructed to come back to ED in 4 days.  INR 1.7 and will continue Coumadin.  Moraima Kilgore PA-C

## 2017-10-18 NOTE — ED STATDOCS - ENMT, MLM
old dried blood in bilateral nares with rhino rocket in right nostril. Mouth with normal mucosa  Throat has no vesicles, no oropharyngeal exudates and uvula is midline.

## 2017-11-02 NOTE — PATIENT PROFILE ADULT. - NS TRANSFER PATIENT BELONGINGS
[id number]  [id number]   [facility] Jewelry/Money (specify)/Clothing/Cell Phone/PDA (specify)

## 2017-11-12 ENCOUNTER — INPATIENT (INPATIENT)
Facility: HOSPITAL | Age: 67
LOS: 5 days | End: 2017-11-18
Attending: SURGERY | Admitting: SURGERY
Payer: MEDICARE

## 2017-11-12 VITALS
HEART RATE: 104 BPM | SYSTOLIC BLOOD PRESSURE: 186 MMHG | HEIGHT: 70 IN | WEIGHT: 199.96 LBS | RESPIRATION RATE: 6 BRPM | OXYGEN SATURATION: 75 % | DIASTOLIC BLOOD PRESSURE: 167 MMHG

## 2017-11-12 DIAGNOSIS — K85.90 ACUTE PANCREATITIS WITHOUT NECROSIS OR INFECTION, UNSPECIFIED: ICD-10-CM

## 2017-11-12 DIAGNOSIS — N17.9 ACUTE KIDNEY FAILURE, UNSPECIFIED: ICD-10-CM

## 2017-11-12 DIAGNOSIS — J96.20 ACUTE AND CHRONIC RESPIRATORY FAILURE, UNSPECIFIED WHETHER WITH HYPOXIA OR HYPERCAPNIA: ICD-10-CM

## 2017-11-12 DIAGNOSIS — E66.9 OBESITY, UNSPECIFIED: ICD-10-CM

## 2017-11-12 DIAGNOSIS — Z98.891 HISTORY OF UTERINE SCAR FROM PREVIOUS SURGERY: Chronic | ICD-10-CM

## 2017-11-12 DIAGNOSIS — E86.0 DEHYDRATION: ICD-10-CM

## 2017-11-12 DIAGNOSIS — G93.41 METABOLIC ENCEPHALOPATHY: ICD-10-CM

## 2017-11-12 DIAGNOSIS — R41.89 OTHER SYMPTOMS AND SIGNS INVOLVING COGNITIVE FUNCTIONS AND AWARENESS: ICD-10-CM

## 2017-11-12 LAB
ALBUMIN SERPL ELPH-MCNC: 2.2 G/DL — LOW (ref 3.3–5)
ALP SERPL-CCNC: 236 U/L — HIGH (ref 40–120)
ALT FLD-CCNC: 248 U/L — HIGH (ref 12–78)
ANION GAP SERPL CALC-SCNC: 18 MMOL/L — HIGH (ref 5–17)
ANISOCYTOSIS BLD QL: SIGNIFICANT CHANGE UP
APPEARANCE UR: (no result)
APTT BLD: 38.6 SEC — HIGH (ref 27.5–37.4)
AST SERPL-CCNC: 280 U/L — HIGH (ref 15–37)
BACTERIA # UR AUTO: (no result)
BASE EXCESS BLDA CALC-SCNC: -14 MMOL/L — LOW (ref -2–2)
BILIRUB SERPL-MCNC: 1.9 MG/DL — HIGH (ref 0.2–1.2)
BILIRUB UR-MCNC: (no result)
BLD GP AB SCN SERPL QL: SIGNIFICANT CHANGE UP
BLOOD GAS COMMENTS ARTERIAL: SIGNIFICANT CHANGE UP
BUN SERPL-MCNC: 43 MG/DL — HIGH (ref 7–23)
BURR CELLS BLD QL SMEAR: PRESENT — SIGNIFICANT CHANGE UP
CALCIUM SERPL-MCNC: 6.8 MG/DL — LOW (ref 8.5–10.1)
CHLORIDE SERPL-SCNC: 111 MMOL/L — HIGH (ref 96–108)
CK SERPL-CCNC: 231 U/L — HIGH (ref 26–192)
CK SERPL-CCNC: 335 U/L — HIGH (ref 26–192)
CO2 SERPL-SCNC: 14 MMOL/L — LOW (ref 22–31)
COLOR SPEC: (no result)
CREAT SERPL-MCNC: 2.63 MG/DL — HIGH (ref 0.5–1.3)
DIFF PNL FLD: (no result)
ELLIPTOCYTES BLD QL SMEAR: SLIGHT — SIGNIFICANT CHANGE UP
EPI CELLS # UR: SIGNIFICANT CHANGE UP
ETHANOL SERPL-MCNC: <10 MG/DL — SIGNIFICANT CHANGE UP (ref 0–10)
GLUCOSE SERPL-MCNC: 113 MG/DL — HIGH (ref 70–99)
GLUCOSE UR QL: NEGATIVE MG/DL — SIGNIFICANT CHANGE UP
HCO3 BLDA-SCNC: 13 MMOL/L — LOW (ref 21–29)
HCT VFR BLD CALC: 36 % — SIGNIFICANT CHANGE UP (ref 34.5–45)
HCT VFR BLD CALC: 50.5 % — HIGH (ref 34.5–45)
HGB BLD-MCNC: 11 G/DL — LOW (ref 11.5–15.5)
HGB BLD-MCNC: 15.7 G/DL — HIGH (ref 11.5–15.5)
HOROWITZ INDEX BLDA+IHG-RTO: 50 — SIGNIFICANT CHANGE UP
HYPERCHROMIA BLD QL AUTO: SLIGHT — SIGNIFICANT CHANGE UP
HYPOSEGMENTATION: PRESENT — SIGNIFICANT CHANGE UP
INR BLD: 5 RATIO — CRITICAL HIGH (ref 0.88–1.16)
KETONES UR-MCNC: (no result)
LACTATE SERPL-SCNC: 2.2 MMOL/L — HIGH (ref 0.7–2)
LACTATE SERPL-SCNC: 5.8 MMOL/L — CRITICAL HIGH (ref 0.7–2)
LACTATE SERPL-SCNC: 8.4 MMOL/L — CRITICAL HIGH (ref 0.7–2)
LEUKOCYTE ESTERASE UR-ACNC: (no result)
LIDOCAIN IGE QN: HIGH U/L (ref 73–393)
LYMPHOCYTES # BLD AUTO: 2.2 K/UL — SIGNIFICANT CHANGE UP (ref 1–3.3)
LYMPHOCYTES # BLD AUTO: 5 % — LOW (ref 13–44)
MACROCYTES BLD QL: SLIGHT — SIGNIFICANT CHANGE UP
MANUAL DIF COMMENT BLD-IMP: SIGNIFICANT CHANGE UP
MCHC RBC-ENTMCNC: 27.3 PG — SIGNIFICANT CHANGE UP (ref 27–34)
MCHC RBC-ENTMCNC: 27.9 PG — SIGNIFICANT CHANGE UP (ref 27–34)
MCHC RBC-ENTMCNC: 30.7 GM/DL — LOW (ref 32–36)
MCHC RBC-ENTMCNC: 31.1 GM/DL — LOW (ref 32–36)
MCV RBC AUTO: 89 FL — SIGNIFICANT CHANGE UP (ref 80–100)
MCV RBC AUTO: 89.8 FL — SIGNIFICANT CHANGE UP (ref 80–100)
MICROCYTES BLD QL: SLIGHT — SIGNIFICANT CHANGE UP
MONOCYTES # BLD AUTO: 0.9 K/UL — SIGNIFICANT CHANGE UP (ref 0–0.9)
MONOCYTES NFR BLD AUTO: 2 % — SIGNIFICANT CHANGE UP (ref 2–14)
NEUTROPHILS # BLD AUTO: 18.3 K/UL — HIGH (ref 1.8–7.4)
NEUTROPHILS NFR BLD AUTO: 67 % — SIGNIFICANT CHANGE UP (ref 43–77)
NEUTS BAND # BLD: 26 % — HIGH (ref 0–8)
NITRITE UR-MCNC: POSITIVE
OVALOCYTES BLD QL SMEAR: SLIGHT — SIGNIFICANT CHANGE UP
PCO2 BLDA: 34 MMHG — SIGNIFICANT CHANGE UP (ref 32–46)
PH BLDA: 7.21 — LOW (ref 7.35–7.45)
PH UR: 5 — SIGNIFICANT CHANGE UP (ref 5–8)
PLAT MORPH BLD: NORMAL — SIGNIFICANT CHANGE UP
PLATELET # BLD AUTO: 392 K/UL — SIGNIFICANT CHANGE UP (ref 150–400)
PLATELET # BLD AUTO: 517 K/UL — HIGH (ref 150–400)
PO2 BLDA: 148 — SIGNIFICANT CHANGE UP
POIKILOCYTOSIS BLD QL AUTO: SIGNIFICANT CHANGE UP
POLYCHROMASIA BLD QL SMEAR: SLIGHT — SIGNIFICANT CHANGE UP
POTASSIUM SERPL-MCNC: 4.2 MMOL/L — SIGNIFICANT CHANGE UP (ref 3.5–5.3)
POTASSIUM SERPL-SCNC: 4.2 MMOL/L — SIGNIFICANT CHANGE UP (ref 3.5–5.3)
PROT SERPL-MCNC: 5.4 GM/DL — LOW (ref 6–8.3)
PROT UR-MCNC: 100 MG/DL
PROTHROM AB SERPL-ACNC: 55.8 SEC — HIGH (ref 9.8–12.7)
RBC # BLD: 4.04 M/UL — SIGNIFICANT CHANGE UP (ref 3.8–5.2)
RBC # BLD: 5.62 M/UL — HIGH (ref 3.8–5.2)
RBC # FLD: 13.7 % — SIGNIFICANT CHANGE UP (ref 10.3–14.5)
RBC # FLD: 14.1 % — SIGNIFICANT CHANGE UP (ref 10.3–14.5)
RBC BLD AUTO: (no result)
RBC CASTS # UR COMP ASSIST: (no result) /HPF (ref 0–4)
SAO2 % BLDA: 98 — SIGNIFICANT CHANGE UP
SCHISTOCYTES BLD QL AUTO: SLIGHT — SIGNIFICANT CHANGE UP
SODIUM SERPL-SCNC: 143 MMOL/L — SIGNIFICANT CHANGE UP (ref 135–145)
SP GR SPEC: 1.02 — SIGNIFICANT CHANGE UP (ref 1.01–1.02)
TROPONIN I SERPL-MCNC: 0.09 NG/ML — HIGH (ref 0.01–0.04)
TROPONIN I SERPL-MCNC: 0.16 NG/ML — HIGH (ref 0.01–0.04)
TYPE + AB SCN PNL BLD: SIGNIFICANT CHANGE UP
UROBILINOGEN FLD QL: 4 MG/DL
WBC # BLD: 13.3 K/UL — HIGH (ref 3.8–10.5)
WBC # BLD: 21.5 K/UL — HIGH (ref 3.8–10.5)
WBC # FLD AUTO: 13.3 K/UL — HIGH (ref 3.8–10.5)
WBC # FLD AUTO: 21.5 K/UL — HIGH (ref 3.8–10.5)
WBC UR QL: SIGNIFICANT CHANGE UP

## 2017-11-12 PROCEDURE — 99291 CRITICAL CARE FIRST HOUR: CPT

## 2017-11-12 PROCEDURE — 93010 ELECTROCARDIOGRAM REPORT: CPT

## 2017-11-12 PROCEDURE — 70450 CT HEAD/BRAIN W/O DYE: CPT | Mod: 26

## 2017-11-12 PROCEDURE — 74176 CT ABD & PELVIS W/O CONTRAST: CPT | Mod: 26

## 2017-11-12 PROCEDURE — 71010: CPT | Mod: 26

## 2017-11-12 PROCEDURE — 71250 CT THORAX DX C-: CPT | Mod: 26

## 2017-11-12 RX ORDER — SODIUM CHLORIDE 9 MG/ML
2000 INJECTION INTRAMUSCULAR; INTRAVENOUS; SUBCUTANEOUS ONCE
Qty: 0 | Refills: 0 | Status: COMPLETED | OUTPATIENT
Start: 2017-11-12 | End: 2017-11-12

## 2017-11-12 RX ORDER — PIPERACILLIN AND TAZOBACTAM 4; .5 G/20ML; G/20ML
3.38 INJECTION, POWDER, LYOPHILIZED, FOR SOLUTION INTRAVENOUS ONCE
Qty: 0 | Refills: 0 | Status: COMPLETED | OUTPATIENT
Start: 2017-11-12 | End: 2017-11-12

## 2017-11-12 RX ORDER — FUROSEMIDE 40 MG
1 TABLET ORAL
Qty: 0 | Refills: 0 | COMMUNITY

## 2017-11-12 RX ORDER — SODIUM CHLORIDE 9 MG/ML
3 INJECTION INTRAMUSCULAR; INTRAVENOUS; SUBCUTANEOUS ONCE
Qty: 0 | Refills: 0 | Status: DISCONTINUED | OUTPATIENT
Start: 2017-11-12 | End: 2017-11-12

## 2017-11-12 RX ORDER — ASPIRIN/CALCIUM CARB/MAGNESIUM 324 MG
81 TABLET ORAL DAILY
Qty: 0 | Refills: 0 | Status: DISCONTINUED | OUTPATIENT
Start: 2017-11-12 | End: 2017-11-12

## 2017-11-12 RX ORDER — ACETAMINOPHEN 500 MG
650 TABLET ORAL EVERY 6 HOURS
Qty: 0 | Refills: 0 | Status: DISCONTINUED | OUTPATIENT
Start: 2017-11-12 | End: 2017-11-18

## 2017-11-12 RX ORDER — PANTOPRAZOLE SODIUM 20 MG/1
40 TABLET, DELAYED RELEASE ORAL DAILY
Qty: 0 | Refills: 0 | Status: DISCONTINUED | OUTPATIENT
Start: 2017-11-12 | End: 2017-11-18

## 2017-11-12 RX ORDER — SUCCINYLCHOLINE CHLORIDE 100 MG/5ML
100 SYRINGE (ML) INTRAVENOUS ONCE
Qty: 0 | Refills: 0 | Status: COMPLETED | OUTPATIENT
Start: 2017-11-12 | End: 2017-11-12

## 2017-11-12 RX ORDER — SACUBITRIL AND VALSARTAN 24; 26 MG/1; MG/1
1 TABLET, FILM COATED ORAL
Qty: 0 | Refills: 0 | Status: DISCONTINUED | OUTPATIENT
Start: 2017-11-12 | End: 2017-11-13

## 2017-11-12 RX ORDER — CARVEDILOL PHOSPHATE 80 MG/1
6.25 CAPSULE, EXTENDED RELEASE ORAL EVERY 12 HOURS
Qty: 0 | Refills: 0 | Status: DISCONTINUED | OUTPATIENT
Start: 2017-11-12 | End: 2017-11-13

## 2017-11-12 RX ORDER — LEVOTHYROXINE SODIUM 125 MCG
175 TABLET ORAL DAILY
Qty: 0 | Refills: 0 | Status: DISCONTINUED | OUTPATIENT
Start: 2017-11-12 | End: 2017-11-16

## 2017-11-12 RX ORDER — CARVEDILOL PHOSPHATE 80 MG/1
6.25 CAPSULE, EXTENDED RELEASE ORAL
Qty: 0 | Refills: 0 | Status: DISCONTINUED | OUTPATIENT
Start: 2017-11-12 | End: 2017-11-12

## 2017-11-12 RX ORDER — PIPERACILLIN AND TAZOBACTAM 4; .5 G/20ML; G/20ML
3.38 INJECTION, POWDER, LYOPHILIZED, FOR SOLUTION INTRAVENOUS EVERY 12 HOURS
Qty: 0 | Refills: 0 | Status: DISCONTINUED | OUTPATIENT
Start: 2017-11-12 | End: 2017-11-13

## 2017-11-12 RX ORDER — SODIUM BICARBONATE 1 MEQ/ML
0.12 SYRINGE (ML) INTRAVENOUS
Qty: 150 | Refills: 0 | Status: DISCONTINUED | OUTPATIENT
Start: 2017-11-12 | End: 2017-11-13

## 2017-11-12 RX ORDER — PHENYLEPHRINE HYDROCHLORIDE 10 MG/ML
0.5 INJECTION INTRAVENOUS
Qty: 80 | Refills: 0 | Status: DISCONTINUED | OUTPATIENT
Start: 2017-11-12 | End: 2017-11-14

## 2017-11-12 RX ORDER — PROPOFOL 10 MG/ML
5 INJECTION, EMULSION INTRAVENOUS
Qty: 500 | Refills: 0 | Status: DISCONTINUED | OUTPATIENT
Start: 2017-11-12 | End: 2017-11-13

## 2017-11-12 RX ORDER — ASPIRIN/CALCIUM CARB/MAGNESIUM 324 MG
81 TABLET ORAL DAILY
Qty: 0 | Refills: 0 | Status: DISCONTINUED | OUTPATIENT
Start: 2017-11-12 | End: 2017-11-13

## 2017-11-12 RX ORDER — SODIUM CHLORIDE 9 MG/ML
1000 INJECTION INTRAMUSCULAR; INTRAVENOUS; SUBCUTANEOUS
Qty: 0 | Refills: 0 | Status: DISCONTINUED | OUTPATIENT
Start: 2017-11-12 | End: 2017-11-13

## 2017-11-12 RX ADMIN — PHENYLEPHRINE HYDROCHLORIDE 17.01 MICROGRAM(S)/KG/MIN: 10 INJECTION INTRAVENOUS at 21:29

## 2017-11-12 RX ADMIN — SODIUM CHLORIDE 125 MILLILITER(S): 9 INJECTION INTRAMUSCULAR; INTRAVENOUS; SUBCUTANEOUS at 21:29

## 2017-11-12 RX ADMIN — PROPOFOL 2.72 MICROGRAM(S)/KG/MIN: 10 INJECTION, EMULSION INTRAVENOUS at 21:31

## 2017-11-12 RX ADMIN — SODIUM CHLORIDE 2000 MILLILITER(S): 9 INJECTION INTRAMUSCULAR; INTRAVENOUS; SUBCUTANEOUS at 16:04

## 2017-11-12 RX ADMIN — SODIUM CHLORIDE 1000 MILLILITER(S): 9 INJECTION INTRAMUSCULAR; INTRAVENOUS; SUBCUTANEOUS at 17:45

## 2017-11-12 RX ADMIN — SODIUM CHLORIDE 1000 MILLILITER(S): 9 INJECTION INTRAMUSCULAR; INTRAVENOUS; SUBCUTANEOUS at 21:29

## 2017-11-12 RX ADMIN — PROPOFOL 2.72 MICROGRAM(S)/KG/MIN: 10 INJECTION, EMULSION INTRAVENOUS at 19:32

## 2017-11-12 RX ADMIN — PIPERACILLIN AND TAZOBACTAM 200 GRAM(S): 4; .5 INJECTION, POWDER, LYOPHILIZED, FOR SOLUTION INTRAVENOUS at 17:15

## 2017-11-12 RX ADMIN — Medication 2 MILLIGRAM(S): at 16:45

## 2017-11-12 RX ADMIN — Medication 100 MILLIGRAM(S): at 16:04

## 2017-11-12 RX ADMIN — SODIUM CHLORIDE 2000 MILLILITER(S): 9 INJECTION INTRAMUSCULAR; INTRAVENOUS; SUBCUTANEOUS at 23:18

## 2017-11-12 RX ADMIN — PIPERACILLIN AND TAZOBACTAM 200 GRAM(S): 4; .5 INJECTION, POWDER, LYOPHILIZED, FOR SOLUTION INTRAVENOUS at 21:43

## 2017-11-12 RX ADMIN — Medication 1 MILLIGRAM(S): at 21:30

## 2017-11-12 NOTE — ED PROVIDER NOTE - CRITICAL CARE PROVIDED
direct patient care (not related to procedure)/documentation/consult w/ pt's family directly relating to pts condition/consultation with other physicians/additional history taking/interpretation of diagnostic studies

## 2017-11-12 NOTE — ED ADULT TRIAGE NOTE - NS ED NOTE AC HIGH RISK COUNTRIES
Problem: Goal Outcome Summary  Goal: Goal Outcome Summary  Outcome: No Change  VSS, bowels active, +flatus, BM this jason, adequate urine in [place], lungs dim, CT small airleak with cough, no crepitus, up to bedside commode with 1 assist       No

## 2017-11-12 NOTE — PROCEDURE NOTE - NSINFORMCONSENT_GEN_A_CORE
proxy- daughter/Benefits, risks, and possible complications of procedure explained to patient/caregiver who verbalized understanding and gave written consent.

## 2017-11-12 NOTE — H&P ADULT - PROBLEM SELECTOR PROBLEM 2
Class 3 obesity in adult, unspecified BMI, unspecified obesity type, unspecified whether serious comorbidity present

## 2017-11-12 NOTE — H&P ADULT - HISTORY OF PRESENT ILLNESS
· Chief Complaint: The patient is a 67y Female complaining of unresponsive.	  · Unable to Obtain: Unresponsive	  · Details: Unresponsive	  · HPI Objective Statement: 68 y/o F PMHx COPD, CVA, HTN, Hypothyroid, pulm htn, CHF, on coumadin, asa, presents to the ED s/p unresponsive. The pt was BIBA after being found unresponsive in her bathroom while having a bowel movement. Further history unavailable as pt is unresponsive.	    PAST MEDICAL/SURGICAL/FAMILY/SOCIAL HISTORY:    Past Medical History:  Atheroma    COPD (chronic obstructive pulmonary disease)    CVA (cerebral vascular accident)    Diastolic congestive heart failure    HTN (hypertension)    Hypothyroidism    Moderate to severe mitral regurgitation    Obesity    Pulmonary hypertension    Severe tricuspid regurgitation    Systolic heart failure.    ICU Vital Signs Last 24 Hrs  T(C): 37.2 (12 Nov 2017 18:30), Max: 37.9 (12 Nov 2017 16:45)  T(F): 98.9 (12 Nov 2017 18:30), Max: 100.2 (12 Nov 2017 16:45)  HR: 77 (12 Nov 2017 18:30) (77 - 115)  BP: 104/68 (12 Nov 2017 18:30) (66/51 - 186/167)  BP(mean): --  ABP: --  ABP(mean): --  RR: 16 (12 Nov 2017 18:30) (6 - 19)  SpO2: 100% (12 Nov 2017 18:30) (75% - 100%)

## 2017-11-12 NOTE — ED ADULT NURSE NOTE - OBJECTIVE STATEMENT
pt witnessed syncope while having BM @ home, as per EMS, pt unresponsive and mottled on arrival, Large BM noted

## 2017-11-12 NOTE — PROCEDURE NOTE - PROCEDURE
<<-----Click on this checkbox to enter Procedure Central line placement  11/12/2017    Active  JEANNETTEVIN

## 2017-11-12 NOTE — ED PROVIDER NOTE - OBJECTIVE STATEMENT
68 y/o F presents to the ED s/p unresponsive. The pt was BIBA after being found unresponsive in her bathroom while having a bowel movement. Further history unavailable as pt is unresponsive. 68 y/o F PMHx COPD, CVA, HTN, Hypothyroid, pulm htn, CHF, on coumadin, asa, presents to the ED s/p unresponsive. The pt was BIBA after being found unresponsive in her bathroom while having a bowel movement. Further history unavailable as pt is unresponsive.

## 2017-11-12 NOTE — PROCEDURE NOTE - NSPROCDETAILS_GEN_ALL_CORE
guidewire recovered/lumen(s) aspirated and flushed/sterile dressing applied/sterile technique, catheter placed/ultrasound guidance

## 2017-11-13 LAB
ALBUMIN SERPL ELPH-MCNC: 1.7 G/DL — LOW (ref 3.3–5)
ALP SERPL-CCNC: 197 U/L — HIGH (ref 40–120)
ALT FLD-CCNC: 1623 U/L — HIGH (ref 12–78)
ANION GAP SERPL CALC-SCNC: 11 MMOL/L — SIGNIFICANT CHANGE UP (ref 5–17)
ANION GAP SERPL CALC-SCNC: 14 MMOL/L — SIGNIFICANT CHANGE UP (ref 5–17)
ANION GAP SERPL CALC-SCNC: 15 MMOL/L — SIGNIFICANT CHANGE UP (ref 5–17)
AST SERPL-CCNC: 2209 U/L — HIGH (ref 15–37)
BILIRUB SERPL-MCNC: 2.3 MG/DL — HIGH (ref 0.2–1.2)
BUN SERPL-MCNC: 47 MG/DL — HIGH (ref 7–23)
BUN SERPL-MCNC: 51 MG/DL — HIGH (ref 7–23)
BUN SERPL-MCNC: 58 MG/DL — HIGH (ref 7–23)
CALCIUM SERPL-MCNC: 5.5 MG/DL — CRITICAL LOW (ref 8.5–10.1)
CALCIUM SERPL-MCNC: 5.6 MG/DL — CRITICAL LOW (ref 8.5–10.1)
CALCIUM SERPL-MCNC: 5.8 MG/DL — CRITICAL LOW (ref 8.5–10.1)
CHLORIDE SERPL-SCNC: 115 MMOL/L — HIGH (ref 96–108)
CHLORIDE SERPL-SCNC: 116 MMOL/L — HIGH (ref 96–108)
CHLORIDE SERPL-SCNC: 116 MMOL/L — HIGH (ref 96–108)
CO2 SERPL-SCNC: 14 MMOL/L — LOW (ref 22–31)
CO2 SERPL-SCNC: 15 MMOL/L — LOW (ref 22–31)
CO2 SERPL-SCNC: 17 MMOL/L — LOW (ref 22–31)
CREAT SERPL-MCNC: 2.31 MG/DL — HIGH (ref 0.5–1.3)
CREAT SERPL-MCNC: 3.03 MG/DL — HIGH (ref 0.5–1.3)
CREAT SERPL-MCNC: 3.81 MG/DL — HIGH (ref 0.5–1.3)
GLUCOSE SERPL-MCNC: 66 MG/DL — LOW (ref 70–99)
GLUCOSE SERPL-MCNC: 73 MG/DL — SIGNIFICANT CHANGE UP (ref 70–99)
GLUCOSE SERPL-MCNC: 77 MG/DL — SIGNIFICANT CHANGE UP (ref 70–99)
HCT VFR BLD CALC: 36.8 % — SIGNIFICANT CHANGE UP (ref 34.5–45)
HGB BLD-MCNC: 11.9 G/DL — SIGNIFICANT CHANGE UP (ref 11.5–15.5)
INR BLD: 7.06 RATIO — CRITICAL HIGH (ref 0.88–1.16)
LACTATE SERPL-SCNC: 3.2 MMOL/L — HIGH (ref 0.7–2)
LIDOCAIN IGE QN: HIGH U/L (ref 73–393)
MAGNESIUM SERPL-MCNC: 1.4 MG/DL — LOW (ref 1.6–2.6)
MCHC RBC-ENTMCNC: 29 PG — SIGNIFICANT CHANGE UP (ref 27–34)
MCHC RBC-ENTMCNC: 32.3 GM/DL — SIGNIFICANT CHANGE UP (ref 32–36)
MCV RBC AUTO: 89.7 FL — SIGNIFICANT CHANGE UP (ref 80–100)
PHOSPHATE SERPL-MCNC: 3.5 MG/DL — SIGNIFICANT CHANGE UP (ref 2.5–4.5)
PLATELET # BLD AUTO: 405 K/UL — HIGH (ref 150–400)
POTASSIUM SERPL-MCNC: 4 MMOL/L — SIGNIFICANT CHANGE UP (ref 3.5–5.3)
POTASSIUM SERPL-MCNC: 4.4 MMOL/L — SIGNIFICANT CHANGE UP (ref 3.5–5.3)
POTASSIUM SERPL-MCNC: 4.4 MMOL/L — SIGNIFICANT CHANGE UP (ref 3.5–5.3)
POTASSIUM SERPL-SCNC: 4 MMOL/L — SIGNIFICANT CHANGE UP (ref 3.5–5.3)
POTASSIUM SERPL-SCNC: 4.4 MMOL/L — SIGNIFICANT CHANGE UP (ref 3.5–5.3)
POTASSIUM SERPL-SCNC: 4.4 MMOL/L — SIGNIFICANT CHANGE UP (ref 3.5–5.3)
PROT SERPL-MCNC: 4.8 GM/DL — LOW (ref 6–8.3)
PROTHROM AB SERPL-ACNC: 79.3 SEC — HIGH (ref 9.8–12.7)
RBC # BLD: 4.1 M/UL — SIGNIFICANT CHANGE UP (ref 3.8–5.2)
RBC # FLD: 14.3 % — SIGNIFICANT CHANGE UP (ref 10.3–14.5)
SODIUM SERPL-SCNC: 144 MMOL/L — SIGNIFICANT CHANGE UP (ref 135–145)
SODIUM SERPL-SCNC: 144 MMOL/L — SIGNIFICANT CHANGE UP (ref 135–145)
SODIUM SERPL-SCNC: 145 MMOL/L — SIGNIFICANT CHANGE UP (ref 135–145)
TROPONIN I SERPL-MCNC: 0.25 NG/ML — HIGH (ref 0.01–0.04)
WBC # BLD: 14.4 K/UL — HIGH (ref 3.8–10.5)
WBC # FLD AUTO: 14.4 K/UL — HIGH (ref 3.8–10.5)

## 2017-11-13 RX ORDER — ASPIRIN/CALCIUM CARB/MAGNESIUM 324 MG
81 TABLET ORAL DAILY
Qty: 0 | Refills: 0 | Status: DISCONTINUED | OUTPATIENT
Start: 2017-11-13 | End: 2017-11-18

## 2017-11-13 RX ORDER — ASPIRIN/CALCIUM CARB/MAGNESIUM 324 MG
1 TABLET ORAL
Qty: 0 | Refills: 0 | COMMUNITY

## 2017-11-13 RX ORDER — MAGNESIUM SULFATE 500 MG/ML
1 VIAL (ML) INJECTION ONCE
Qty: 0 | Refills: 0 | Status: COMPLETED | OUTPATIENT
Start: 2017-11-13 | End: 2017-11-13

## 2017-11-13 RX ORDER — SODIUM CHLORIDE 9 MG/ML
1000 INJECTION, SOLUTION INTRAVENOUS
Qty: 0 | Refills: 0 | Status: DISCONTINUED | OUTPATIENT
Start: 2017-11-13 | End: 2017-11-15

## 2017-11-13 RX ORDER — MEROPENEM 1 G/30ML
500 INJECTION INTRAVENOUS EVERY 12 HOURS
Qty: 0 | Refills: 0 | Status: DISCONTINUED | OUTPATIENT
Start: 2017-11-13 | End: 2017-11-15

## 2017-11-13 RX ORDER — INFLUENZA VIRUS VACCINE 15; 15; 15; 15 UG/.5ML; UG/.5ML; UG/.5ML; UG/.5ML
0.5 SUSPENSION INTRAMUSCULAR ONCE
Qty: 0 | Refills: 0 | Status: DISCONTINUED | OUTPATIENT
Start: 2017-11-13 | End: 2017-11-18

## 2017-11-13 RX ORDER — PHYTONADIONE (VIT K1) 5 MG
2.5 TABLET ORAL ONCE
Qty: 0 | Refills: 0 | Status: COMPLETED | OUTPATIENT
Start: 2017-11-13 | End: 2017-11-13

## 2017-11-13 RX ORDER — CLOPIDOGREL BISULFATE 75 MG/1
1 TABLET, FILM COATED ORAL
Qty: 0 | Refills: 0 | COMMUNITY

## 2017-11-13 RX ORDER — MEROPENEM 1 G/30ML
250 INJECTION INTRAVENOUS EVERY 12 HOURS
Qty: 0 | Refills: 0 | Status: DISCONTINUED | OUTPATIENT
Start: 2017-11-13 | End: 2017-11-13

## 2017-11-13 RX ORDER — CALCIUM GLUCONATE 100 MG/ML
1 VIAL (ML) INTRAVENOUS ONCE
Qty: 0 | Refills: 0 | Status: COMPLETED | OUTPATIENT
Start: 2017-11-13 | End: 2017-11-13

## 2017-11-13 RX ORDER — ATORVASTATIN CALCIUM 80 MG/1
1 TABLET, FILM COATED ORAL
Qty: 0 | Refills: 0 | COMMUNITY

## 2017-11-13 RX ORDER — SODIUM CHLORIDE 9 MG/ML
1000 INJECTION INTRAMUSCULAR; INTRAVENOUS; SUBCUTANEOUS ONCE
Qty: 0 | Refills: 0 | Status: COMPLETED | OUTPATIENT
Start: 2017-11-13 | End: 2017-11-13

## 2017-11-13 RX ORDER — PROPOFOL 10 MG/ML
5 INJECTION, EMULSION INTRAVENOUS
Qty: 1000 | Refills: 0 | Status: DISCONTINUED | OUTPATIENT
Start: 2017-11-13 | End: 2017-11-17

## 2017-11-13 RX ORDER — NOREPINEPHRINE BITARTRATE/D5W 8 MG/250ML
0.01 PLASTIC BAG, INJECTION (ML) INTRAVENOUS
Qty: 8 | Refills: 0 | Status: DISCONTINUED | OUTPATIENT
Start: 2017-11-13 | End: 2017-11-18

## 2017-11-13 RX ORDER — MAGNESIUM SULFATE 500 MG/ML
1 VIAL (ML) INJECTION ONCE
Qty: 0 | Refills: 0 | Status: DISCONTINUED | OUTPATIENT
Start: 2017-11-13 | End: 2017-11-13

## 2017-11-13 RX ORDER — WARFARIN SODIUM 2.5 MG/1
1 TABLET ORAL
Qty: 0 | Refills: 0 | COMMUNITY

## 2017-11-13 RX ORDER — SODIUM CHLORIDE 9 MG/ML
1000 INJECTION INTRAMUSCULAR; INTRAVENOUS; SUBCUTANEOUS
Qty: 0 | Refills: 0 | Status: DISCONTINUED | OUTPATIENT
Start: 2017-11-13 | End: 2017-11-16

## 2017-11-13 RX ORDER — FUROSEMIDE 40 MG
1 TABLET ORAL
Qty: 0 | Refills: 0 | COMMUNITY

## 2017-11-13 RX ORDER — CALCIUM GLUCONATE 100 MG/ML
2 VIAL (ML) INTRAVENOUS ONCE
Qty: 0 | Refills: 0 | Status: COMPLETED | OUTPATIENT
Start: 2017-11-13 | End: 2017-11-13

## 2017-11-13 RX ORDER — LEVOTHYROXINE SODIUM 125 MCG
1 TABLET ORAL
Qty: 0 | Refills: 0 | COMMUNITY

## 2017-11-13 RX ADMIN — PIPERACILLIN AND TAZOBACTAM 25 GRAM(S): 4; .5 INJECTION, POWDER, LYOPHILIZED, FOR SOLUTION INTRAVENOUS at 05:43

## 2017-11-13 RX ADMIN — SODIUM CHLORIDE 100 MILLILITER(S): 9 INJECTION INTRAMUSCULAR; INTRAVENOUS; SUBCUTANEOUS at 14:06

## 2017-11-13 RX ADMIN — Medication 1.7 MICROGRAM(S)/KG/MIN: at 19:47

## 2017-11-13 RX ADMIN — Medication 650 MILLIGRAM(S): at 14:31

## 2017-11-13 RX ADMIN — Medication 1 MILLIGRAM(S): at 00:35

## 2017-11-13 RX ADMIN — Medication 2.5 MILLIGRAM(S): at 11:58

## 2017-11-13 RX ADMIN — Medication 400 GRAM(S): at 20:07

## 2017-11-13 RX ADMIN — SODIUM CHLORIDE 2000 MILLILITER(S): 9 INJECTION INTRAMUSCULAR; INTRAVENOUS; SUBCUTANEOUS at 17:48

## 2017-11-13 RX ADMIN — Medication 1 MILLIGRAM(S): at 22:46

## 2017-11-13 RX ADMIN — SODIUM CHLORIDE 200 MILLILITER(S): 9 INJECTION, SOLUTION INTRAVENOUS at 20:06

## 2017-11-13 RX ADMIN — Medication 175 MICROGRAM(S): at 05:43

## 2017-11-13 RX ADMIN — Medication 1.7 MICROGRAM(S)/KG/MIN: at 17:25

## 2017-11-13 RX ADMIN — Medication 81 MILLIGRAM(S): at 11:57

## 2017-11-13 RX ADMIN — PANTOPRAZOLE SODIUM 40 MILLIGRAM(S): 20 TABLET, DELAYED RELEASE ORAL at 11:13

## 2017-11-13 RX ADMIN — SODIUM CHLORIDE 125 MILLILITER(S): 9 INJECTION INTRAMUSCULAR; INTRAVENOUS; SUBCUTANEOUS at 05:44

## 2017-11-13 RX ADMIN — Medication 200 GRAM(S): at 01:36

## 2017-11-13 RX ADMIN — Medication 1.7 MICROGRAM(S)/KG/MIN: at 22:43

## 2017-11-13 RX ADMIN — Medication 100 GRAM(S): at 22:43

## 2017-11-13 RX ADMIN — SODIUM CHLORIDE 100 MILLILITER(S): 9 INJECTION, SOLUTION INTRAVENOUS at 11:30

## 2017-11-13 RX ADMIN — MEROPENEM 100 MILLIGRAM(S): 1 INJECTION INTRAVENOUS at 17:27

## 2017-11-13 RX ADMIN — PROPOFOL 2.72 MICROGRAM(S)/KG/MIN: 10 INJECTION, EMULSION INTRAVENOUS at 05:43

## 2017-11-13 RX ADMIN — Medication 75 MEQ/KG/HR: at 00:29

## 2017-11-13 RX ADMIN — Medication 1.7 MICROGRAM(S)/KG/MIN: at 17:26

## 2017-11-13 RX ADMIN — PROPOFOL 2.72 MICROGRAM(S)/KG/MIN: 10 INJECTION, EMULSION INTRAVENOUS at 00:38

## 2017-11-14 LAB
-  AMIKACIN: SIGNIFICANT CHANGE UP
-  AMPICILLIN/SULBACTAM: SIGNIFICANT CHANGE UP
-  AMPICILLIN: SIGNIFICANT CHANGE UP
-  AZTREONAM: SIGNIFICANT CHANGE UP
-  CEFAZOLIN: SIGNIFICANT CHANGE UP
-  CEFEPIME: SIGNIFICANT CHANGE UP
-  CEFOXITIN: SIGNIFICANT CHANGE UP
-  CEFTAZIDIME: SIGNIFICANT CHANGE UP
-  CEFTRIAXONE: SIGNIFICANT CHANGE UP
-  CIPROFLOXACIN: SIGNIFICANT CHANGE UP
-  COAGULASE NEGATIVE STAPHYLOCOCCUS: SIGNIFICANT CHANGE UP
-  ERTAPENEM: SIGNIFICANT CHANGE UP
-  GENTAMICIN: SIGNIFICANT CHANGE UP
-  IMIPENEM: SIGNIFICANT CHANGE UP
-  LEVOFLOXACIN: SIGNIFICANT CHANGE UP
-  MEROPENEM: SIGNIFICANT CHANGE UP
-  NITROFURANTOIN: SIGNIFICANT CHANGE UP
-  PIPERACILLIN/TAZOBACTAM: SIGNIFICANT CHANGE UP
-  TOBRAMYCIN: SIGNIFICANT CHANGE UP
-  TRIMETHOPRIM/SULFAMETHOXAZOLE: SIGNIFICANT CHANGE UP
ALBUMIN SERPL ELPH-MCNC: 1.3 G/DL — LOW (ref 3.3–5)
ALBUMIN SERPL ELPH-MCNC: 1.4 G/DL — LOW (ref 3.3–5)
ALBUMIN SERPL ELPH-MCNC: 1.6 G/DL — LOW (ref 3.3–5)
ALP SERPL-CCNC: 156 U/L — HIGH (ref 40–120)
ALP SERPL-CCNC: 162 U/L — HIGH (ref 40–120)
ALP SERPL-CCNC: 190 U/L — HIGH (ref 40–120)
ALT FLD-CCNC: 1379 U/L — HIGH (ref 12–78)
ALT FLD-CCNC: 1393 U/L — HIGH (ref 12–78)
ALT FLD-CCNC: 1566 U/L — HIGH (ref 12–78)
ANION GAP SERPL CALC-SCNC: 12 MMOL/L — SIGNIFICANT CHANGE UP (ref 5–17)
ANION GAP SERPL CALC-SCNC: 15 MMOL/L — SIGNIFICANT CHANGE UP (ref 5–17)
ANION GAP SERPL CALC-SCNC: 17 MMOL/L — SIGNIFICANT CHANGE UP (ref 5–17)
AST SERPL-CCNC: 1616 U/L — HIGH (ref 15–37)
AST SERPL-CCNC: 1679 U/L — HIGH (ref 15–37)
AST SERPL-CCNC: 1968 U/L — HIGH (ref 15–37)
BASE EXCESS BLDA CALC-SCNC: -5 MMOL/L — LOW (ref -2–2)
BASE EXCESS BLDA CALC-SCNC: -9 MMOL/L — LOW (ref -2–2)
BASE EXCESS BLDV CALC-SCNC: -11 MMOL/L — LOW (ref -2–2)
BASE EXCESS BLDV CALC-SCNC: -3.7 MMOL/L — LOW (ref -2–2)
BILIRUB SERPL-MCNC: 2.2 MG/DL — HIGH (ref 0.2–1.2)
BILIRUB SERPL-MCNC: 2.4 MG/DL — HIGH (ref 0.2–1.2)
BILIRUB SERPL-MCNC: 2.5 MG/DL — HIGH (ref 0.2–1.2)
BLOOD GAS COMMENTS ARTERIAL: SIGNIFICANT CHANGE UP
BLOOD GAS COMMENTS ARTERIAL: SIGNIFICANT CHANGE UP
BUN SERPL-MCNC: 63 MG/DL — HIGH (ref 7–23)
BUN SERPL-MCNC: 64 MG/DL — HIGH (ref 7–23)
BUN SERPL-MCNC: 68 MG/DL — HIGH (ref 7–23)
CALCIUM SERPL-MCNC: 5.2 MG/DL — CRITICAL LOW (ref 8.5–10.1)
CALCIUM SERPL-MCNC: 5.6 MG/DL — CRITICAL LOW (ref 8.5–10.1)
CALCIUM SERPL-MCNC: <5 MG/DL — CRITICAL LOW (ref 8.5–10.1)
CHLORIDE SERPL-SCNC: 105 MMOL/L — SIGNIFICANT CHANGE UP (ref 96–108)
CHLORIDE SERPL-SCNC: 108 MMOL/L — SIGNIFICANT CHANGE UP (ref 96–108)
CHLORIDE SERPL-SCNC: 111 MMOL/L — HIGH (ref 96–108)
CO2 SERPL-SCNC: 18 MMOL/L — LOW (ref 22–31)
CO2 SERPL-SCNC: 19 MMOL/L — LOW (ref 22–31)
CO2 SERPL-SCNC: 21 MMOL/L — LOW (ref 22–31)
CREAT SERPL-MCNC: 4.23 MG/DL — HIGH (ref 0.5–1.3)
CREAT SERPL-MCNC: 4.61 MG/DL — HIGH (ref 0.5–1.3)
CREAT SERPL-MCNC: 5 MG/DL — HIGH (ref 0.5–1.3)
CULTURE RESULTS: SIGNIFICANT CHANGE UP
GAS PNL BLDA: SIGNIFICANT CHANGE UP
GAS PNL BLDA: SIGNIFICANT CHANGE UP
GLUCOSE BLDC GLUCOMTR-MCNC: 101 MG/DL — HIGH (ref 70–99)
GLUCOSE SERPL-MCNC: 102 MG/DL — HIGH (ref 70–99)
GLUCOSE SERPL-MCNC: 108 MG/DL — HIGH (ref 70–99)
GLUCOSE SERPL-MCNC: 114 MG/DL — HIGH (ref 70–99)
GRAM STN FLD: SIGNIFICANT CHANGE UP
GRAM STN FLD: SIGNIFICANT CHANGE UP
HCO3 BLDA-SCNC: 16 MMOL/L — LOW (ref 21–29)
HCO3 BLDA-SCNC: 19 MMOL/L — LOW (ref 21–29)
HCO3 BLDV-SCNC: 16 MMOL/L — LOW (ref 21–29)
HCO3 BLDV-SCNC: 22 MMOL/L — SIGNIFICANT CHANGE UP (ref 21–29)
HCT VFR BLD CALC: 31.1 % — LOW (ref 34.5–45)
HCT VFR BLD CALC: 33 % — LOW (ref 34.5–45)
HGB BLD-MCNC: 10.3 G/DL — LOW (ref 11.5–15.5)
HGB BLD-MCNC: 10.4 G/DL — LOW (ref 11.5–15.5)
INR BLD: 1.87 RATIO — HIGH (ref 0.88–1.16)
INR BLD: 2.16 RATIO — HIGH (ref 0.88–1.16)
INR BLD: 2.89 RATIO — HIGH (ref 0.88–1.16)
LACTATE SERPL-SCNC: 4.1 MMOL/L — CRITICAL HIGH (ref 0.7–2)
LIDOCAIN IGE QN: 7013 U/L — HIGH (ref 73–393)
MAGNESIUM SERPL-MCNC: 1.4 MG/DL — LOW (ref 1.6–2.6)
MCHC RBC-ENTMCNC: 27.4 PG — SIGNIFICANT CHANGE UP (ref 27–34)
MCHC RBC-ENTMCNC: 28.7 PG — SIGNIFICANT CHANGE UP (ref 27–34)
MCHC RBC-ENTMCNC: 31.4 GM/DL — LOW (ref 32–36)
MCHC RBC-ENTMCNC: 33.2 GM/DL — SIGNIFICANT CHANGE UP (ref 32–36)
MCV RBC AUTO: 86.5 FL — SIGNIFICANT CHANGE UP (ref 80–100)
MCV RBC AUTO: 87.4 FL — SIGNIFICANT CHANGE UP (ref 80–100)
METHOD TYPE: SIGNIFICANT CHANGE UP
METHOD TYPE: SIGNIFICANT CHANGE UP
ORGANISM # SPEC MICROSCOPIC CNT: SIGNIFICANT CHANGE UP
ORGANISM # SPEC MICROSCOPIC CNT: SIGNIFICANT CHANGE UP
PCO2 BLDA: 31 MMHG — LOW (ref 32–46)
PCO2 BLDA: 32 MMHG — SIGNIFICANT CHANGE UP (ref 32–46)
PCO2 BLDV: 42 MMHG — SIGNIFICANT CHANGE UP (ref 35–50)
PCO2 BLDV: 44 MMHG — SIGNIFICANT CHANGE UP (ref 35–50)
PH BLDA: 7.33 — LOW (ref 7.35–7.45)
PH BLDA: 7.4 — SIGNIFICANT CHANGE UP (ref 7.35–7.45)
PH BLDV: 7.2 — LOW (ref 7.35–7.45)
PH BLDV: 7.32 — LOW (ref 7.35–7.45)
PHOSPHATE SERPL-MCNC: 3.5 MG/DL — SIGNIFICANT CHANGE UP (ref 2.5–4.5)
PLATELET # BLD AUTO: 303 K/UL — SIGNIFICANT CHANGE UP (ref 150–400)
PLATELET # BLD AUTO: 308 K/UL — SIGNIFICANT CHANGE UP (ref 150–400)
PO2 BLDA: 70 — SIGNIFICANT CHANGE UP
PO2 BLDA: 96 — SIGNIFICANT CHANGE UP
PO2 BLDV: 48 MMHG — HIGH (ref 25–45)
PO2 BLDV: 60 MMHG — HIGH (ref 25–45)
POTASSIUM SERPL-MCNC: 4 MMOL/L — SIGNIFICANT CHANGE UP (ref 3.5–5.3)
POTASSIUM SERPL-MCNC: 4.2 MMOL/L — SIGNIFICANT CHANGE UP (ref 3.5–5.3)
POTASSIUM SERPL-MCNC: 4.2 MMOL/L — SIGNIFICANT CHANGE UP (ref 3.5–5.3)
POTASSIUM SERPL-SCNC: 4 MMOL/L — SIGNIFICANT CHANGE UP (ref 3.5–5.3)
POTASSIUM SERPL-SCNC: 4.2 MMOL/L — SIGNIFICANT CHANGE UP (ref 3.5–5.3)
POTASSIUM SERPL-SCNC: 4.2 MMOL/L — SIGNIFICANT CHANGE UP (ref 3.5–5.3)
PROT SERPL-MCNC: 4.2 GM/DL — LOW (ref 6–8.3)
PROT SERPL-MCNC: 4.3 GM/DL — LOW (ref 6–8.3)
PROT SERPL-MCNC: 4.7 GM/DL — LOW (ref 6–8.3)
PROTHROM AB SERPL-ACNC: 20.5 SEC — HIGH (ref 9.8–12.7)
PROTHROM AB SERPL-ACNC: 23.7 SEC — HIGH (ref 9.8–12.7)
PROTHROM AB SERPL-ACNC: 31.9 SEC — HIGH (ref 9.8–12.7)
RBC # BLD: 3.6 M/UL — LOW (ref 3.8–5.2)
RBC # BLD: 3.78 M/UL — LOW (ref 3.8–5.2)
RBC # FLD: 14.1 % — SIGNIFICANT CHANGE UP (ref 10.3–14.5)
RBC # FLD: 14.2 % — SIGNIFICANT CHANGE UP (ref 10.3–14.5)
SAO2 % BLDA: 92 — SIGNIFICANT CHANGE UP
SAO2 % BLDA: 96 — SIGNIFICANT CHANGE UP
SAO2 % BLDV: 75 % — SIGNIFICANT CHANGE UP (ref 67–88)
SAO2 % BLDV: 87 % — SIGNIFICANT CHANGE UP (ref 67–88)
SODIUM SERPL-SCNC: 141 MMOL/L — SIGNIFICANT CHANGE UP (ref 135–145)
SODIUM SERPL-SCNC: 142 MMOL/L — SIGNIFICANT CHANGE UP (ref 135–145)
SODIUM SERPL-SCNC: 143 MMOL/L — SIGNIFICANT CHANGE UP (ref 135–145)
SPECIMEN SOURCE: SIGNIFICANT CHANGE UP
SPECIMEN SOURCE: SIGNIFICANT CHANGE UP
WBC # BLD: 15.3 K/UL — HIGH (ref 3.8–10.5)
WBC # BLD: 17.5 K/UL — HIGH (ref 3.8–10.5)
WBC # FLD AUTO: 15.3 K/UL — HIGH (ref 3.8–10.5)
WBC # FLD AUTO: 17.5 K/UL — HIGH (ref 3.8–10.5)

## 2017-11-14 PROCEDURE — 71010: CPT | Mod: 26

## 2017-11-14 RX ORDER — CALCIUM GLUCONATE 100 MG/ML
2 VIAL (ML) INTRAVENOUS ONCE
Qty: 0 | Refills: 0 | Status: COMPLETED | OUTPATIENT
Start: 2017-11-14 | End: 2017-11-14

## 2017-11-14 RX ORDER — CHLORHEXIDINE GLUCONATE 213 G/1000ML
5 SOLUTION TOPICAL
Qty: 0 | Refills: 0 | Status: DISCONTINUED | OUTPATIENT
Start: 2017-11-14 | End: 2017-11-18

## 2017-11-14 RX ORDER — VASOPRESSIN 20 [USP'U]/ML
0.04 INJECTION INTRAVENOUS
Qty: 100 | Refills: 0 | Status: DISCONTINUED | OUTPATIENT
Start: 2017-11-14 | End: 2017-11-18

## 2017-11-14 RX ORDER — MAGNESIUM SULFATE 500 MG/ML
1 VIAL (ML) INJECTION ONCE
Qty: 0 | Refills: 0 | Status: COMPLETED | OUTPATIENT
Start: 2017-11-14 | End: 2017-11-14

## 2017-11-14 RX ORDER — CALCIUM GLUCONATE 100 MG/ML
1 VIAL (ML) INTRAVENOUS ONCE
Qty: 0 | Refills: 0 | Status: COMPLETED | OUTPATIENT
Start: 2017-11-14 | End: 2017-11-14

## 2017-11-14 RX ADMIN — SODIUM CHLORIDE 100 MILLILITER(S): 9 INJECTION INTRAMUSCULAR; INTRAVENOUS; SUBCUTANEOUS at 12:00

## 2017-11-14 RX ADMIN — SODIUM CHLORIDE 200 MILLILITER(S): 9 INJECTION, SOLUTION INTRAVENOUS at 12:57

## 2017-11-14 RX ADMIN — SODIUM CHLORIDE 200 MILLILITER(S): 9 INJECTION, SOLUTION INTRAVENOUS at 23:51

## 2017-11-14 RX ADMIN — SODIUM CHLORIDE 100 MILLILITER(S): 9 INJECTION INTRAMUSCULAR; INTRAVENOUS; SUBCUTANEOUS at 01:49

## 2017-11-14 RX ADMIN — Medication 1.7 MICROGRAM(S)/KG/MIN: at 23:06

## 2017-11-14 RX ADMIN — Medication 175 MICROGRAM(S): at 05:01

## 2017-11-14 RX ADMIN — Medication 400 GRAM(S): at 10:33

## 2017-11-14 RX ADMIN — Medication 650 MILLIGRAM(S): at 18:41

## 2017-11-14 RX ADMIN — MEROPENEM 100 MILLIGRAM(S): 1 INJECTION INTRAVENOUS at 17:44

## 2017-11-14 RX ADMIN — Medication 1.7 MICROGRAM(S)/KG/MIN: at 19:26

## 2017-11-14 RX ADMIN — PANTOPRAZOLE SODIUM 40 MILLIGRAM(S): 20 TABLET, DELAYED RELEASE ORAL at 12:57

## 2017-11-14 RX ADMIN — Medication 100 GRAM(S): at 11:18

## 2017-11-14 RX ADMIN — MEROPENEM 100 MILLIGRAM(S): 1 INJECTION INTRAVENOUS at 05:01

## 2017-11-14 RX ADMIN — Medication 200 GRAM(S): at 01:49

## 2017-11-14 RX ADMIN — Medication 1.7 MICROGRAM(S)/KG/MIN: at 02:56

## 2017-11-14 RX ADMIN — Medication 650 MILLIGRAM(S): at 13:05

## 2017-11-14 RX ADMIN — Medication 400 GRAM(S): at 21:47

## 2017-11-14 RX ADMIN — SODIUM CHLORIDE 100 MILLILITER(S): 9 INJECTION INTRAMUSCULAR; INTRAVENOUS; SUBCUTANEOUS at 21:50

## 2017-11-14 RX ADMIN — VASOPRESSIN 2.4 UNIT(S)/MIN: 20 INJECTION INTRAVENOUS at 17:58

## 2017-11-14 RX ADMIN — Medication 81 MILLIGRAM(S): at 12:57

## 2017-11-14 RX ADMIN — SODIUM CHLORIDE 200 MILLILITER(S): 9 INJECTION, SOLUTION INTRAVENOUS at 01:50

## 2017-11-14 RX ADMIN — Medication 1.7 MICROGRAM(S)/KG/MIN: at 09:36

## 2017-11-14 RX ADMIN — CHLORHEXIDINE GLUCONATE 5 MILLILITER(S): 213 SOLUTION TOPICAL at 17:44

## 2017-11-14 RX ADMIN — Medication 1.7 MICROGRAM(S)/KG/MIN: at 16:04

## 2017-11-14 RX ADMIN — SODIUM CHLORIDE 200 MILLILITER(S): 9 INJECTION, SOLUTION INTRAVENOUS at 06:08

## 2017-11-14 RX ADMIN — Medication 650 MILLIGRAM(S): at 06:04

## 2017-11-14 RX ADMIN — SODIUM CHLORIDE 200 MILLILITER(S): 9 INJECTION, SOLUTION INTRAVENOUS at 18:29

## 2017-11-15 LAB
ADD ON TEST-SPECIMEN IN LAB: SIGNIFICANT CHANGE UP
ALBUMIN SERPL ELPH-MCNC: 1 G/DL — LOW (ref 3.3–5)
ALBUMIN SERPL ELPH-MCNC: 1.1 G/DL — LOW (ref 3.3–5)
ALBUMIN SERPL ELPH-MCNC: 1.1 G/DL — LOW (ref 3.3–5)
ALP SERPL-CCNC: 139 U/L — HIGH (ref 40–120)
ALP SERPL-CCNC: 141 U/L — HIGH (ref 40–120)
ALP SERPL-CCNC: 145 U/L — HIGH (ref 40–120)
ALT FLD-CCNC: 1096 U/L — HIGH (ref 12–78)
ALT FLD-CCNC: 1198 U/L — HIGH (ref 12–78)
ALT FLD-CCNC: 910 U/L — HIGH (ref 12–78)
ANION GAP SERPL CALC-SCNC: 12 MMOL/L — SIGNIFICANT CHANGE UP (ref 5–17)
ANION GAP SERPL CALC-SCNC: 13 MMOL/L — SIGNIFICANT CHANGE UP (ref 5–17)
ANION GAP SERPL CALC-SCNC: 14 MMOL/L — SIGNIFICANT CHANGE UP (ref 5–17)
AST SERPL-CCNC: 1252 U/L — HIGH (ref 15–37)
AST SERPL-CCNC: 1410 U/L — HIGH (ref 15–37)
AST SERPL-CCNC: 988 U/L — HIGH (ref 15–37)
BILIRUB SERPL-MCNC: 2.2 MG/DL — HIGH (ref 0.2–1.2)
BILIRUB SERPL-MCNC: 2.5 MG/DL — HIGH (ref 0.2–1.2)
BILIRUB SERPL-MCNC: 2.6 MG/DL — HIGH (ref 0.2–1.2)
BUN SERPL-MCNC: 67 MG/DL — HIGH (ref 7–23)
BUN SERPL-MCNC: 72 MG/DL — HIGH (ref 7–23)
BUN SERPL-MCNC: 76 MG/DL — HIGH (ref 7–23)
CA-I BLD-SCNC: 0.68 MMOL/L — CRITICAL LOW (ref 1.12–1.3)
CALCIUM SERPL-MCNC: 5.1 MG/DL — CRITICAL LOW (ref 8.5–10.1)
CALCIUM SERPL-MCNC: 5.5 MG/DL — CRITICAL LOW (ref 8.5–10.1)
CALCIUM SERPL-MCNC: <3 MG/DL (ref 8.5–10.1)
CHLORIDE SERPL-SCNC: 100 MMOL/L — SIGNIFICANT CHANGE UP (ref 96–108)
CHLORIDE SERPL-SCNC: 100 MMOL/L — SIGNIFICANT CHANGE UP (ref 96–108)
CHLORIDE SERPL-SCNC: 103 MMOL/L — SIGNIFICANT CHANGE UP (ref 96–108)
CO2 SERPL-SCNC: 23 MMOL/L — SIGNIFICANT CHANGE UP (ref 22–31)
CO2 SERPL-SCNC: 24 MMOL/L — SIGNIFICANT CHANGE UP (ref 22–31)
CO2 SERPL-SCNC: 26 MMOL/L — SIGNIFICANT CHANGE UP (ref 22–31)
CREAT SERPL-MCNC: 5.26 MG/DL — HIGH (ref 0.5–1.3)
CREAT SERPL-MCNC: 5.49 MG/DL — HIGH (ref 0.5–1.3)
CREAT SERPL-MCNC: 6.08 MG/DL — HIGH (ref 0.5–1.3)
CULTURE RESULTS: SIGNIFICANT CHANGE UP
GLUCOSE SERPL-MCNC: 153 MG/DL — HIGH (ref 70–99)
GLUCOSE SERPL-MCNC: 167 MG/DL — HIGH (ref 70–99)
GLUCOSE SERPL-MCNC: 80 MG/DL — SIGNIFICANT CHANGE UP (ref 70–99)
HCT VFR BLD CALC: 30.4 % — LOW (ref 34.5–45)
HGB BLD-MCNC: 9.4 G/DL — LOW (ref 11.5–15.5)
INR BLD: 2.36 RATIO — HIGH (ref 0.88–1.16)
LIDOCAIN IGE QN: 3262 U/L — HIGH (ref 73–393)
MAGNESIUM SERPL-MCNC: 1.5 MG/DL — LOW (ref 1.6–2.6)
MCHC RBC-ENTMCNC: 26.9 PG — LOW (ref 27–34)
MCHC RBC-ENTMCNC: 31 GM/DL — LOW (ref 32–36)
MCV RBC AUTO: 86.7 FL — SIGNIFICANT CHANGE UP (ref 80–100)
ORGANISM # SPEC MICROSCOPIC CNT: SIGNIFICANT CHANGE UP
ORGANISM # SPEC MICROSCOPIC CNT: SIGNIFICANT CHANGE UP
PHOSPHATE SERPL-MCNC: 5.1 MG/DL — HIGH (ref 2.5–4.5)
PLATELET # BLD AUTO: 225 K/UL — SIGNIFICANT CHANGE UP (ref 150–400)
POTASSIUM SERPL-MCNC: 4 MMOL/L — SIGNIFICANT CHANGE UP (ref 3.5–5.3)
POTASSIUM SERPL-MCNC: 4 MMOL/L — SIGNIFICANT CHANGE UP (ref 3.5–5.3)
POTASSIUM SERPL-MCNC: 4.4 MMOL/L — SIGNIFICANT CHANGE UP (ref 3.5–5.3)
POTASSIUM SERPL-SCNC: 4 MMOL/L — SIGNIFICANT CHANGE UP (ref 3.5–5.3)
POTASSIUM SERPL-SCNC: 4 MMOL/L — SIGNIFICANT CHANGE UP (ref 3.5–5.3)
POTASSIUM SERPL-SCNC: 4.4 MMOL/L — SIGNIFICANT CHANGE UP (ref 3.5–5.3)
PROT SERPL-MCNC: 4 GM/DL — LOW (ref 6–8.3)
PROT SERPL-MCNC: 4 GM/DL — LOW (ref 6–8.3)
PROT SERPL-MCNC: 4.5 GM/DL — LOW (ref 6–8.3)
PROTHROM AB SERPL-ACNC: 25.9 SEC — HIGH (ref 9.8–12.7)
RBC # BLD: 3.51 M/UL — LOW (ref 3.8–5.2)
RBC # FLD: 14.4 % — SIGNIFICANT CHANGE UP (ref 10.3–14.5)
SODIUM SERPL-SCNC: 138 MMOL/L — SIGNIFICANT CHANGE UP (ref 135–145)
SODIUM SERPL-SCNC: 138 MMOL/L — SIGNIFICANT CHANGE UP (ref 135–145)
SODIUM SERPL-SCNC: 139 MMOL/L — SIGNIFICANT CHANGE UP (ref 135–145)
SPECIMEN SOURCE: SIGNIFICANT CHANGE UP
WBC # BLD: 14.1 K/UL — HIGH (ref 3.8–10.5)
WBC # FLD AUTO: 14.1 K/UL — HIGH (ref 3.8–10.5)

## 2017-11-15 PROCEDURE — 74181 MRI ABDOMEN W/O CONTRAST: CPT | Mod: 26

## 2017-11-15 RX ORDER — MEROPENEM 1 G/30ML
500 INJECTION INTRAVENOUS DAILY
Qty: 0 | Refills: 0 | Status: DISCONTINUED | OUTPATIENT
Start: 2017-11-16 | End: 2017-11-18

## 2017-11-15 RX ORDER — CALCIUM GLUCONATE 100 MG/ML
2 VIAL (ML) INTRAVENOUS ONCE
Qty: 0 | Refills: 0 | Status: DISCONTINUED | OUTPATIENT
Start: 2017-11-15 | End: 2017-11-15

## 2017-11-15 RX ORDER — CALCIUM GLUCONATE 100 MG/ML
2 VIAL (ML) INTRAVENOUS ONCE
Qty: 0 | Refills: 0 | Status: COMPLETED | OUTPATIENT
Start: 2017-11-15 | End: 2017-11-15

## 2017-11-15 RX ORDER — CALCIUM GLUCONATE 100 MG/ML
2 VIAL (ML) INTRAVENOUS
Qty: 0 | Refills: 0 | Status: COMPLETED | OUTPATIENT
Start: 2017-11-15 | End: 2017-11-15

## 2017-11-15 RX ORDER — MAGNESIUM SULFATE 500 MG/ML
1 VIAL (ML) INJECTION ONCE
Qty: 0 | Refills: 0 | Status: COMPLETED | OUTPATIENT
Start: 2017-11-15 | End: 2017-11-15

## 2017-11-15 RX ADMIN — VASOPRESSIN 2.4 UNIT(S)/MIN: 20 INJECTION INTRAVENOUS at 22:31

## 2017-11-15 RX ADMIN — PANTOPRAZOLE SODIUM 40 MILLIGRAM(S): 20 TABLET, DELAYED RELEASE ORAL at 12:37

## 2017-11-15 RX ADMIN — SODIUM CHLORIDE 100 MILLILITER(S): 9 INJECTION INTRAMUSCULAR; INTRAVENOUS; SUBCUTANEOUS at 05:19

## 2017-11-15 RX ADMIN — Medication 175 MICROGRAM(S): at 05:19

## 2017-11-15 RX ADMIN — Medication 1.7 MICROGRAM(S)/KG/MIN: at 05:19

## 2017-11-15 RX ADMIN — Medication 650 MILLIGRAM(S): at 00:45

## 2017-11-15 RX ADMIN — Medication 100 GRAM(S): at 11:06

## 2017-11-15 RX ADMIN — Medication 1 MILLIGRAM(S): at 20:10

## 2017-11-15 RX ADMIN — CHLORHEXIDINE GLUCONATE 5 MILLILITER(S): 213 SOLUTION TOPICAL at 05:19

## 2017-11-15 RX ADMIN — SODIUM CHLORIDE 200 MILLILITER(S): 9 INJECTION, SOLUTION INTRAVENOUS at 05:19

## 2017-11-15 RX ADMIN — Medication 200 GRAM(S): at 18:06

## 2017-11-15 RX ADMIN — Medication 200 GRAM(S): at 07:16

## 2017-11-15 RX ADMIN — MEROPENEM 100 MILLIGRAM(S): 1 INJECTION INTRAVENOUS at 05:19

## 2017-11-15 RX ADMIN — Medication 81 MILLIGRAM(S): at 12:37

## 2017-11-15 RX ADMIN — CHLORHEXIDINE GLUCONATE 5 MILLILITER(S): 213 SOLUTION TOPICAL at 18:07

## 2017-11-15 RX ADMIN — Medication 1.7 MICROGRAM(S)/KG/MIN: at 12:37

## 2017-11-15 RX ADMIN — Medication 650 MILLIGRAM(S): at 18:51

## 2017-11-15 RX ADMIN — Medication 1.7 MICROGRAM(S)/KG/MIN: at 19:20

## 2017-11-15 RX ADMIN — SODIUM CHLORIDE 100 MILLILITER(S): 9 INJECTION INTRAMUSCULAR; INTRAVENOUS; SUBCUTANEOUS at 16:10

## 2017-11-15 RX ADMIN — Medication 400 GRAM(S): at 17:03

## 2017-11-16 LAB
ALBUMIN SERPL ELPH-MCNC: 0.9 G/DL — LOW (ref 3.3–5)
ALP SERPL-CCNC: 140 U/L — HIGH (ref 40–120)
ALT FLD-CCNC: 743 U/L — HIGH (ref 12–78)
ANION GAP SERPL CALC-SCNC: 13 MMOL/L — SIGNIFICANT CHANGE UP (ref 5–17)
ANION GAP SERPL CALC-SCNC: 14 MMOL/L — SIGNIFICANT CHANGE UP (ref 5–17)
AST SERPL-CCNC: 840 U/L — HIGH (ref 15–37)
BILIRUB SERPL-MCNC: 2.3 MG/DL — HIGH (ref 0.2–1.2)
BUN SERPL-MCNC: 76 MG/DL — HIGH (ref 7–23)
BUN SERPL-MCNC: 83 MG/DL — HIGH (ref 7–23)
CA-I BLD-SCNC: 0.72 MMOL/L — LOW (ref 1.12–1.3)
CALCIUM SERPL-MCNC: 5.2 MG/DL — CRITICAL LOW (ref 8.5–10.1)
CALCIUM SERPL-MCNC: 5.5 MG/DL — CRITICAL LOW (ref 8.5–10.1)
CHLORIDE SERPL-SCNC: 101 MMOL/L — SIGNIFICANT CHANGE UP (ref 96–108)
CHLORIDE SERPL-SCNC: 103 MMOL/L — SIGNIFICANT CHANGE UP (ref 96–108)
CO2 SERPL-SCNC: 19 MMOL/L — LOW (ref 22–31)
CO2 SERPL-SCNC: 23 MMOL/L — SIGNIFICANT CHANGE UP (ref 22–31)
CREAT SERPL-MCNC: 6.03 MG/DL — HIGH (ref 0.5–1.3)
CREAT SERPL-MCNC: 6.14 MG/DL — HIGH (ref 0.5–1.3)
GLUCOSE BLDC GLUCOMTR-MCNC: 160 MG/DL — HIGH (ref 70–99)
GLUCOSE BLDC GLUCOMTR-MCNC: 198 MG/DL — HIGH (ref 70–99)
GLUCOSE BLDC GLUCOMTR-MCNC: 50 MG/DL — LOW (ref 70–99)
GLUCOSE BLDC GLUCOMTR-MCNC: 54 MG/DL — LOW (ref 70–99)
GLUCOSE BLDC GLUCOMTR-MCNC: 69 MG/DL — LOW (ref 70–99)
GLUCOSE BLDC GLUCOMTR-MCNC: 81 MG/DL — SIGNIFICANT CHANGE UP (ref 70–99)
GLUCOSE BLDC GLUCOMTR-MCNC: 93 MG/DL — SIGNIFICANT CHANGE UP (ref 70–99)
GLUCOSE SERPL-MCNC: 204 MG/DL — HIGH (ref 70–99)
GLUCOSE SERPL-MCNC: 54 MG/DL — LOW (ref 70–99)
HCT VFR BLD CALC: 29 % — LOW (ref 34.5–45)
HGB BLD-MCNC: 9.3 G/DL — LOW (ref 11.5–15.5)
INR BLD: 5.13 RATIO — CRITICAL HIGH (ref 0.88–1.16)
LIDOCAIN IGE QN: 3280 U/L — HIGH (ref 73–393)
MCHC RBC-ENTMCNC: 28 PG — SIGNIFICANT CHANGE UP (ref 27–34)
MCHC RBC-ENTMCNC: 32.2 GM/DL — SIGNIFICANT CHANGE UP (ref 32–36)
MCV RBC AUTO: 86.9 FL — SIGNIFICANT CHANGE UP (ref 80–100)
PLATELET # BLD AUTO: 189 K/UL — SIGNIFICANT CHANGE UP (ref 150–400)
POTASSIUM SERPL-MCNC: 4.1 MMOL/L — SIGNIFICANT CHANGE UP (ref 3.5–5.3)
POTASSIUM SERPL-MCNC: 5.1 MMOL/L — SIGNIFICANT CHANGE UP (ref 3.5–5.3)
POTASSIUM SERPL-SCNC: 4.1 MMOL/L — SIGNIFICANT CHANGE UP (ref 3.5–5.3)
POTASSIUM SERPL-SCNC: 5.1 MMOL/L — SIGNIFICANT CHANGE UP (ref 3.5–5.3)
PROT SERPL-MCNC: 4.5 GM/DL — LOW (ref 6–8.3)
PROTHROM AB SERPL-ACNC: 57.3 SEC — HIGH (ref 9.8–12.7)
RBC # BLD: 3.34 M/UL — LOW (ref 3.8–5.2)
RBC # FLD: 14.5 % — SIGNIFICANT CHANGE UP (ref 10.3–14.5)
SODIUM SERPL-SCNC: 134 MMOL/L — LOW (ref 135–145)
SODIUM SERPL-SCNC: 139 MMOL/L — SIGNIFICANT CHANGE UP (ref 135–145)
WBC # BLD: 23.1 K/UL — HIGH (ref 3.8–10.5)
WBC # FLD AUTO: 23.1 K/UL — HIGH (ref 3.8–10.5)

## 2017-11-16 RX ORDER — SODIUM CHLORIDE 9 MG/ML
1000 INJECTION, SOLUTION INTRAVENOUS
Qty: 0 | Refills: 0 | Status: DISCONTINUED | OUTPATIENT
Start: 2017-11-16 | End: 2017-11-18

## 2017-11-16 RX ORDER — CALCIUM GLUCONATE 100 MG/ML
2 VIAL (ML) INTRAVENOUS ONCE
Qty: 0 | Refills: 0 | Status: COMPLETED | OUTPATIENT
Start: 2017-11-16 | End: 2017-11-16

## 2017-11-16 RX ORDER — DEXTROSE 50 % IN WATER 50 %
25 SYRINGE (ML) INTRAVENOUS ONCE
Qty: 0 | Refills: 0 | Status: COMPLETED | OUTPATIENT
Start: 2017-11-16 | End: 2017-11-16

## 2017-11-16 RX ORDER — PHYTONADIONE (VIT K1) 5 MG
2.5 TABLET ORAL ONCE
Qty: 0 | Refills: 0 | Status: COMPLETED | OUTPATIENT
Start: 2017-11-16 | End: 2017-11-16

## 2017-11-16 RX ORDER — DEXTROSE 50 % IN WATER 50 %
12.5 SYRINGE (ML) INTRAVENOUS ONCE
Qty: 0 | Refills: 0 | Status: COMPLETED | OUTPATIENT
Start: 2017-11-16 | End: 2017-11-16

## 2017-11-16 RX ORDER — DEXTROSE 50 % IN WATER 50 %
25 SYRINGE (ML) INTRAVENOUS ONCE
Qty: 0 | Refills: 0 | Status: DISCONTINUED | OUTPATIENT
Start: 2017-11-16 | End: 2017-11-18

## 2017-11-16 RX ORDER — LEVOTHYROXINE SODIUM 125 MCG
90 TABLET ORAL DAILY
Qty: 0 | Refills: 0 | Status: DISCONTINUED | OUTPATIENT
Start: 2017-11-17 | End: 2017-11-18

## 2017-11-16 RX ORDER — GLUCAGON INJECTION, SOLUTION 0.5 MG/.1ML
1 INJECTION, SOLUTION SUBCUTANEOUS ONCE
Qty: 0 | Refills: 0 | Status: DISCONTINUED | OUTPATIENT
Start: 2017-11-16 | End: 2017-11-18

## 2017-11-16 RX ORDER — LEVOTHYROXINE SODIUM 125 MCG
90 TABLET ORAL DAILY
Qty: 0 | Refills: 0 | Status: DISCONTINUED | OUTPATIENT
Start: 2017-11-16 | End: 2017-11-16

## 2017-11-16 RX ORDER — DEXTROSE 50 % IN WATER 50 %
12.5 SYRINGE (ML) INTRAVENOUS ONCE
Qty: 0 | Refills: 0 | Status: DISCONTINUED | OUTPATIENT
Start: 2017-11-16 | End: 2017-11-18

## 2017-11-16 RX ORDER — DEXTROSE 50 % IN WATER 50 %
1 SYRINGE (ML) INTRAVENOUS ONCE
Qty: 0 | Refills: 0 | Status: DISCONTINUED | OUTPATIENT
Start: 2017-11-16 | End: 2017-11-18

## 2017-11-16 RX ADMIN — Medication 81 MILLIGRAM(S): at 11:34

## 2017-11-16 RX ADMIN — Medication 400 GRAM(S): at 10:14

## 2017-11-16 RX ADMIN — SODIUM CHLORIDE 100 MILLILITER(S): 9 INJECTION INTRAMUSCULAR; INTRAVENOUS; SUBCUTANEOUS at 03:29

## 2017-11-16 RX ADMIN — MEROPENEM 100 MILLIGRAM(S): 1 INJECTION INTRAVENOUS at 05:03

## 2017-11-16 RX ADMIN — Medication 1.7 MICROGRAM(S)/KG/MIN: at 10:43

## 2017-11-16 RX ADMIN — VASOPRESSIN 2.4 UNIT(S)/MIN: 20 INJECTION INTRAVENOUS at 23:44

## 2017-11-16 RX ADMIN — CHLORHEXIDINE GLUCONATE 5 MILLILITER(S): 213 SOLUTION TOPICAL at 18:28

## 2017-11-16 RX ADMIN — CHLORHEXIDINE GLUCONATE 5 MILLILITER(S): 213 SOLUTION TOPICAL at 05:03

## 2017-11-16 RX ADMIN — Medication 1.7 MICROGRAM(S)/KG/MIN: at 21:18

## 2017-11-16 RX ADMIN — Medication 2.5 MILLIGRAM(S): at 09:00

## 2017-11-16 RX ADMIN — Medication 400 GRAM(S): at 23:44

## 2017-11-16 RX ADMIN — Medication 25 GRAM(S): at 18:31

## 2017-11-16 RX ADMIN — Medication 1.7 MICROGRAM(S)/KG/MIN: at 01:32

## 2017-11-16 RX ADMIN — PANTOPRAZOLE SODIUM 40 MILLIGRAM(S): 20 TABLET, DELAYED RELEASE ORAL at 11:21

## 2017-11-16 RX ADMIN — Medication 400 GRAM(S): at 01:28

## 2017-11-16 RX ADMIN — Medication 12.5 GRAM(S): at 16:20

## 2017-11-16 RX ADMIN — Medication 12.5 GRAM(S): at 11:22

## 2017-11-16 RX ADMIN — SODIUM CHLORIDE 100 MILLILITER(S): 9 INJECTION, SOLUTION INTRAVENOUS at 18:31

## 2017-11-16 RX ADMIN — Medication 25 GRAM(S): at 17:14

## 2017-11-16 RX ADMIN — Medication 175 MICROGRAM(S): at 05:03

## 2017-11-17 DIAGNOSIS — R79.1 ABNORMAL COAGULATION PROFILE: ICD-10-CM

## 2017-11-17 DIAGNOSIS — E83.51 HYPOCALCEMIA: ICD-10-CM

## 2017-11-17 DIAGNOSIS — N17.9 ACUTE KIDNEY FAILURE, UNSPECIFIED: ICD-10-CM

## 2017-11-17 DIAGNOSIS — E16.2 HYPOGLYCEMIA, UNSPECIFIED: ICD-10-CM

## 2017-11-17 LAB
ALBUMIN SERPL ELPH-MCNC: 0.9 G/DL — LOW (ref 3.3–5)
ALBUMIN SERPL ELPH-MCNC: 1 G/DL — LOW (ref 3.3–5)
ALP SERPL-CCNC: 108 U/L — SIGNIFICANT CHANGE UP (ref 40–120)
ALP SERPL-CCNC: 115 U/L — SIGNIFICANT CHANGE UP (ref 40–120)
ALT FLD-CCNC: 289 U/L — HIGH (ref 12–78)
ALT FLD-CCNC: 403 U/L — HIGH (ref 12–78)
AMMONIA BLD-MCNC: 31 UMOL/L — SIGNIFICANT CHANGE UP (ref 11–32)
ANION GAP SERPL CALC-SCNC: 12 MMOL/L — SIGNIFICANT CHANGE UP (ref 5–17)
ANION GAP SERPL CALC-SCNC: 14 MMOL/L — SIGNIFICANT CHANGE UP (ref 5–17)
AST SERPL-CCNC: 414 U/L — HIGH (ref 15–37)
AST SERPL-CCNC: 506 U/L — HIGH (ref 15–37)
BASE EXCESS BLDV CALC-SCNC: -5.7 MMOL/L — LOW (ref -2–2)
BILIRUB SERPL-MCNC: 2.1 MG/DL — HIGH (ref 0.2–1.2)
BILIRUB SERPL-MCNC: 2.2 MG/DL — HIGH (ref 0.2–1.2)
BUN SERPL-MCNC: 87 MG/DL — HIGH (ref 7–23)
BUN SERPL-MCNC: 93 MG/DL — HIGH (ref 7–23)
CA-I BLD-SCNC: 0.75 MMOL/L — LOW (ref 1.12–1.3)
CALCIUM SERPL-MCNC: 5.4 MG/DL — CRITICAL LOW (ref 8.5–10.1)
CALCIUM SERPL-MCNC: 5.5 MG/DL — CRITICAL LOW (ref 8.5–10.1)
CALCIUM SERPL-MCNC: 5.6 MG/DL — CRITICAL LOW (ref 8.5–10.1)
CHLORIDE SERPL-SCNC: 101 MMOL/L — SIGNIFICANT CHANGE UP (ref 96–108)
CHLORIDE SERPL-SCNC: 104 MMOL/L — SIGNIFICANT CHANGE UP (ref 96–108)
CO2 SERPL-SCNC: 20 MMOL/L — LOW (ref 22–31)
CO2 SERPL-SCNC: 23 MMOL/L — SIGNIFICANT CHANGE UP (ref 22–31)
CREAT SERPL-MCNC: 6.02 MG/DL — HIGH (ref 0.5–1.3)
CREAT SERPL-MCNC: 6.07 MG/DL — HIGH (ref 0.5–1.3)
GLUCOSE BLDC GLUCOMTR-MCNC: 101 MG/DL — HIGH (ref 70–99)
GLUCOSE BLDC GLUCOMTR-MCNC: 105 MG/DL — HIGH (ref 70–99)
GLUCOSE BLDC GLUCOMTR-MCNC: 107 MG/DL — HIGH (ref 70–99)
GLUCOSE BLDC GLUCOMTR-MCNC: 107 MG/DL — HIGH (ref 70–99)
GLUCOSE BLDC GLUCOMTR-MCNC: 109 MG/DL — HIGH (ref 70–99)
GLUCOSE BLDC GLUCOMTR-MCNC: 109 MG/DL — HIGH (ref 70–99)
GLUCOSE BLDC GLUCOMTR-MCNC: 67 MG/DL — LOW (ref 70–99)
GLUCOSE BLDC GLUCOMTR-MCNC: 99 MG/DL — SIGNIFICANT CHANGE UP (ref 70–99)
GLUCOSE SERPL-MCNC: 105 MG/DL — HIGH (ref 70–99)
GLUCOSE SERPL-MCNC: 107 MG/DL — HIGH (ref 70–99)
HCO3 BLDV-SCNC: 21 MMOL/L — SIGNIFICANT CHANGE UP (ref 21–29)
HCT VFR BLD CALC: 25.5 % — LOW (ref 34.5–45)
HGB BLD-MCNC: 8.6 G/DL — LOW (ref 11.5–15.5)
INR BLD: 2.72 RATIO — HIGH (ref 0.88–1.16)
MAGNESIUM SERPL-MCNC: 1.8 MG/DL — SIGNIFICANT CHANGE UP (ref 1.6–2.6)
MCHC RBC-ENTMCNC: 29.1 PG — SIGNIFICANT CHANGE UP (ref 27–34)
MCHC RBC-ENTMCNC: 33.5 GM/DL — SIGNIFICANT CHANGE UP (ref 32–36)
MCV RBC AUTO: 86.8 FL — SIGNIFICANT CHANGE UP (ref 80–100)
PCO2 BLDV: 53 MMHG — HIGH (ref 35–50)
PH BLDV: 7.23 — LOW (ref 7.35–7.45)
PHOSPHATE SERPL-MCNC: 7.5 MG/DL — HIGH (ref 2.5–4.5)
PLATELET # BLD AUTO: 146 K/UL — LOW (ref 150–400)
PO2 BLDV: 139 MMHG — HIGH (ref 25–45)
POTASSIUM SERPL-MCNC: 4.2 MMOL/L — SIGNIFICANT CHANGE UP (ref 3.5–5.3)
POTASSIUM SERPL-MCNC: 4.2 MMOL/L — SIGNIFICANT CHANGE UP (ref 3.5–5.3)
POTASSIUM SERPL-SCNC: 4.2 MMOL/L — SIGNIFICANT CHANGE UP (ref 3.5–5.3)
POTASSIUM SERPL-SCNC: 4.2 MMOL/L — SIGNIFICANT CHANGE UP (ref 3.5–5.3)
PROT SERPL-MCNC: 4.4 GM/DL — LOW (ref 6–8.3)
PROT SERPL-MCNC: 4.6 GM/DL — LOW (ref 6–8.3)
PROTHROM AB SERPL-ACNC: 30 SEC — HIGH (ref 9.8–12.7)
RBC # BLD: 2.94 M/UL — LOW (ref 3.8–5.2)
RBC # FLD: 15 % — HIGH (ref 10.3–14.5)
SAO2 % BLDV: 98 % — HIGH (ref 67–88)
SODIUM SERPL-SCNC: 136 MMOL/L — SIGNIFICANT CHANGE UP (ref 135–145)
SODIUM SERPL-SCNC: 138 MMOL/L — SIGNIFICANT CHANGE UP (ref 135–145)
WBC # BLD: 24.3 K/UL — HIGH (ref 3.8–10.5)
WBC # FLD AUTO: 24.3 K/UL — HIGH (ref 3.8–10.5)

## 2017-11-17 PROCEDURE — 71010: CPT | Mod: 26

## 2017-11-17 RX ORDER — MICAFUNGIN SODIUM 100 MG/1
INJECTION, POWDER, LYOPHILIZED, FOR SOLUTION INTRAVENOUS
Qty: 0 | Refills: 0 | Status: DISCONTINUED | OUTPATIENT
Start: 2017-11-17 | End: 2017-11-18

## 2017-11-17 RX ORDER — CALCIUM GLUCONATE 100 MG/ML
2 VIAL (ML) INTRAVENOUS ONCE
Qty: 0 | Refills: 0 | Status: COMPLETED | OUTPATIENT
Start: 2017-11-17 | End: 2017-11-17

## 2017-11-17 RX ORDER — MICAFUNGIN SODIUM 100 MG/1
100 INJECTION, POWDER, LYOPHILIZED, FOR SOLUTION INTRAVENOUS EVERY 24 HOURS
Qty: 0 | Refills: 0 | Status: DISCONTINUED | OUTPATIENT
Start: 2017-11-18 | End: 2017-11-18

## 2017-11-17 RX ORDER — MICAFUNGIN SODIUM 100 MG/1
100 INJECTION, POWDER, LYOPHILIZED, FOR SOLUTION INTRAVENOUS ONCE
Qty: 0 | Refills: 0 | Status: COMPLETED | OUTPATIENT
Start: 2017-11-17 | End: 2017-11-17

## 2017-11-17 RX ADMIN — SODIUM CHLORIDE 100 MILLILITER(S): 9 INJECTION, SOLUTION INTRAVENOUS at 17:16

## 2017-11-17 RX ADMIN — Medication 1.7 MICROGRAM(S)/KG/MIN: at 06:48

## 2017-11-17 RX ADMIN — MEROPENEM 100 MILLIGRAM(S): 1 INJECTION INTRAVENOUS at 05:54

## 2017-11-17 RX ADMIN — CHLORHEXIDINE GLUCONATE 5 MILLILITER(S): 213 SOLUTION TOPICAL at 17:16

## 2017-11-17 RX ADMIN — PANTOPRAZOLE SODIUM 40 MILLIGRAM(S): 20 TABLET, DELAYED RELEASE ORAL at 12:45

## 2017-11-17 RX ADMIN — CHLORHEXIDINE GLUCONATE 5 MILLILITER(S): 213 SOLUTION TOPICAL at 05:54

## 2017-11-17 RX ADMIN — Medication 90 MICROGRAM(S): at 05:55

## 2017-11-17 RX ADMIN — MICAFUNGIN SODIUM 105 MILLIGRAM(S): 100 INJECTION, POWDER, LYOPHILIZED, FOR SOLUTION INTRAVENOUS at 12:18

## 2017-11-17 RX ADMIN — Medication 1.7 MICROGRAM(S)/KG/MIN: at 21:29

## 2017-11-17 RX ADMIN — Medication 400 GRAM(S): at 08:20

## 2017-11-17 RX ADMIN — Medication 400 GRAM(S): at 21:29

## 2017-11-17 RX ADMIN — Medication 81 MILLIGRAM(S): at 12:46

## 2017-11-17 RX ADMIN — Medication 200 GRAM(S): at 12:18

## 2017-11-17 NOTE — PROGRESS NOTE ADULT - PROBLEM SELECTOR PLAN 3
Patient on ventilator at FiO2: 40% and PEEP 5  ABG: Blood Gas Profile - Arterial (11.14.17 @ 18:07)    pH, Arterial: 7.40    pCO2, Arterial: 32 mmHg    pO2, Arterial: 70    HCO3, Arterial: 19 mmoL/L    Base Excess, Arterial: -5 mmol/L    Oxygen Saturation, Arterial: 92  CXR done 11/14 shows trace mall left pleural effusion with left basilar atelectasis

## 2017-11-17 NOTE — CONSULT NOTE ADULT - SUBJECTIVE AND OBJECTIVE BOX
66 y/o F PMHx COPD, CVA, HTN, Hypothyroid, pulm htn, CHF, on coumadin, asa, BIBA after being found unresponsive, found to have severe pancreatitis. Has been anuric since admission - renal called for evaluation.    Currently intubated with 40% FiO2, requiring pressor support. Anuric. Initially with severe acidosis, now relatively corrected.        PAST MEDICAL & SURGICAL HISTORY:  Hypothyroidism  Atheroma  COPD (chronic obstructive pulmonary disease)  Severe tricuspid regurgitation  Moderate to severe mitral regurgitation  Pulmonary hypertension  Obesity  CVA (cerebral vascular accident)  Diastolic congestive heart failure  Systolic heart failure  HTN (hypertension)  History of       MEDICATIONS  (STANDING):  aspirin  chewable 81 milliGRAM(s) Oral daily  calcium gluconate IVPB 2 Gram(s) IV Intermittent once  dextrose 5% 1000 milliLiter(s) (200 mL/Hr) IV Continuous <Continuous>  influenza   Vaccine 0.5 milliLiter(s) IntraMuscular once  levothyroxine 175 MICROGram(s) Oral daily  magnesium sulfate  IVPB 1 Gram(s) IV Intermittent once  meropenem IVPB 500 milliGRAM(s) IV Intermittent every 12 hours  norepinephrine Infusion 0.01 MICROgram(s)/kG/Min (1.701 mL/Hr) IV Continuous <Continuous>  pantoprazole  Injectable 40 milliGRAM(s) IV Push daily  propofol Infusion 5 MICROgram(s)/kG/Min (2.721 mL/Hr) IV Continuous <Continuous>  sodium chloride 0.9%. 1000 milliLiter(s) (100 mL/Hr) IV Continuous <Continuous>      Allergies    No Known Drug Allergies  ALLERGIC TO DOG/CAT/HORSE HAIR;PLANTS: SNEEZING,SCRATCHY THROAT,STUFFY NOSE,ITCHY EYES] reaction to [Animals] (Unknown)    Intolerances        SOCIAL HISTORY:  Unknown    FAMILY HISTORY:  No pertinent family history in first degree relatives      REVIEW OF SYSTEMS:  UTO since unresponsive    Vital Signs Last 24 Hrs  T(C): 38.2 (2017 09:00), Max: 38.9 (2017 06:00)  T(F): 100.7 (2017 09:00), Max: 102 (2017 06:00)  HR: 99 (2017 09:07) (91 - 121)  BP: 90/38 (2017 09:00) (86/36 - 139/99) - ON PRESSOR SUPPORT  BP(mean): 52 (2017 09:00) (46 - 107)  RR: 27 (2017 09:00) (14 - 31)  SpO2: 100% (2017 09:07) (96% - 100%)  I and O's:     @ 07:  -   @ 07:00  --------------------------------------------------------  IN: 8916.5 mL / OUT: 10 mL / NET: 8906.5 mL     @ 07: @ 10:03  --------------------------------------------------------  IN: 685 mL / OUT: 0 mL / NET: 685 mL          PHYSICAL EXAM:    Constitutional: intubated, unresponsive  HEENT: intubated with FiO2 40%  Neck: No LAD, No JVD  Respiratory: CTAB, no wheeze or crackles  Cardiovascular: S1 and S2  Gastrointestinal: BS+, soft, NT/ND, +rectal tube  Extremities: trace peripheral edema  Neurological: no response  Psychiatric: unresponsive  : +[place] with no output  Skin: No rashes  Access: Not applicable    LABS:                        10.4   15.3  )-----------( 303      ( 2017 05:24 )             33.0       143    |  108    |  64     ----------------------------<  108       2017 05:24  4.2     |  18     |  4.61     142    |  111    |  63     ----------------------------<  102       2017 23:55  4.2     |  19     |  4.23     144    |  115    |  58     ----------------------------<  73        2017 18:00  4.4     |  14     |  3.81     Ca    5.2        2017 05:24 - corrected 7.2  Ca    5.6        2017 23:55    Phos  3.5       2017 05:24  Phos  3.5       2017 18:00    Mg     1.4       2017 05:24  Mg     1.4       2017 18:00    TPro  4.2    /  Alb  1.4    /  TBili  2.2    /        2017 05:24  DBili  x      /  AST  1679   /  ALT  1393   /  AlkPhos  162      TPro  4.7    /  Alb  1.6    /  TBili  2.4    /        2017 23:55  DBili  x      /  AST  1968   /  ALT  1566   /  AlkPhos  190          Urine Studies:  Urinalysis Basic - ( 2017 17:05 )    Color: Deann / Appearance: very cloudy / S.025 / pH: x  Gluc: x / Ketone: Trace  / Bili: Moderate / Urobili: 4 mg/dL   Blood: x / Protein: 100 mg/dL / Nitrite: Positive   Leuk Esterase: Trace / RBC: 6-10 /HPF / WBC 0-2   Sq Epi: x / Non Sq Epi: Occasional / Bacteria: TNTC            RADIOLOGY & ADDITIONAL STUDIES:
HPI:  	  Patient is  66 y/o F PMHx COPD, CVA, HTN, Hypothyroid, pulm htn, CHF, on coumadin, asa, obesity admitted on  after being found at home on bathroom floor unresponsive; details unclear; history per medical record as patient is currently intubated, on pressor support and cannot provide history.          PMH: as above  PSH: as above  Meds: per reconcilation sheet, noted below  MEDICATIONS  (STANDING):  aspirin enteric coated 81 milliGRAM(s) Oral daily  dextrose 5% 1000 milliLiter(s) (100 mL/Hr) IV Continuous <Continuous>  influenza   Vaccine 0.5 milliLiter(s) IntraMuscular once  levothyroxine 175 MICROGram(s) Oral daily  meropenem IVPB 250 milliGRAM(s) IV Intermittent every 12 hours  norepinephrine Infusion 0.01 MICROgram(s)/kG/Min (1.701 mL/Hr) IV Continuous <Continuous>  pantoprazole  Injectable 40 milliGRAM(s) IV Push daily  phenylephrine    Infusion 0.5 MICROgram(s)/kG/Min (17.006 mL/Hr) IV Continuous <Continuous>  propofol Infusion 5 MICROgram(s)/kG/Min (2.721 mL/Hr) IV Continuous <Continuous>  propofol Infusion 5 MICROgram(s)/kG/Min (2.721 mL/Hr) IV Continuous <Continuous>  sodium bicarbonate  Infusion 0.124 mEq/kG/Hr (75 mL/Hr) IV Continuous <Continuous>  sodium chloride 0.9%. 1000 milliLiter(s) (125 mL/Hr) IV Continuous <Continuous>  sodium chloride 0.9%. 1000 milliLiter(s) (100 mL/Hr) IV Continuous <Continuous>    MEDICATIONS  (PRN):  acetaminophen    Suspension 650 milliGRAM(s) Oral every 6 hours PRN pain and temp  LORazepam   Injectable 1 milliGRAM(s) IV Push every 2 hours PRN Agitation    Allergies    No Known Drug Allergies  Originally Entered as [see Comment: &quot;&quot;ALLERGIC TO DOG/CAT/HORSE HAIR;PLANTS: SNEEZING,SCRATCHY THROAT,STUFFY NOSE,ITCHY EYES] reaction to [Animals] (Unknown)    Intolerances      Social: no smoking, no alcohol, no illegal drugs; no recent travel, no exposure to TB  FAMILY HISTORY:  No pertinent family history in first degree relatives    ROS: unable to obtain secondary to patient medical condition   Vital Signs Last 24 Hrs  T(C): 36.9 (2017 10:00), Max: 38.1 (2017 06:00)  T(F): 98.4 (2017 10:00), Max: 100.6 (2017 06:00)  HR: 95 (2017 11:30) (77 - 115)  BP: 127/60 (2017 11:30) (66/51 - 186/167)  BP(mean): 76 (2017 11:30) (33 - 107)  RR: 23 (2017 11:30) (6 - 34)  SpO2: 96% (2017 11:22) (75% - 100%)  Daily Height in cm: 177.8 (2017 16:09)    Daily   Constitutional: frail looking  HEENT: edema of face, intubated  Neck: supple  Respiratory: diminished breath sounds with scattered coarse breath sounds  Cardiovascular: S1S2 regular, no murmurs  Abdomen: soft, not tender, not distended, positive BS  Genitourinary: deferred  Rectal: deferred  Musculoskeletal: no muscle tenderness, no joint swelling or tenderness  Neurological: intubated  Skin: no rashes                          11.9   14.4  )-----------( 405      ( 2017 06:09 )             36.8     11-13    145  |  116<H>  |  51<H>  ----------------------------<  77  4.0   |  15<L>  |  3.03<H>    Ca    5.8<LL>      2017 06:09    TPro  5.4<L>  /  Alb  2.2<L>  /  TBili  1.9<H>  /  DBili  x   /  AST  280<H>  /  ALT  248<H>  /  AlkPhos  236<H>  11-12     LIVER FUNCTIONS - ( 2017 17:33 )  Alb: 2.2 g/dL / Pro: 5.4 gm/dL / ALK PHOS: 236 U/L / ALT: 248 U/L / AST: 280 U/L / GGT: x           Urinalysis Basic - ( 2017 17:05 )    Color: Deann / Appearance: very cloudy / S.025 / pH: x  Gluc: x / Ketone: Trace  / Bili: Moderate / Urobili: 4 mg/dL   Blood: x / Protein: 100 mg/dL / Nitrite: Positive   Leuk Esterase: Trace / RBC: 6-10 /HPF / WBC 0-2   Sq Epi: x / Non Sq Epi: Occasional / Bacteria: TNTC    Lipase, Serum (17 @ 17:33)    Lipase, Serum: >46791 U/L    Lactate, Blood (17 @ 06:09)    Lactate, Blood: 3.2 mmol/L          Radiology:< from: CT Abdomen and Pelvis No Cont (17 @ 18:52) >    EXAM:  CT ABDOMEN AND PELVIS                          EXAM:  CT CHEST                            PROCEDURE DATE:  2017          INTERPRETATION:  CLINICAL INFORMATION: Elevated lipase level and elevated   lactate. Questionable fall.    COMPARISON: None    PROCEDURE:   CT of the Chest, Abdomen and Pelvis was performed without intravenous   contrast.   Intravenous contrast: None.  Oral contrast: None.  Sagittal and coronal reformats were performed.    FINDINGS:    CHEST:     LUNGS AND LARGE AIRWAYS: ET tube in good position.. Multiple right-sided   pulmonary nodules are seen. To right middle lobe nodules measure 7 mm.   Right lower lobe nodule measures 5 mm. 3 mm right lower lobe nodule is   seen. Additional tiny nodules in the right lower lobe are noted. Complete   atelectasis of the left lower lobe. Dependent atelectasis in the right   lower lobe.  Back PLEURA: No pleural effusion.  VESSELS: Within normal limits.  HEART: Heart size is normal.No pericardial effusion.  MEDIASTINUM AND MARI: No lymphadenopathy.  CHEST WALL AND LOWER NECK: Within normal limits.    ABDOMEN AND PELVIS: Study is limited by lack of intravenous contrast.    LIVER: Within normal limits.  BILE DUCTS: Normal caliber.  GALLBLADDER: Cholelithiasis.  SPLEEN: Within normal limits.  PANCREAS: There is diffuse enlargement of the pancreas with several areas   of high density in an around the pancreatic head concerning for   hemorrhage. Extensive peripancreatic inflammation is noted.   ADRENALS: Within normal limits.  KIDNEYS/URETERS: Bilateral renal cysts .    BLADDER: Collapsed around a fully catheter balloon.  REPRODUCTIVE ORGANS: No pelvic masses.    BOWEL: Rectal catheter in place No bowel obstruction. Sigmoid   diverticulosis. Normal appendix. Fluid-filled proximal colon. Thickened   loops of jejunum are likely reactive to the peripancreatic inflammation.  PERITONEUM/RETROPERITONEUM: Mild hyperdensity abdominopelvic ascites.  VESSELS: Atherosclerotic vascular calcifications.    ABDOMINAL WALL: Lipoma in distal left iliopsoas.  BONES: Degenerative changes of the spine.    IMPRESSION:    Redemonstration of right lung nodules measuring up to 7 mm.    Complete left lower lobe atelectasis.    Diffuse pancreatic enlargement with peripancreatic fluid, some of which   is hemorrhagic. Findings concerning for hemorrhagic pancreatitis.   Evaluation is limited by lack of intravenous contrast.    Reactive jejunitis.    < end of copied text >      Advanced directive addressed: DNR
HPI:    · HPI Objective Statement: 66 y/o F PMHx COPD, CVA, HTN, Hypothyroid, pulm htn, CHF, on coumadin, asa, presents to the ED s/p unresponsive. The pt was BIBA after being found unresponsive in her bathroom while having a bowel movement.    Labs were c/w pancretitis (Lipase > 30 K), CT c/w acute hemorrhagic pancreatitis. Patient on coumadin. Patient now intubated and anuric, with rising creatinine.    PAST MEDICAL/SURGICAL/FAMILY/SOCIAL HISTORY:    Past Medical History:  Atheroma    COPD (chronic obstructive pulmonary disease)    CVA (cerebral vascular accident)    Diastolic congestive heart failure    HTN (hypertension)    Hypothyroidism    Moderate to severe mitral regurgitation    Obesity    Pulmonary hypertension    Severe tricuspid regurgitation    Systolic heart failure.    ICU Vital Signs Last 24 Hrs  T(C): 37.2 (2017 18:30), Max: 37.9 (2017 16:45)  T(F): 98.9 (2017 18:30), Max: 100.2 (2017 16:45)  HR: 77 (2017 18:30) (77 - 115)  BP: 104/68 (2017 18:30) (66/51 - 186/167)  BP(mean): --  ABP: --  ABP(mean): --  RR: 16 (2017 18:30) (6 - 19)  SpO2: 100% (2017 18:30) (75% - 100%) (2017 20:13)      PAST MEDICAL & SURGICAL HISTORY:  Hypothyroidism  Atheroma  COPD (chronic obstructive pulmonary disease)  Severe tricuspid regurgitation  Moderate to severe mitral regurgitation  Pulmonary hypertension  Obesity  CVA (cerebral vascular accident)  Diastolic congestive heart failure  Systolic heart failure  HTN (hypertension)  History of       MEDICATIONS  (STANDING):  aspirin  chewable 81 milliGRAM(s) Oral daily  dextrose 5% 1000 milliLiter(s) (100 mL/Hr) IV Continuous <Continuous>  influenza   Vaccine 0.5 milliLiter(s) IntraMuscular once  levothyroxine 175 MICROGram(s) Oral daily  meropenem IVPB 500 milliGRAM(s) IV Intermittent every 12 hours  norepinephrine Infusion 0.01 MICROgram(s)/kG/Min (1.701 mL/Hr) IV Continuous <Continuous>  pantoprazole  Injectable 40 milliGRAM(s) IV Push daily  phenylephrine    Infusion 0.5 MICROgram(s)/kG/Min (17.006 mL/Hr) IV Continuous <Continuous>  propofol Infusion 5 MICROgram(s)/kG/Min (2.721 mL/Hr) IV Continuous <Continuous>  sodium chloride 0.9% Bolus 1000 milliLiter(s) IV Bolus once  sodium chloride 0.9%. 1000 milliLiter(s) (100 mL/Hr) IV Continuous <Continuous>    MEDICATIONS  (PRN):  acetaminophen    Suspension 650 milliGRAM(s) Oral every 6 hours PRN pain and temp  LORazepam   Injectable 1 milliGRAM(s) IV Push every 2 hours PRN Agitation      Allergies    No Known Drug Allergies  Originally Entered as [see Comment: &quot;&quot;ALLERGIC TO DOG/CAT/HORSE HAIR;PLANTS: SNEEZING,SCRATCHY THROAT,STUFFY NOSE,ITCHY EYES] reaction to [Animals] (Unknown)    Intolerances        SOCIAL HISTORY:  unobtainable    FAMILY HISTORY:  No pertinent family history in first degree relatives      ROS  unobtainable    Vital Signs Last 24 Hrs  T(C): 38.8 (2017 14:00), Max: 38.8 (2017 14:00)  T(F): 101.9 (2017 14:00), Max: 101.9 (2017 14:00)  HR: 106 (2017 17:12) (77 - 110)  BP: 125/46 (2017 16:00) (66/51 - 139/99)  BP(mean): 63 (2017 16:00) (33 - 107)  RR: 28 (2017 16:00) (14 - 34)  SpO2: 99% (2017 17:12) (96% - 100%)    Constitutional: Intubated  Respiratory: Clear anteriorily  Cardiovascular: S1 and S2, RRR  Gastrointestinal: BS+, soft, NT/ND  Extremities: No peripheral edema  Psychiatric: Intubated  Skin: No rashes    LABS:                        11.9   14.4  )-----------( 405      ( 2017 06:09 )             36.8     11-13    145  |  116<H>  |  51<H>  ----------------------------<  77  4.0   |  15<L>  |  3.03<H>    Ca    5.8<LL>      2017 06:09    TPro  5.4<L>  /  Alb  2.2<L>  /  TBili  1.9<H>  /  DBili  x   /  AST  280<H>  /  ALT  248<H>  /  AlkPhos  236<H>      PT/INR - ( 2017 08:57 )   PT: 79.3 sec;   INR: 7.06 ratio         PTT - ( 2017 17:09 )  PTT:38.6 sec  LIVER FUNCTIONS - ( 2017 17:33 )  Alb: 2.2 g/dL / Pro: 5.4 gm/dL / ALK PHOS: 236 U/L / ALT: 248 U/L / AST: 280 U/L / GGT: x
Patient is  66 y/o F PMHx COPD, CVA, HTN, Hypothyroid, pulm htn, CHF, on coumadin, asa, obesity admitted on 11/12 after being found at home on bathroom floor unresponsive; details unclear; history per medical record as patient is currently intubated, on pressor support and cannot provide history. CT scan and MRCP both shwed hemorrhagic pancreatitis. There was no evidence of CBD stones. I have been asked to evaluate her regarding role of surgical intervention.    PMH: as above  MEDICATIONS  (STANDING):  aspirin  chewable 81 milliGRAM(s) Oral daily  calcium gluconate IVPB 2 Gram(s) IV Intermittent once  chlorhexidine 0.12% Liquid 5 milliLiter(s) Swish and Spit two times a day  dextrose 5% + sodium chloride 0.9%. 1000 milliLiter(s) (100 mL/Hr) IV Continuous <Continuous>  dextrose 5%. 1000 milliLiter(s) (50 mL/Hr) IV Continuous <Continuous>  dextrose 50% Injectable 12.5 Gram(s) IV Push once  dextrose 50% Injectable 25 Gram(s) IV Push once  dextrose 50% Injectable 25 Gram(s) IV Push once  influenza   Vaccine 0.5 milliLiter(s) IntraMuscular once  levothyroxine Injectable 90 MICROGram(s) IV Push daily  meropenem IVPB 500 milliGRAM(s) IV Intermittent daily  micafungin IVPB      micafungin IVPB 100 milliGRAM(s) IV Intermittent once  norepinephrine Infusion 0.01 MICROgram(s)/kG/Min (1.701 mL/Hr) IV Continuous <Continuous>  pantoprazole  Injectable 40 milliGRAM(s) IV Push daily  vasopressin Infusion 0.04 Unit(s)/Min (2.4 mL/Hr) IV Continuous <Continuous>    MEDICATIONS  (PRN):  acetaminophen    Suspension 650 milliGRAM(s) Oral every 6 hours PRN pain and temp  dextrose Gel 1 Dose(s) Oral once PRN Blood Glucose LESS THAN 70 milliGRAM(s)/deciLiter  glucagon  Injectable 1 milliGRAM(s) IntraMuscular once PRN Glucose <70 milliGRAM(s)/deciLiter  LORazepam   Injectable 1 milliGRAM(s) IV Push every 2 hours PRN Agitation    ROS- unobtainable    PE- ICU Vital Signs Last 24 Hrs  T(C): 37.3 (17 Nov 2017 08:00), Max: 37.9 (16 Nov 2017 22:00)  T(F): 99.1 (17 Nov 2017 08:00), Max: 100.3 (16 Nov 2017 22:00)  HR: 85 (17 Nov 2017 11:30) (71 - 89)  BP: 128/52 (17 Nov 2017 11:30) (95/43 - 162/57)  BP(mean): 77 (17 Nov 2017 11:30) (54 - 81)  ABP: --  ABP(mean): --  RR: 22 (17 Nov 2017 11:30) (13 - 23)  SpO2: 100% (17 Nov 2017 11:30) (99% - 100%)   On vent, unresponsive  Lungs- clear  Cor- RRR  Abd- distended, few BS soft  Ext- no edema             8.6    24.3  )-----------( 146      ( 17 Nov 2017 05:04 )             25.5       11-17    136  |  101  |  87<H>  ----------------------------<  107<H>  4.2   |  23  |  6.07<H>    Ca    5.4<LL>      17 Nov 2017 10:32  Phos  7.5     11-17  Mg     1.8     11-17    TPro  4.6<L>  /  Alb  1.0<L>  /  TBili  2.1<H>  /  DBili  x   /  AST  506<H>  /  ALT  403<H>  /  AlkPhos  115  11-17

## 2017-11-17 NOTE — PROVIDER CONTACT NOTE (CRITICAL VALUE NOTIFICATION) - TEST AND RESULT REPORTED:
Ca 5.1
Calcium less than 3.
Calcium: 5.5
INR = 7.06
Lactate 5.8
PT 57.3  INR  5.13
blood culture (11/12/17) prelim. - growth in aerobic bottle Gram positive cocci in clusters
calcium 5.5
calcium 5.5
calcium 5.6
calcium<5
iodized calcium-0.68
lactate 4.1
lactate 8.4  troponin 0.088  lipase >30,000  calcium 6.8  inr 5.0
Ca 5.6

## 2017-11-17 NOTE — PROGRESS NOTE ADULT - PROBLEM SELECTOR PLAN 1
CT scan abdomen shows: severe hemorrhagic pancreatitis with multiple acute peripancreatic fluid collection .Multiple gallstones without choledocholithisasis.   - maintain NPO  - WBC 24.3  - MRCP done does not show any stones/ biliary dilation  - continue meropenem  -continue micrafungin  - continue NSD5W at 100ml/hr  - consider CT scan abdomen with contrast  - surgical opinion for debridement/ percutaneous drainage

## 2017-11-17 NOTE — PROVIDER CONTACT NOTE (CRITICAL VALUE NOTIFICATION) - PERSON GIVING RESULT:
Ankur/lab
CATRACHITA Castellanos, lab
Jairon/марина
Juan F Larsen, lab
LAB, Ankur 
Lab
Lab
Lawerence/ lab
Neo / Aurea
VONDA Bronson/ Laboratory
jose m richey/ марина
lab Videtto
lab, Delphine Suárez
liudmila/марина
Tia, lab

## 2017-11-17 NOTE — CONSULT NOTE ADULT - ASSESSMENT
68 y/o F PMHx COPD, CVA, HTN, Hypothyroid, pulm htn, CHF, on coumadin, asa, BIBA after being found unresponsive, found to have severe pancreatitis. Has been anuric since admission - renal called for evaluation.    NATHALIE / anuric / renal failure  - due to severe pancreatitis  - receiving resuscitation with bicarb drip + NS - cont  - pressor support to maintain MAP -- currently on Levophed  - will be difficult to regain renal function while on pressors due to vascular constriction - monitor / wean off pressors as tolerated  - remains anuric despite volume resuscitation - monitor  - currently all lytes in range, acidosis improving, no hyperkalemia, lungs clear and oxygenating well on FiO2 40%  - no acute need for dialysis today but will assess daily -- dialysis will be difficult in setting of pressor support -- will require likely increase in pressor dosing to maintain BP while being dialyzed but can anticipate hemodynamic instability / hypotension   - will assess daily for dialysis need. If no significant improvement in hemodynamics / blood pressure / urine output, will likely need HD initiation in next 48-72 hours.    Condition critical.  Case d/w Dr. Naidu.    Dr. Cristina to cover service 11/15. Will resume care 11/16.
Patient is  68 y/o F PMHx COPD, CVA, HTN, Hypothyroid, pulm htn, CHF, on coumadin, asa, obesity admitted on 11/12 after being found at home on bathroom floor unresponsive; details unclear; history per medical record as patient is currently intubated, on pressor support and cannot provide history.  1. Patient admitted with septic shock, possibly secondary to hemorrhagic pancreatitis, versus sepsis of urinary origin; acute acidosis  - ventilatory support per icu  - follow up cultures   - iv hydration and supportive care   - pressor support per icu  - will optimize antibiotics to meropenem given the hemorrhagic pancreatitis, this will cover the urine as well; versus early pneumonia  - serial cbc and monitor temperature   2. other issues; COPD, CVA, HTN, Hypothyroid, pulm htn, CHF, on coumadin, asa, obesity  - per medicine
Hemorrhagic pancreatitis secondary to gallstone pancreatitis. Multi system organ dysfunction. Would consider current ICU care and hopefully kidney function will improve. Would hold off on IV contrast CT scan. If status does not improve further, consideration could be given to ultrasound or non contrast CT guided aspiration and gram stain/culture of peripancreatic fluid. Consideration should also be given to nutritional support, either with enteral feeds if a tip can be positioned beyond ligament of Treitz, or TPN. Will follow . Thank you.
Imp:  Severe gallstone pancreatitis  Unclear if there is a stone in the CBD now    Rec:  Bolus fluids and increase fluids to promote urinary output  NPO  Consider MRCP in AM once overall hemodynamics improved  Follow mag Baptiste phos carefully and replace

## 2017-11-17 NOTE — PROVIDER CONTACT NOTE (CRITICAL VALUE NOTIFICATION) - NAME OF MD/NP/PA/DO NOTIFIED:
Dr Anthony, message
Dr Anthony
Dr Gordon
Dr Gordon
Dr Kelly
Dr. Anthony
Dr. Gordon
Dr. Nadiu
Dr. Naidu
joe/brent
Dr. Anthony

## 2017-11-18 VITALS — HEART RATE: 77 BPM | RESPIRATION RATE: 19 BRPM

## 2017-11-18 LAB
ALBUMIN SERPL ELPH-MCNC: 1 G/DL — SIGNIFICANT CHANGE UP (ref 3.3–5)
ALP SERPL-CCNC: 118 U/L — SIGNIFICANT CHANGE UP (ref 40–120)
ALT FLD-CCNC: 239 U/L — HIGH (ref 12–78)
AST SERPL-CCNC: 394 U/L — HIGH (ref 15–37)
BASE EXCESS BLDA CALC-SCNC: -8 MMOL/L — LOW (ref -2–2)
BILIRUB SERPL-MCNC: 2.4 MG/DL — SIGNIFICANT CHANGE UP (ref 0.2–1.2)
BLOOD GAS COMMENTS ARTERIAL: SIGNIFICANT CHANGE UP
BUN SERPL-MCNC: 93 MG/DL — SIGNIFICANT CHANGE UP (ref 7–23)
CALCIUM SERPL-MCNC: 6 MG/DL — SIGNIFICANT CHANGE UP (ref 8.5–10.1)
CHLORIDE SERPL-SCNC: 104 MMOL/L — SIGNIFICANT CHANGE UP (ref 96–108)
CO2 SERPL-SCNC: 19 MMOL/L — SIGNIFICANT CHANGE UP (ref 22–31)
CREAT SERPL-MCNC: 6.09 MG/DL — SIGNIFICANT CHANGE UP (ref 0.5–1.3)
CULTURE RESULTS: SIGNIFICANT CHANGE UP
GAS PNL BLDA: SIGNIFICANT CHANGE UP
GLUCOSE BLDC GLUCOMTR-MCNC: 106 MG/DL — HIGH (ref 70–99)
GLUCOSE SERPL-MCNC: 96 MG/DL — SIGNIFICANT CHANGE UP (ref 70–99)
HCO3 BLDA-SCNC: 19 MMOL/L — LOW (ref 21–29)
HCT VFR BLD CALC: 24 % — LOW (ref 34.5–45)
HGB BLD-MCNC: 7.9 G/DL — LOW (ref 11.5–15.5)
HOROWITZ INDEX BLDA+IHG-RTO: 40 — SIGNIFICANT CHANGE UP
INR BLD: 5.6 RATIO — CRITICAL HIGH (ref 0.88–1.16)
MCHC RBC-ENTMCNC: 28.4 PG — SIGNIFICANT CHANGE UP (ref 27–34)
MCHC RBC-ENTMCNC: 32.8 GM/DL — SIGNIFICANT CHANGE UP (ref 32–36)
MCV RBC AUTO: 86.6 FL — SIGNIFICANT CHANGE UP (ref 80–100)
PCO2 BLDA: 46 MMHG — SIGNIFICANT CHANGE UP (ref 32–46)
PH BLDA: 7.24 — LOW (ref 7.35–7.45)
PLATELET # BLD AUTO: 181 K/UL — SIGNIFICANT CHANGE UP (ref 150–400)
PO2 BLDA: 74 — SIGNIFICANT CHANGE UP
POTASSIUM SERPL-MCNC: 4.4 MMOL/L — SIGNIFICANT CHANGE UP (ref 3.5–5.3)
POTASSIUM SERPL-SCNC: 4.4 MMOL/L — SIGNIFICANT CHANGE UP (ref 3.5–5.3)
PROT SERPL-MCNC: 4.5 GM/DL — SIGNIFICANT CHANGE UP (ref 6–8.3)
PROTHROM AB SERPL-ACNC: 62.7 SEC — HIGH (ref 9.8–12.7)
RBC # BLD: 2.78 M/UL — LOW (ref 3.8–5.2)
RBC # FLD: 14.7 % — HIGH (ref 10.3–14.5)
SAO2 % BLDA: 90 — SIGNIFICANT CHANGE UP
SODIUM SERPL-SCNC: 140 MMOL/L — SIGNIFICANT CHANGE UP (ref 135–145)
SPECIMEN SOURCE: SIGNIFICANT CHANGE UP
WBC # BLD: 34.4 K/UL — HIGH (ref 3.8–10.5)
WBC # FLD AUTO: 34.4 K/UL — HIGH (ref 3.8–10.5)

## 2017-11-18 PROCEDURE — 71010: CPT | Mod: 26

## 2017-11-18 PROCEDURE — 74176 CT ABD & PELVIS W/O CONTRAST: CPT | Mod: 26

## 2017-11-18 PROCEDURE — 70450 CT HEAD/BRAIN W/O DYE: CPT | Mod: 26

## 2017-11-18 RX ORDER — HYDROMORPHONE HYDROCHLORIDE 2 MG/ML
0.5 INJECTION INTRAMUSCULAR; INTRAVENOUS; SUBCUTANEOUS
Qty: 0 | Refills: 0 | Status: DISCONTINUED | OUTPATIENT
Start: 2017-11-18 | End: 2017-11-18

## 2017-11-18 RX ORDER — HYDROMORPHONE HYDROCHLORIDE 2 MG/ML
0.5 INJECTION INTRAMUSCULAR; INTRAVENOUS; SUBCUTANEOUS
Qty: 100 | Refills: 0 | Status: DISCONTINUED | OUTPATIENT
Start: 2017-11-18 | End: 2017-11-18

## 2017-11-18 RX ORDER — HYDROMORPHONE HYDROCHLORIDE 2 MG/ML
0.5 INJECTION INTRAMUSCULAR; INTRAVENOUS; SUBCUTANEOUS
Qty: 10 | Refills: 0 | Status: DISCONTINUED | OUTPATIENT
Start: 2017-11-18 | End: 2017-11-18

## 2017-11-18 RX ORDER — PHYTONADIONE (VIT K1) 5 MG
2.5 TABLET ORAL ONCE
Qty: 0 | Refills: 0 | Status: COMPLETED | OUTPATIENT
Start: 2017-11-18 | End: 2017-11-18

## 2017-11-18 RX ADMIN — Medication 2.5 MILLIGRAM(S): at 09:47

## 2017-11-18 RX ADMIN — HYDROMORPHONE HYDROCHLORIDE 2.5 MG/HR: 2 INJECTION INTRAMUSCULAR; INTRAVENOUS; SUBCUTANEOUS at 14:07

## 2017-11-18 RX ADMIN — HYDROMORPHONE HYDROCHLORIDE 0.5 MILLIGRAM(S): 2 INJECTION INTRAMUSCULAR; INTRAVENOUS; SUBCUTANEOUS at 14:07

## 2017-11-18 RX ADMIN — CHLORHEXIDINE GLUCONATE 5 MILLILITER(S): 213 SOLUTION TOPICAL at 05:39

## 2017-11-18 RX ADMIN — Medication 90 MICROGRAM(S): at 05:38

## 2017-11-18 RX ADMIN — MEROPENEM 100 MILLIGRAM(S): 1 INJECTION INTRAVENOUS at 05:39

## 2017-11-18 RX ADMIN — MICAFUNGIN SODIUM 105 MILLIGRAM(S): 100 INJECTION, POWDER, LYOPHILIZED, FOR SOLUTION INTRAVENOUS at 10:11

## 2017-11-18 RX ADMIN — SODIUM CHLORIDE 100 MILLILITER(S): 9 INJECTION, SOLUTION INTRAVENOUS at 10:10

## 2017-11-18 RX ADMIN — SODIUM CHLORIDE 100 MILLILITER(S): 9 INJECTION, SOLUTION INTRAVENOUS at 03:11

## 2017-11-18 NOTE — PROGRESS NOTE ADULT - PROVIDER SPECIALTY LIST ADULT
Critical Care
Gastroenterology
Infectious Disease
Nephrology
Critical Care

## 2017-11-18 NOTE — PROGRESS NOTE ADULT - SUBJECTIVE AND OBJECTIVE BOX
67y Female complaining of unresponsive.	  · Unable to Obtain: Unresponsive	  · Details: Unresponsive	  · HPI Objective Statement: 68 y/o F PMHx COPD, CVA, HTN, Hypothyroid, pulm htn, CHF, on coumadin, asa, presents to the ED s/p unresponsive. The pt was BIBA after being found unresponsive in her bathroom while having a bowel movement. Further history unavailable as pt is unresponsive.	    above d.w Dr Anthony who initially evaluated and treated the patient.    Admitted with diagnosis of severe pancreatitis - intubated, on pressor for support of MAP.    pt seen initially at 8am and then hourly over the course of the day - d/w charge and bedside RN's, D/w Dr Bailey and Dr Marin.    Empiric abx changed to meropenem    Pt anuric despite aggressive hydration and NaHCO3 infusion - remains on levophed infusion and vent support.    Coagulopathy due to coumadin also noted a treated with vitamin K 2.5mg PO x 1 - no evidence of bleeding at this time.    Pt off sedation but severely encephalopathic.    Pt daughter at bedside at 18:30 case discussed in detail including poor prognosis, current issues and plan for further care.    11/14: still anuric despite IVF and NaHCO3 infusion - requiring less pressors but still remains critically ill on vent support - remains encephalopathic.    Case again discussed in detail with pts daughter and with pts sister at bedside - all questions answered and severity of illness plainly explained - also discussed likely need fo HD initiation on 11/15 if no significant improvement on urine output.    notably pt with coag neg staph in 1 blood culture from admission.    addendum: pt with drop in SBP necessitating addition of 2nd pressor (vasopressin infusion)    11/15: no sig overnight events - pt did make some urine - 90 ml overnight and 100ml so far today.  No acute indication for HD as d/w nephrology.  D.w GI this am and will get MRCP to assess for panc stones - d/w pts daughter via telephone and issues/plans discussed as well as need for MRCP - she is agreeable and notes that despite cardiac stents, pt had MRI of her shoulder recently without issue.    Pt remains on 2 pressors.  Intubated.  Remains encephalopathic but starting to move arms and legs spont and in response to nox stim.    11/16: MRCP overnight did not demonstrate any pancreatic stones/pancreatic duct stones - pt remains critically ill on 2 pressors - still with scant urine output but no acute indication for HD.  D/w Dr Jurado.  D/w pts daughter Jing at bedside and all questions answered.  persistent low grade fever and leukocytosis noted.      11/17: slightly lower dose of levophed today - made approx 325ml urine overnight.  No acute indication for HD.  D/w Renal, GI, Surgery, ID consultants.  Remains encephalopathic - Ammonia WNL.          Allergies    No Known Drug Allergies  Originally Entered as [see Comment: &quot;&quot;ALLERGIC TO DOG/CAT/HORSE HAIR;PLANTS: SNEEZING,SCRATCHY THROAT,STUFFY NOSE,ITCHY EYES] reaction to [Animals] (Unknown)    ICU Vital Signs Last 24 Hrs  T(C): 37.3 (17 Nov 2017 08:00), Max: 37.9 (16 Nov 2017 22:00)  T(F): 99.1 (17 Nov 2017 08:00), Max: 100.3 (16 Nov 2017 22:00)  HR: 85 (17 Nov 2017 11:30) (71 - 89)  BP: 128/52 (17 Nov 2017 11:30) (95/43 - 162/57)  BP(mean): 77 (17 Nov 2017 11:30) (54 - 81)  ABP: --  ABP(mean): --  RR: 22 (17 Nov 2017 11:30) (13 - 23)  SpO2: 100% (17 Nov 2017 11:30) (99% - 100%)      Mode: AC/ CMV (Assist Control/ Continuous Mandatory Ventilation)  RR (machine): 12  TV (machine): 500  FiO2: 40  PEEP: 5  ITime: 1  PIP: 31      I&O's Summary    16 Nov 2017 07:01  -  17 Nov 2017 07:00  --------------------------------------------------------  IN: 3363 mL / OUT: 2413 mL / NET: 950 mL    17 Nov 2017 07:01  -  17 Nov 2017 12:08  --------------------------------------------------------  IN: 478 mL / OUT: 130 mL / NET: 348 mL                              8.6    24.3  )-----------( 146      ( 17 Nov 2017 05:04 )             25.5       11-17    136  |  101  |  87<H>  ----------------------------<  107<H>  4.2   |  23  |  6.07<H>    Ca    5.4<LL>      17 Nov 2017 10:32  Phos  7.5     11-17  Mg     1.8     11-17    TPro  4.6<L>  /  Alb  1.0<L>  /  TBili  2.1<H>  /  DBili  x   /  AST  506<H>  /  ALT  403<H>  /  AlkPhos  115  11-17      CAPILLARY BLOOD GLUCOSE      POCT Blood Glucose.: 99 mg/dL (17 Nov 2017 08:53)  POCT Blood Glucose.: 101 mg/dL (17 Nov 2017 03:50)  POCT Blood Glucose.: 107 mg/dL (17 Nov 2017 03:49)  POCT Blood Glucose.: 67 mg/dL (17 Nov 2017 03:47)  POCT Blood Glucose.: 81 mg/dL (16 Nov 2017 22:05)  POCT Blood Glucose.: 160 mg/dL (16 Nov 2017 19:28)  POCT Blood Glucose.: 198 mg/dL (16 Nov 2017 18:32)  POCT Blood Glucose.: 54 mg/dL (16 Nov 2017 17:53)  POCT Blood Glucose.: 50 mg/dL (16 Nov 2017 16:59)  POCT Blood Glucose.: 69 mg/dL (16 Nov 2017 15:51)      LIVER FUNCTIONS - ( 17 Nov 2017 05:04 )  Alb: 1.0 g/dL / Pro: 4.6 gm/dL / ALK PHOS: 115 U/L / ALT: 403 U/L / AST: 506 U/L / GGT: x           PT/INR - ( 17 Nov 2017 05:04 )   PT: 30.0 sec;   INR: 2.72 ratio        MEDICATIONS  (STANDING):  aspirin  chewable 81 milliGRAM(s) Oral daily  calcium gluconate IVPB 2 Gram(s) IV Intermittent once  chlorhexidine 0.12% Liquid 5 milliLiter(s) Swish and Spit two times a day  dextrose 5% + sodium chloride 0.9%. 1000 milliLiter(s) (100 mL/Hr) IV Continuous <Continuous>  dextrose 5%. 1000 milliLiter(s) (50 mL/Hr) IV Continuous <Continuous>  dextrose 50% Injectable 12.5 Gram(s) IV Push once  dextrose 50% Injectable 25 Gram(s) IV Push once  dextrose 50% Injectable 25 Gram(s) IV Push once  influenza   Vaccine 0.5 milliLiter(s) IntraMuscular once  levothyroxine Injectable 90 MICROGram(s) IV Push daily  meropenem IVPB 500 milliGRAM(s) IV Intermittent daily  micafungin IVPB      micafungin IVPB 100 milliGRAM(s) IV Intermittent once  norepinephrine Infusion 0.01 MICROgram(s)/kG/Min (1.701 mL/Hr) IV Continuous <Continuous>  pantoprazole  Injectable 40 milliGRAM(s) IV Push daily  vasopressin Infusion 0.04 Unit(s)/Min (2.4 mL/Hr) IV Continuous <Continuous>    MEDICATIONS  (PRN):  acetaminophen    Suspension 650 milliGRAM(s) Oral every 6 hours PRN pain and temp  dextrose Gel 1 Dose(s) Oral once PRN Blood Glucose LESS THAN 70 milliGRAM(s)/deciLiter  glucagon  Injectable 1 milliGRAM(s) IntraMuscular once PRN Glucose <70 milliGRAM(s)/deciLiter  LORazepam   Injectable 1 milliGRAM(s) IV Push every 2 hours PRN Agitation    Review of Systems:   · Additional ROS	Encephalopathic	    Physical Exam:   · Constitutional	detailed exam	  · Constitutional Details	obese, no distress	  · Eyes	detailed exam	  · Eyes Details	PERRL	  · Neck	detailed exam	  · Neck Details	supple; no JVD	  · Back	detailed exam	  · Back Details	normal shape	  · Respiratory	detailed exam	  · Respiratory Details	airway patent; breath sounds equal; good air movement	  · Respiratory Comments	orally intubated	  · Cardiovascular	detailed exam	  · Cardiovascular Details	irregular rate and rhythm	  · Gastrointestinal	detailed exam	  · GI Normal	soft	  · GI Abnormal	distended  bowel sounds hypoactive	  · Genitourinary	detailed exam	  · Genitourinary Comments	porter catheter in situ	  · Extremities	detailed exam	  · Extremities Details	no cyanosis; pedal edema	  · Pedal Edema Severity	1+	  · Vascular	Equal and normal pulses (carotid, femoral, dorsalis pedis)	  · Skin	detailed exam	  · Skin Details	warm and dry	  · Musculoskeletal	detailed exam	  · Musculoskeletal Details	normal strength	      DVT Prophylaxis:    Advanced Directives: DNR  Discussed with: HCP/Daughter    Visit Information: Critical care time 60 min    ** Time is exclusive of billed procedures and/or teaching and/or routine family updates.
CHIEF COMPLAINT:    HPI: 68 y/o F PMHx COPD, CVA, HTN, Hypothyroid, pulm htn, CHF, on coumadin, asa, BIBA after being found unresponsive secondary to acute hemorrhagic pancreatitis. Was intubated and on pressor support on norepinephrine and vasopressin. She continues to have elevated creatinine with anuria,  high WBC count, recurrent hypoglycemia , hypocalcemia. She also had increasing INR and given vitamin K.  Occasionally spiked fevers.   She continues to have altered mental status but is moving her extremities.         Vital Signs Last 24 Hrs  T(C): 37.2 (17 Nov 2017 06:00), Max: 37.9 (16 Nov 2017 22:00)  T(F): 98.9 (17 Nov 2017 06:00), Max: 100.3 (16 Nov 2017 22:00)  HR: 81 (17 Nov 2017 09:30) (71 - 90)  BP: 128/57 (17 Nov 2017 09:30) (95/43 - 162/57)  BP(mean): 80 (17 Nov 2017 09:30) (54 - 81)  RR: 18 (17 Nov 2017 09:30) (13 - 23)  SpO2: 100% (17 Nov 2017 09:30) (99% - 100%)    I&O's Summary    16 Nov 2017 07:01  -  17 Nov 2017 07:00  --------------------------------------------------------  IN: 3363 mL / OUT: 2413 mL / NET: 950 mL    17 Nov 2017 07:01  -  17 Nov 2017 10:15  --------------------------------------------------------  IN: 139 mL / OUT: 80 mL / NET: 59 mL        CAPILLARY BLOOD GLUCOSE      POCT Blood Glucose.: 99 mg/dL (17 Nov 2017 08:53)  POCT Blood Glucose.: 101 mg/dL (17 Nov 2017 03:50)  POCT Blood Glucose.: 107 mg/dL (17 Nov 2017 03:49)  POCT Blood Glucose.: 67 mg/dL (17 Nov 2017 03:47)  POCT Blood Glucose.: 81 mg/dL (16 Nov 2017 22:05)  POCT Blood Glucose.: 160 mg/dL (16 Nov 2017 19:28)  POCT Blood Glucose.: 198 mg/dL (16 Nov 2017 18:32)  POCT Blood Glucose.: 54 mg/dL (16 Nov 2017 17:53)  POCT Blood Glucose.: 50 mg/dL (16 Nov 2017 16:59)  POCT Blood Glucose.: 69 mg/dL (16 Nov 2017 15:51)  POCT Blood Glucose.: 93 mg/dL (16 Nov 2017 10:30)      PHYSICAL EXAM:        Neck:  No JVD  Respiratory: symmetric air entry biaterally , No wheezing, rales, rhonchi  Cardiovascular: S1 and S2, regular rate and rhythm, no Murmurs, gallops or rubs  Gastrointestinal: abdomen distended ; with sluggish bowel sounds  Extremities:pe ripheral edema  Vascular: 2+ peripheral pulses    MEDICATIONS:  MEDICATIONS  (STANDING):  aspirin  chewable 81 milliGRAM(s) Oral daily  chlorhexidine 0.12% Liquid 5 milliLiter(s) Swish and Spit two times a day  dextrose 5% + sodium chloride 0.9%. 1000 milliLiter(s) (100 mL/Hr) IV Continuous <Continuous>  dextrose 5%. 1000 milliLiter(s) (50 mL/Hr) IV Continuous <Continuous>  dextrose 50% Injectable 12.5 Gram(s) IV Push once  dextrose 50% Injectable 25 Gram(s) IV Push once  dextrose 50% Injectable 25 Gram(s) IV Push once  influenza   Vaccine 0.5 milliLiter(s) IntraMuscular once  levothyroxine Injectable 90 MICROGram(s) IV Push daily  meropenem IVPB 500 milliGRAM(s) IV Intermittent daily  norepinephrine Infusion 0.01 MICROgram(s)/kG/Min (1.701 mL/Hr) IV Continuous <Continuous>  pantoprazole  Injectable 40 milliGRAM(s) IV Push daily  vasopressin Infusion 0.04 Unit(s)/Min (2.4 mL/Hr) IV Continuous <Continuous>      LABS: All Labs Reviewed:                        8.6    24.3  )-----------( 146      ( 17 Nov 2017 05:04 )             25.5     11-17    136  |  101  |  87<H>  ----------------------------<  107<H>  4.2   |  23  |  6.07<H>    Ca    5.5<LL>      17 Nov 2017 05:04  Phos  7.5     11-17  Mg     1.8     11-17    TPro  4.6<L>  /  Alb  1.0<L>  /  TBili  2.1<H>  /  DBili  x   /  AST  506<H>  /  ALT  403<H>  /  AlkPhos  115  11-17    PT/INR - ( 17 Nov 2017 05:04 )   PT: 30.0 sec;   INR: 2.72 ratio               Blood Culture: 11-13 @ 03:29  Organism --  Gram Stain Blood -- Gram Stain --  Specimen Source .Blood Blood  only aerobic bottle  Culture-Blood --    11-12 @ 22:50  Organism Blood Culture PCR  Gram Stain Blood -- Gram Stain   Growth in aerobic bottle: Gram Positive Cocci in Clusters  Growth in anaerobic bottle: Gram Positive Cocci in Clusters  Specimen Source .Blood Blood  Culture-Blood --    11-12 @ 17:12  Organism --  Gram Stain Blood -- Gram Stain --  Specimen Source .Blood None  Culture-Blood --    11-12 @ 17:10  Organism --  Gram Stain Blood -- Gram Stain --  Specimen Source .Blood None  Culture-Blood --    11-12 @ 17:05  Organism Klebsiella pneumoniae  Gram Stain Blood -- Gram Stain --  Specimen Source .Urine Catheterized  Culture-Blood
pt comfortable on dilaudid infusion    passed peacefully at 14:43 on 11/18/17 with family at bedside.    Certificate completed    HCS reference:   [id number]    :juan carlos fitch  Nov-
66 y/o F PMHx COPD, CVA, HTN, Hypothyroid, pulm htn, CHF, on coumadin, asa, BIBA after being found unresponsive, found to have severe pancreatitis. Has been anuric since admission - renal called for evaluation.    Currently intubated with 40% FiO2, requiring pressor support. Anuric. Initially with severe acidosis, now relatively corrected.     - on 2 pressors, UOP slightly improving - made 130ml over past 20 hours        PAST MEDICAL & SURGICAL HISTORY:  Hypothyroidism  Atheroma  COPD (chronic obstructive pulmonary disease)  Severe tricuspid regurgitation  Moderate to severe mitral regurgitation  Pulmonary hypertension  Obesity  CVA (cerebral vascular accident)  Diastolic congestive heart failure  Systolic heart failure  HTN (hypertension)  History of       MEDICATIONS  (STANDING):  aspirin  chewable 81 milliGRAM(s) Oral daily  chlorhexidine 0.12% Liquid 5 milliLiter(s) Swish and Spit two times a day  dextrose 5%. 1000 milliLiter(s) (50 mL/Hr) IV Continuous <Continuous>  dextrose 50% Injectable 12.5 Gram(s) IV Push once  dextrose 50% Injectable 25 Gram(s) IV Push once  dextrose 50% Injectable 25 Gram(s) IV Push once  influenza   Vaccine 0.5 milliLiter(s) IntraMuscular once  meropenem IVPB 500 milliGRAM(s) IV Intermittent daily  norepinephrine Infusion 0.01 MICROgram(s)/kG/Min (1.701 mL/Hr) IV Continuous <Continuous>  pantoprazole  Injectable 40 milliGRAM(s) IV Push daily  propofol Infusion 5 MICROgram(s)/kG/Min (2.721 mL/Hr) IV Continuous <Continuous>  sodium chloride 0.9%. 1000 milliLiter(s) (100 mL/Hr) IV Continuous <Continuous>  vasopressin Infusion 0.04 Unit(s)/Min (2.4 mL/Hr) IV Continuous <Continuous>      Vital Signs Last 24 Hrs  T(C): 37.8 (2017 08:30), Max: 38.3 (15 Nov 2017 17:00)  T(F): 100.1 (2017 08:30), Max: 101 (15 Nov 2017 17:00)  HR: 87 (2017 13:30) (75 - 99)  BP: 121/50 (2017 13:30) (81/38 - 145/66) - on pressor support Levo/Vaso  BP(mean): 73 (2017 13:30) (49 - 86)  RR: 21 (2017 13:30) (17 - 23)  SpO2: 100% (2017 12:30) (100% - 100%)  Daily     Daily   I&O's Summary    15 Nov 2017 07:  -  2017 07:00  --------------------------------------------------------  IN: 4040.9 mL / OUT: 2165 mL / NET: 1875.9 mL    2017 07:  -  2017 14:11  --------------------------------------------------------  IN: 416.8 mL / OUT: 77 mL / NET: 339.8 mL        Allergies    No Known Drug Allergies  ALLERGIC TO DOG/CAT/HORSE HAIR;PLANTS: SNEEZING,SCRATCHY THROAT,STUFFY NOSE,ITCHY EYES] reaction to [Animals] (Unknown)    Intolerances        SOCIAL HISTORY:  Unknown    FAMILY HISTORY:  No pertinent family history in first degree relatives      REVIEW OF SYSTEMS:  UTO since unresponsive              PHYSICAL EXAM:    Constitutional: intubated, unresponsive  HEENT: intubated with FiO2 40%  Neck: No LAD, No JVD  Respiratory: CTAB, no wheeze or crackles  Cardiovascular: S1 and S2  Gastrointestinal: BS+, soft, NT/ND, +rectal tube  Extremities: trace peripheral edema  Neurological: no response  Psychiatric: unresponsive  : +[place] with medium yellow ouptut  Skin: No rashes  Access: Not applicable    LABS:                           9.3    23.1  )-----------( 189      ( 2017 05:56 )             29.0                      10.4   15.3  )-----------( 303      ( 2017 05:24 )             33.0         134    |  101    |  76     ----------------------------<  54        2017 05:56  5.1     |  19     |  6.03     139    |  100    |  76     ----------------------------<  80        15 Nov 2017 22:16  4.4     |  26     |  6.08     138    |  100    |  72     ----------------------------<  167       15 Nov 2017 05:08  4.0     |  24     |  5.49     Ca    5.5        2017 05:56 - corrected 7.9  Ca    5.5        15 Nov 2017 22:16    Phos  5.1       15 Nov 2017 05:08  Phos  3.5       2017 05:24    Mg     1.5       15 Nov 2017 05:08  Mg     1.4       2017 05:24    TPro  4.5    /  Alb  0.9    /  TBili  2.3    /        2017 05:56  DBili  x      /  AST  840    /  ALT  743    /  AlkPhos  140      TPro  4.5    /  Alb  1.1    /  TBili  2.2    /        15 Nov 2017 22:16  DBili  x      /  AST  988    /  ALT  910    /  AlkPhos  139        144    |  115    |  58     ----------------------------<  73        2017 18:00  4.4     |  14     |  3.81     Ca    5.2        2017 05:24 - corrected 7.2  Ca    5.6        2017 23:55    Phos  3.5       2017 05:24  Phos  3.5       2017 18:00    Mg     1.4       2017 05:24  Mg     1.4       2017 18:00    TPro  4.2    /  Alb  1.4    /  TBili  2.2    /        2017 05:24  DBili  x      /  AST  1679   /  ALT  1393   /  AlkPhos  162      TPro  4.7    /  Alb  1.6    /  TBili  2.4    /        2017 23:55  DBili  x      /  AST  1968   /  ALT  1566   /  AlkPhos  190          ABG - ( 2017 18:07 )  pH: 7.40  /  pCO2: 32    /  pO2: 70    / HCO3: 19    / Base Excess: -5    /  SaO2: 92            Urine Studies:  Urinalysis Basic - ( 2017 17:05 )    Color: [place] / Appearance: very cloudy / S.025 / pH: x  Gluc: x / Ketone: Trace  / Bili: Moderate / Urobili: 4 mg/dL   Blood: x / Protein: 100 mg/dL / Nitrite: Positive   Leuk Esterase: Trace / RBC: 6-10 /HPF / WBC 0-2   Sq Epi: x / Non Sq Epi: Occasional / Bacteria: TNTC            RADIOLOGY & ADDITIONAL STUDIES:
66 y/o F PMHx COPD, CVA, HTN, Hypothyroid, pulm htn, CHF, on coumadin, asa, BIBA after being found unresponsive, found to have severe pancreatitis. Has been anuric since admission - renal called for evaluation.    Currently intubated with 40% FiO2, requiring pressor support. Anuric. Initially with severe acidosis, now relatively corrected.     - on 2 pressors, UOP slightly improving - made 130ml over past 20 hours   - UOP 350ml overnight, weaning pressors as tolerated        PAST MEDICAL & SURGICAL HISTORY:  Hypothyroidism  Atheroma  COPD (chronic obstructive pulmonary disease)  Severe tricuspid regurgitation  Moderate to severe mitral regurgitation  Pulmonary hypertension  Obesity  CVA (cerebral vascular accident)  Diastolic congestive heart failure  Systolic heart failure  HTN (hypertension)  History of       MEDICATIONS  (STANDING):  aspirin  chewable 81 milliGRAM(s) Oral daily  chlorhexidine 0.12% Liquid 5 milliLiter(s) Swish and Spit two times a day  dextrose 5% + sodium chloride 0.9%. 1000 milliLiter(s) (100 mL/Hr) IV Continuous <Continuous>  dextrose 5%. 1000 milliLiter(s) (50 mL/Hr) IV Continuous <Continuous>  dextrose 50% Injectable 12.5 Gram(s) IV Push once  dextrose 50% Injectable 25 Gram(s) IV Push once  dextrose 50% Injectable 25 Gram(s) IV Push once  influenza   Vaccine 0.5 milliLiter(s) IntraMuscular once  levothyroxine Injectable 90 MICROGram(s) IV Push daily  meropenem IVPB 500 milliGRAM(s) IV Intermittent daily  norepinephrine Infusion 0.01 MICROgram(s)/kG/Min (1.701 mL/Hr) IV Continuous <Continuous>  pantoprazole  Injectable 40 milliGRAM(s) IV Push daily  vasopressin Infusion 0.04 Unit(s)/Min (2.4 mL/Hr) IV Continuous <Continuous>      Vital Signs Last 24 Hrs  T(C): 37.2 (2017 06:00), Max: 37.9 (2017 22:00)  T(F): 98.9 (2017 06:00), Max: 100.3 (2017 22:00)  HR: 74 (2017 10:00) (71 - 90)  BP: 114/38 (2017 10:00) (95/43 - 162/57)  BP(mean): 63 (2017 10:00) (54 - 81)  RR: 19 (2017 10:00) (13 - 23)  SpO2: 100% (2017 10:00) (99% - 100%)  Daily     Daily Weight in k.3 (2017 02:00)  I&O's Summary    2017 07:  -  2017 07:00  --------------------------------------------------------  IN: 3363 mL / OUT: 2413 mL / NET: 950 mL    2017 07:01  -  2017 10:34  --------------------------------------------------------  IN: 358.5 mL / OUT: 95 mL / NET: 263.5 mL                  REVIEW OF SYSTEMS:  UTO since unresponsive      PHYSICAL EXAM:    Constitutional: intubated, unresponsive  HEENT: intubated with FiO2 40%  Neck: No LAD, No JVD  Respiratory: CTAB, no wheeze or crackles  Cardiovascular: S1 and S2  Gastrointestinal: BS+, soft, NT/ND, +rectal tube  Extremities: trace peripheral edema  Neurological: no response  Psychiatric: unresponsive  : +[place] with [place] colored urine  Skin: No rashes  Access: Not applicable    LABS:                              8.6    24.3  )-----------( 146      ( 2017 05:04 )             25.5                      9.3    23.1  )-----------( 189      ( 2017 05:56 )             29.0                      10.4   15.3  )-----------( 303      ( 2017 05:24 )             33.0       136    |  101    |  87     ----------------------------<  107       2017 05:04  4.2     |  23     |  6.07     139    |  103    |  83     ----------------------------<  204       2017 22:31  4.1     |  23     |  6.14     134    |  101    |  76     ----------------------------<  54        2017 05:56  5.1     |  19     |  6.03     139    |  100    |  76     ----------------------------<  80        15 Nov 2017 22:16  4.4     |  26     |  6.08     138    |  100    |  72     ----------------------------<  167       15 Nov 2017 05:08  4.0     |  24     |  5.49     Ca    5.5        2017 05:56 - corrected 7.9  Ca    5.5        15 Nov 2017 22:16    Phos  5.1       15 Nov 2017 05:08  Phos  3.5       2017 05:24    Mg     1.5       15 Nov 2017 05:08  Mg     1.4       2017 05:24    TPro  4.5    /  Alb  0.9    /  TBili  2.3    /        2017 05:56  DBili  x      /  AST  840    /  ALT  743    /  AlkPhos  140      TPro  4.5    /  Alb  1.1    /  TBili  2.2    /        15 Nov 2017 22:16  DBili  x      /  AST  988    /  ALT  910    /  AlkPhos  139            ABG - ( 2017 18:07 )  pH: 7.40  /  pCO2: 32    /  pO2: 70    / HCO3: 19    / Base Excess: -5    /  SaO2: 92            Urine Studies:  Urinalysis Basic - ( 2017 17:05 )    Color: [place] / Appearance: very cloudy / S.025 / pH: x  Gluc: x / Ketone: Trace  / Bili: Moderate / Urobili: 4 mg/dL   Blood: x / Protein: 100 mg/dL / Nitrite: Positive   Leuk Esterase: Trace / RBC: 6-10 /HPF / WBC 0-2   Sq Epi: x / Non Sq Epi: Occasional / Bacteria: TNTC            RADIOLOGY & ADDITIONAL STUDIES:
67y Female complaining of unresponsive.	  · Unable to Obtain: Unresponsive	  · Details: Unresponsive	  · HPI Objective Statement: 66 y/o F PMHx COPD, CVA, HTN, Hypothyroid, pulm htn, CHF, on coumadin, asa, presents to the ED s/p unresponsive. The pt was BIBA after being found unresponsive in her bathroom while having a bowel movement. Further history unavailable as pt is unresponsive.	    above d.w Dr Anthony who initially evaluated and treated the patient.    Admitted with diagnosis of severe pancreatitis - intubated, on pressor for support of MAP.    pt seen initially at 8am and then hourly over the course of the day - d/w charge and bedside RN's, D/w Dr Bailey and Dr Marin.    Empiric abx changed to meropenem    Pt anuric despite aggressive hydration and NaHCO3 infusion - remains on levophed infusion and vent support.    Coagulopathy due to coumadin also noted a treated with vitamin K 2.5mg PO x 1 - no evidence of bleeding at this time.    Pt off sedation but severely encephalopathic.    Pt daughter at bedside at 18:30 case discussed in detail including poor prognosis, current issues and plan for further care.    11/14: still anuric despite IVF and NaHCO3 infusion - requiring less pressors but still remains critically ill on vent support - remains encephalopathic.    Case again discussed in detail with pts daughter and with pts sister at bedside - all questions answered and severity of illness plainly explained - also discussed likely need fo HD initiation on 11/15 if no significant improvement on urine output.    notably pt with coag neg staph in 1 blood culture from admission.    addendum: pt with drop in SBP necessitating addition of 2nd pressor (vasopressin infusion)    11/15: no sig overnight events - pt did make some urine - 90 ml overnight and 100ml so far today.  No acute indication for HD as d/w nephrology.  D.w GI this am and will get MRCP to assess for panc stones - d/w pts daughter via telephone and issues/plans discussed as well as need for MRCP - she is agreeable and notes that despite cardiac stents, pt had MRI of her shoulder recently without issue.    Pt remains on 2 pressors.  Intubated.  Remains encephalopathic but starting to move arms and legs spont and in response to nox stim.    Allergies    No Known Drug Allergies  Originally Entered as [see Comment: &quot;&quot;ALLERGIC TO DOG/CAT/HORSE HAIR;PLANTS: SNEEZING,SCRATCHY THROAT,STUFFY NOSE,ITCHY EYES] reaction to [Animals] (Unknown)    ICU Vital Signs Last 24 Hrs  T(C): 38 (15 Nov 2017 13:00), Max: 38.6 (14 Nov 2017 17:00)  T(F): 100.4 (15 Nov 2017 13:00), Max: 101.4 (14 Nov 2017 17:00)  HR: 96 (15 Nov 2017 14:00) (80 - 119)  BP: 99/55 (15 Nov 2017 14:00) (80/33 - 141/61)  BP(mean): 64 (15 Nov 2017 14:00) (37 - 99)  ABP: --  ABP(mean): --  RR: 19 (15 Nov 2017 14:00) (12 - 26)  SpO2: 100% (15 Nov 2017 13:07) (99% - 100%)      Mode: AC/ CMV (Assist Control/ Continuous Mandatory Ventilation)  RR (machine): 12  TV (machine): 500  FiO2: 40  PEEP: 5  ITime: 1  PIP: 31      I&O's Summary    14 Nov 2017 07:01  -  15 Nov 2017 07:00  --------------------------------------------------------  IN: 8898.1 mL / OUT: 1545 mL / NET: 7353.1 mL    15 Nov 2017 07:01  -  15 Nov 2017 15:47  --------------------------------------------------------  IN: 1045.6 mL / OUT: 0 mL / NET: 1045.6 mL                              9.4    14.1  )-----------( 225      ( 15 Nov 2017 05:08 )             30.4       11-15    138  |  100  |  72<H>  ----------------------------<  167<H>  4.0   |  24  |  5.49<H>    Ca    <3.0<!!>      15 Nov 2017 05:08  Phos  5.1     11-15  Mg     1.5     11-15    TPro  4.0<L>  /  Alb  1.0<L>  /  TBili  2.5<H>  /  DBili  x   /  AST  1252<H>  /  ALT  1096<H>  /  AlkPhos  141<H>  11-15      CAPILLARY BLOOD GLUCOSE      POCT Blood Glucose.: 101 mg/dL (14 Nov 2017 17:53)      LIVER FUNCTIONS - ( 15 Nov 2017 05:08 )  Alb: 1.0 g/dL / Pro: 4.0 gm/dL / ALK PHOS: 141 U/L / ALT: 1096 U/L / AST: 1252 U/L / GGT: x           PT/INR - ( 15 Nov 2017 05:08 )   PT: 25.9 sec;   INR: 2.36 ratio        ABG - ( 14 Nov 2017 18:07 )  pH: 7.40  /  pCO2: 32    /  pO2: 70    / HCO3: 19    / Base Excess: -5    /  SaO2: 92        MEDICATIONS  (STANDING):  aspirin  chewable 81 milliGRAM(s) Oral daily  chlorhexidine 0.12% Liquid 5 milliLiter(s) Swish and Spit two times a day  influenza   Vaccine 0.5 milliLiter(s) IntraMuscular once  levothyroxine 175 MICROGram(s) Oral daily  meropenem IVPB 500 milliGRAM(s) IV Intermittent daily  norepinephrine Infusion 0.01 MICROgram(s)/kG/Min (1.701 mL/Hr) IV Continuous <Continuous>  pantoprazole  Injectable 40 milliGRAM(s) IV Push daily  propofol Infusion 5 MICROgram(s)/kG/Min (2.721 mL/Hr) IV Continuous <Continuous>  sodium chloride 0.9%. 1000 milliLiter(s) (100 mL/Hr) IV Continuous <Continuous>  vasopressin Infusion 0.04 Unit(s)/Min (2.4 mL/Hr) IV Continuous <Continuous>    MEDICATIONS  (PRN):  acetaminophen    Suspension 650 milliGRAM(s) Oral every 6 hours PRN pain and temp  LORazepam   Injectable 1 milliGRAM(s) IV Push every 2 hours PRN Agitation      Review of Systems:   · Additional ROS	Encephalopathic	    Physical Exam:   · Constitutional	detailed exam	  · Constitutional Details	obese, no distress	  · Eyes	detailed exam	  · Eyes Details	PERRL	  · Neck	detailed exam	  · Neck Details	supple; no JVD	  · Back	detailed exam	  · Back Details	normal shape	  · Respiratory	detailed exam	  · Respiratory Details	airway patent; breath sounds equal; good air movement	  · Respiratory Comments	orally intubated	  · Cardiovascular	detailed exam	  · Cardiovascular Details	irregular rate and rhythm	  · Gastrointestinal	detailed exam	  · GI Normal	soft	  · GI Abnormal	distended  bowel sounds hypoactive	  · Genitourinary	detailed exam	  · Genitourinary Comments	porter catheter in situ	  · Extremities	detailed exam	  · Extremities Details	no cyanosis; pedal edema	  · Pedal Edema Severity	1+	  · Vascular	Equal and normal pulses (carotid, femoral, dorsalis pedis)	  · Skin	detailed exam	  · Skin Details	warm and dry	  · Musculoskeletal	detailed exam	  · Musculoskeletal Details	normal strength	      DVT Prophylaxis:    Advanced Directives: DNR  Discussed with: HCP/Daughter    Visit Information: Critical care time 60 min    ** Time is exclusive of billed procedures and/or teaching and/or routine family updates.
67y Female complaining of unresponsive.	  · Unable to Obtain: Unresponsive	  · Details: Unresponsive	  · HPI Objective Statement: 66 y/o F PMHx COPD, CVA, HTN, Hypothyroid, pulm htn, CHF, on coumadin, asa, presents to the ED s/p unresponsive. The pt was BIBA after being found unresponsive in her bathroom while having a bowel movement. Further history unavailable as pt is unresponsive.	    above d.w Dr Anthony who initially evaluated and treated the patient.    Admitted with diagnosis of severe pancreatitis - intubated, on pressor for support of MAP.    pt seen initially at 8am and then hourly over the course of the day - d/w charge and bedside RN's, D/w Dr Bailey and Dr aMrin.    Empiric abx changed to meropenem    Pt anuric despite aggressive hydration and NaHCO3 infusion - remains on levophed infusion and vent support.    Coagulopathy due to coumadin also noted a treated with vitamin K 2.5mg PO x 1 - no evidence of bleeding at this time.    Pt off sedation but severely encephalopathic.    Pt daughter at bedside at 18:30 case discussed in detail including poor prognosis, current issues and plan for further care.    11/14: still anuric despite IVF and NaHCO3 infusion - requiring less pressors but still remains critically ill on vent support - remains encephalopathic.    Case again discussed in detail with pts daughter and with pts sister at bedside - all questions answered and severity of illness plainly explained - also discussed likely need fo HD initiation on 11/15 if no significant improvement on urine output.    notably pt with coag neg staph in 1 blood culture from admission.    addendum: pt with drop in SBP necessitating addition of 2nd pressor (vasopressin infusion)    11/15: no sig overnight events - pt did make some urine - 90 ml overnight and 100ml so far today.  No acute indication for HD as d/w nephrology.  D.w GI this am and will get MRCP to assess for panc stones - d/w pts daughter via telephone and issues/plans discussed as well as need for MRCP - she is agreeable and notes that despite cardiac stents, pt had MRI of her shoulder recently without issue.    Pt remains on 2 pressors.  Intubated.  Remains encephalopathic but starting to move arms and legs spont and in response to nox stim.    11/16: MRCP overnight did not demonstrate any pancreatic stones/pancreatic duct stones - pt remains critically ill on 2 pressors - still with scant urine output but no acute indication for HD.  D/w Dr Jurado.  D/w pts daughter Jing at bedside and all questions answered.  persistent low grade fever and leukocytosis noted.      11/17: slightly lower dose of levophed today - made approx 325ml urine overnight.  No acute indication for HD.  D/w Renal, GI, Surgery, ID consultants.  Remains encephalopathic - Ammonia WNL.      11/18: pt sent for CT abdomen to assess progression of pancreatitis, and also for CT head to assess for occult cause of AMS given failure to resolve encephalopathy over last few days.  CT head with massive holohemispheric RIGHT infarction with sig midline shift RIGHT to LEFT.  PE now demonstrates RIGHT pupillary dilatation (discordance of pupils) that had not previously been present.  She also appears to be exhibiting extensor posturing to noxious stimuli.    Remains on 2 pressors and intubated-  High likelihood that pt will progress to brainstem herniation given severity of CVA and worsening edema.    Discussed with daughter via telephone and all questions answered.  Will discuss further when she comes to visit but this is clearly not survivable.      Allergies    No Known Drug Allergies  Originally Entered as [see Comment: &quot;&quot;ALLERGIC TO DOG/CAT/HORSE HAIR;PLANTS: SNEEZING,SCRATCHY THROAT,STUFFY NOSE,ITCHY EYES] reaction to [Animals] (Unknown)    ICU Vital Signs Last 24 Hrs  T(C): 37.7 (18 Nov 2017 05:00), Max: 37.7 (18 Nov 2017 05:00)  T(F): 99.8 (18 Nov 2017 05:00), Max: 99.8 (18 Nov 2017 05:00)  HR: 87 (18 Nov 2017 08:00) (74 - 89)  BP: 127/60 (18 Nov 2017 08:00) (97/45 - 157/45)  BP(mean): 73 (18 Nov 2017 08:00) (48 - 81)  ABP: --  ABP(mean): --  RR: 16 (18 Nov 2017 08:00) (0 - 23)  SpO2: 100% (18 Nov 2017 06:30) (100% - 100%)      Mode: AC/ CMV (Assist Control/ Continuous Mandatory Ventilation)  RR (machine): 12  TV (machine): 500  FiO2: 40  PEEP: 5  ITime: 1  PIP: 24      I&O's Summary    17 Nov 2017 07:01  -  18 Nov 2017 07:00  --------------------------------------------------------  IN: 2748.5 mL / OUT: 2845 mL / NET: -96.5 mL    18 Nov 2017 07:01  -  18 Nov 2017 09:37  --------------------------------------------------------  IN: 300 mL / OUT: 0 mL / NET: 300 mL                              7.9    34.4  )-----------( 181      ( 18 Nov 2017 05:19 )             24.0       11-18    140  |  104  |  93  ----------------------------<  96  4.4   |  19  |  6.09    Ca    6.0      18 Nov 2017 05:19  Phos  7.5     11-17  Mg     1.8     11-17    TPro  4.5  /  Alb  1.0  /  TBili  2.4  /  DBili  x   /  AST  394<H>  /  ALT  239<H>  /  AlkPhos  118  11-18      CAPILLARY BLOOD GLUCOSE      POCT Blood Glucose.: 106 mg/dL (18 Nov 2017 05:41)  POCT Blood Glucose.: 109 mg/dL (17 Nov 2017 23:49)  POCT Blood Glucose.: 105 mg/dL (17 Nov 2017 20:06)  POCT Blood Glucose.: 109 mg/dL (17 Nov 2017 16:51)  POCT Blood Glucose.: 107 mg/dL (17 Nov 2017 13:37)      LIVER FUNCTIONS - ( 18 Nov 2017 05:19 )  Alb: 1.0 g/dL / Pro: 4.5 gm/dL / ALK PHOS: 118 U/L / ALT: 239 U/L / AST: 394 U/L / GGT: x           PT/INR - ( 18 Nov 2017 05:19 )   PT: 62.7 sec;   INR: 5.60 ratio      ABG - ( 18 Nov 2017 04:52 )  pH: 7.24  /  pCO2: 46    /  pO2: 74    / HCO3: 19    / Base Excess: -8    /  SaO2: 90          MEDICATIONS  (STANDING):  aspirin  chewable 81 milliGRAM(s) Oral daily  chlorhexidine 0.12% Liquid 5 milliLiter(s) Swish and Spit two times a day  dextrose 5% + sodium chloride 0.9%. 1000 milliLiter(s) (100 mL/Hr) IV Continuous <Continuous>  dextrose 5%. 1000 milliLiter(s) (50 mL/Hr) IV Continuous <Continuous>  dextrose 50% Injectable 12.5 Gram(s) IV Push once  dextrose 50% Injectable 25 Gram(s) IV Push once  dextrose 50% Injectable 25 Gram(s) IV Push once  influenza   Vaccine 0.5 milliLiter(s) IntraMuscular once  levothyroxine Injectable 90 MICROGram(s) IV Push daily  meropenem IVPB 500 milliGRAM(s) IV Intermittent daily  micafungin IVPB      micafungin IVPB 100 milliGRAM(s) IV Intermittent every 24 hours  norepinephrine Infusion 0.01 MICROgram(s)/kG/Min (1.701 mL/Hr) IV Continuous <Continuous>  pantoprazole  Injectable 40 milliGRAM(s) IV Push daily  phytonadione   Solution 2.5 milliGRAM(s) Oral once  vasopressin Infusion 0.04 Unit(s)/Min (2.4 mL/Hr) IV Continuous <Continuous>    MEDICATIONS  (PRN):  acetaminophen    Suspension 650 milliGRAM(s) Oral every 6 hours PRN pain and temp  dextrose Gel 1 Dose(s) Oral once PRN Blood Glucose LESS THAN 70 milliGRAM(s)/deciLiter  glucagon  Injectable 1 milliGRAM(s) IntraMuscular once PRN Glucose <70 milliGRAM(s)/deciLiter  LORazepam   Injectable 1 milliGRAM(s) IV Push every 2 hours PRN Agitation    Review of Systems:   · Additional ROS	Encephalopathic	    Physical Exam:   · Constitutional	detailed exam	  · Constitutional Details	obese, no distress	  · Eyes	detailed exam	  · Eyes Details	Left pupil small and reactive, Right pupil approx 5 mm and poorly responsive to light	  · Neck	detailed exam	  · Neck Details	supple; no JVD	  · Back	detailed exam	  · Back Details	normal shape	  · Respiratory	detailed exam	  · Respiratory Details	airway patent; breath sounds equal; good air movement	  · Respiratory Comments	orally intubated	  · Cardiovascular	detailed exam	  · Cardiovascular Details	irregular rate and rhythm	  · Gastrointestinal	detailed exam	  · GI Normal	soft	  · GI Abnormal	distended  bowel sounds hypoactive	  · Genitourinary	detailed exam	  · Genitourinary Comments	porter catheter in situ	  · Extremities	detailed exam	  · Extremities Details	no cyanosis; pedal edema	  · Pedal Edema Severity	1+	  · Vascular	Equal and normal pulses (carotid, femoral, dorsalis pedis)	  · Skin	detailed exam	  · Skin Details	warm and dry	  · Musculoskeletal	detailed exam	  · Musculoskeletal Details  Neurologic exam	normal strength pt with extensor posturing to noxious stimuli which is new finding as of today.	      DVT Prophylaxis:    Advanced Directives: DNR  Discussed with: HCP/Daughter    Visit Information: Critical care time 75 min.    ** Time is exclusive of billed procedures and/or teaching and/or routine family updates.
67y Female complaining of unresponsive.	  · Unable to Obtain: Unresponsive	  · Details: Unresponsive	  · HPI Objective Statement: 68 y/o F PMHx COPD, CVA, HTN, Hypothyroid, pulm htn, CHF, on coumadin, asa, presents to the ED s/p unresponsive. The pt was BIBA after being found unresponsive in her bathroom while having a bowel movement. Further history unavailable as pt is unresponsive.	    above d.w Dr Anthony who initially evaluated and treated the patient.    Admitted with diagnosis of severe pancreatitis - intubated, on pressor for support of MAP.    pt seen initially at 8am and then hourly over the course of the day - d/w charge and bedside RN's, D/w Dr Bailey and Dr Marin.    Empiric abx changed to meropenem    Pt anuric despite aggressive hydration and NaHCO3 infusion - remains on levophed infusion and vent support.    Coagulopathy due to coumadin also noted a treated with vitamin K 2.5mg PO x 1 - no evidence of bleeding at this time.    Pt off sedation but severely encephalopathic.    Pt daughter at bedside at 18:30 case discussed in detail including poor prognosis, current issues and plan for further care.    11/14: still anuric despite IVF and NaHCO3 infusion - requiring less pressors but still remains critically ill on vent support - remains encephalopathic.    Case again discussed in detail with pts daughter and with pts sister at bedside - all questions answered and severity of illness plainly explained - also discussed likely need fo HD initiation on 11/15 if no significant improvement on urine output.    notably pt with coag neg staph in 1 blood culture from admission.    addendum: pt with drop in SBP necessitating addition of 2nd pressor (vasopressin infusion)    11/15: no sig overnight events - pt did make some urine - 90 ml overnight and 100ml so far today.  No acute indication for HD as d/w nephrology.  D.w GI this am and will get MRCP to assess for panc stones - d/w pts daughter via telephone and issues/plans discussed as well as need for MRCP - she is agreeable and notes that despite cardiac stents, pt had MRI of her shoulder recently without issue.    Pt remains on 2 pressors.  Intubated.  Remains encephalopathic but starting to move arms and legs spont and in response to nox stim.    11/16: MRCP overnight did not demonstrate any pancreatic stones/pancreatic duct stones - pt remains critically ill on 2 pressors - still with scant urine output but no acute indication for HD.  D/w Dr Jurado.  D/w pts daughter Jing at bedside and all questions answered.  persistent low grade fever and leukocytosis noted.      Allergies    No Known Drug Allergies  Originally Entered as [see Comment: &quot;&quot;ALLERGIC TO DOG/CAT/HORSE HAIR;PLANTS: SNEEZING,SCRATCHY THROAT,STUFFY NOSE,ITCHY EYES] reaction to [Animals] (Unknown)    ICU Vital Signs Last 24 Hrs  T(C): 37.8 (16 Nov 2017 08:30), Max: 38.3 (15 Nov 2017 17:00)  T(F): 100.1 (16 Nov 2017 08:30), Max: 101 (15 Nov 2017 17:00)  HR: 87 (16 Nov 2017 10:30) (77 - 99)  BP: 111/63 (16 Nov 2017 10:30) (81/38 - 145/66)  BP(mean): 73 (16 Nov 2017 10:30) (49 - 99)  ABP: --  ABP(mean): --  RR: 19 (16 Nov 2017 10:30) (17 - 24)  SpO2: 100% (16 Nov 2017 10:30) (100% - 100%)      Mode: AC/ CMV (Assist Control/ Continuous Mandatory Ventilation)  RR (machine): 12  TV (machine): 500  FiO2: 40  PEEP: 5  ITime: 1  PIP: 30      I&O's Summary    15 Nov 2017 07:01  -  16 Nov 2017 07:00  --------------------------------------------------------  IN: 4040.9 mL / OUT: 2165 mL / NET: 1875.9 mL    16 Nov 2017 07:01  -  16 Nov 2017 10:42  --------------------------------------------------------  IN: 387.2 mL / OUT: 25 mL / NET: 362.2 mL                              9.3    23.1  )-----------( 189      ( 16 Nov 2017 05:56 )             29.0       11-16    134<L>  |  101  |  76<H>  ----------------------------<  54<L>  5.1   |  19<L>  |  6.03<H>    Ca    5.5<LL>      16 Nov 2017 05:56  Phos  5.1     11-15  Mg     1.5     11-15    TPro  4.5<L>  /  Alb  0.9<L>  /  TBili  2.3<H>  /  DBili  x   /  AST  840<H>  /  ALT  743<H>  /  AlkPhos  140<H>  11-16      CAPILLARY BLOOD GLUCOSE      POCT Blood Glucose.: 93 mg/dL (16 Nov 2017 10:30)      LIVER FUNCTIONS - ( 16 Nov 2017 05:56 )  Alb: 0.9 g/dL / Pro: 4.5 gm/dL / ALK PHOS: 140 U/L / ALT: 743 U/L / AST: 840 U/L / GGT: x           PT/INR - ( 16 Nov 2017 05:56 )   PT: 57.3 sec;   INR: 5.13 ratio      ABG - ( 14 Nov 2017 18:07 )  pH: 7.40  /  pCO2: 32    /  pO2: 70    / HCO3: 19    / Base Excess: -5    /  SaO2: 92        MEDICATIONS  (STANDING):  aspirin  chewable 81 milliGRAM(s) Oral daily  chlorhexidine 0.12% Liquid 5 milliLiter(s) Swish and Spit two times a day  dextrose 5%. 1000 milliLiter(s) (50 mL/Hr) IV Continuous <Continuous>  dextrose 50% Injectable 12.5 Gram(s) IV Push once  dextrose 50% Injectable 25 Gram(s) IV Push once  dextrose 50% Injectable 25 Gram(s) IV Push once  influenza   Vaccine 0.5 milliLiter(s) IntraMuscular once  meropenem IVPB 500 milliGRAM(s) IV Intermittent daily  norepinephrine Infusion 0.01 MICROgram(s)/kG/Min (1.701 mL/Hr) IV Continuous <Continuous>  pantoprazole  Injectable 40 milliGRAM(s) IV Push daily  propofol Infusion 5 MICROgram(s)/kG/Min (2.721 mL/Hr) IV Continuous <Continuous>  sodium chloride 0.9%. 1000 milliLiter(s) (100 mL/Hr) IV Continuous <Continuous>  vasopressin Infusion 0.04 Unit(s)/Min (2.4 mL/Hr) IV Continuous <Continuous>    MEDICATIONS  (PRN):  acetaminophen    Suspension 650 milliGRAM(s) Oral every 6 hours PRN pain and temp  dextrose Gel 1 Dose(s) Oral once PRN Blood Glucose LESS THAN 70 milliGRAM(s)/deciLiter  glucagon  Injectable 1 milliGRAM(s) IntraMuscular once PRN Glucose <70 milliGRAM(s)/deciLiter  LORazepam   Injectable 1 milliGRAM(s) IV Push every 2 hours PRN Agitation      Review of Systems:   · Additional ROS	Encephalopathic	    Physical Exam:   · Constitutional	detailed exam	  · Constitutional Details	obese, no distress	  · Eyes	detailed exam	  · Eyes Details	PERRL	  · Neck	detailed exam	  · Neck Details	supple; no JVD	  · Back	detailed exam	  · Back Details	normal shape	  · Respiratory	detailed exam	  · Respiratory Details	airway patent; breath sounds equal; good air movement	  · Respiratory Comments	orally intubated	  · Cardiovascular	detailed exam	  · Cardiovascular Details	irregular rate and rhythm	  · Gastrointestinal	detailed exam	  · GI Normal	soft	  · GI Abnormal	distended  bowel sounds hypoactive	  · Genitourinary	detailed exam	  · Genitourinary Comments	porter catheter in situ	  · Extremities	detailed exam	  · Extremities Details	no cyanosis; pedal edema	  · Pedal Edema Severity	1+	  · Vascular	Equal and normal pulses (carotid, femoral, dorsalis pedis)	  · Skin	detailed exam	  · Skin Details	warm and dry	  · Musculoskeletal	detailed exam	  · Musculoskeletal Details	normal strength	      DVT Prophylaxis:    Advanced Directives: DNR  Discussed with: HCP/Daughter    Visit Information: Critical care time 60 min    ** Time is exclusive of billed procedures and/or teaching and/or routine family updates.
CVC good position, no pneumothorax
HPI:  	  Patient is  66 y/o F PMHx COPD, CVA, HTN, Hypothyroid, pulm htn, CHF, on coumadin, asa, obesity admitted on  after being found at home on bathroom floor unresponsive; details unclear; history per medical record as patient is currently intubated, on pressor support and cannot provide history.  Today  patient remains on ventilatory and pressor support, still febrile      MEDICATIONS  (STANDING):  aspirin  chewable 81 milliGRAM(s) Oral daily  dextrose 5% 1000 milliLiter(s) (200 mL/Hr) IV Continuous <Continuous>  influenza   Vaccine 0.5 milliLiter(s) IntraMuscular once  levothyroxine 175 MICROGram(s) Oral daily  meropenem IVPB 500 milliGRAM(s) IV Intermittent every 12 hours  norepinephrine Infusion 0.01 MICROgram(s)/kG/Min (1.701 mL/Hr) IV Continuous <Continuous>  pantoprazole  Injectable 40 milliGRAM(s) IV Push daily  propofol Infusion 5 MICROgram(s)/kG/Min (2.721 mL/Hr) IV Continuous <Continuous>  sodium chloride 0.9%. 1000 milliLiter(s) (100 mL/Hr) IV Continuous <Continuous>    MEDICATIONS  (PRN):  acetaminophen    Suspension 650 milliGRAM(s) Oral every 6 hours PRN pain and temp  LORazepam   Injectable 1 milliGRAM(s) IV Push every 2 hours PRN Agitation      Vital Signs Last 24 Hrs  T(C): 38.2 (2017 09:00), Max: 38.9 (2017 06:00)  T(F): 100.7 (2017 09:00), Max: 102 (2017 06:00)  HR: 109 (2017 11:32) (96 - 121)  BP: 90/38 (2017 09:00) (86/36 - 137/54)  BP(mean): 52 (2017 09:00) (46 - 99)  RR: 27 (2017 09:00) (14 - 31)  SpO2: 100% (2017 11:32) (99% - 100%)    Physical Exam:            Constitutional: frail looking  HEENT: edema of face, intubated  Neck: supple  Respiratory: diminished breath sounds with scattered coarse breath sounds  Cardiovascular: S1S2 regular, no murmurs  Abdomen: soft, not tender, not distended, positive BS  Genitourinary: deferred  Rectal: deferred  Musculoskeletal: no muscle tenderness, no joint swelling or tenderness  Neurological: intubated  Skin: no rashes                          11.9   14.4  )-----------( 405      ( 2017 06:09 )             36.8     11-    145  |  116<H>  |  51<H>  ----------------------------<  77  4.0   |  15<L>  |  3.03<H>    Ca    5.8<LL>      2017 06:09    TPro  5.4<L>  /  Alb  2.2<L>  /  TBili  1.9<H>  /  DBili  x   /  AST  280<H>  /  ALT  248<H>  /  AlkPhos  236<H>       LIVER FUNCTIONS - ( 2017 17:33 )  Alb: 2.2 g/dL / Pro: 5.4 gm/dL / ALK PHOS: 236 U/L / ALT: 248 U/L / AST: 280 U/L / GGT: x           Urinalysis Basic - ( 2017 17:05 )    Color: Deann / Appearance: very cloudy / S.025 / pH: x  Gluc: x / Ketone: Trace  / Bili: Moderate / Urobili: 4 mg/dL   Blood: x / Protein: 100 mg/dL / Nitrite: Positive   Leuk Esterase: Trace / RBC: 6-10 /HPF / WBC 0-2   Sq Epi: x / Non Sq Epi: Occasional / Bacteria: TNTC    Lipase, Serum (17 @ 17:33)    Lipase, Serum: >26623 U/L    Lactate, Blood (17 @ 06:09)    Lactate, Blood: 3.2 mmol/L    Labs:                        10.4   15.3  )-----------( 303      ( 2017 05:24 )             33.0         143  |  108  |  64<H>  ----------------------------<  108<H>  4.2   |  18<L>  |  4.61<H>    Ca    5.2<LL>      2017 05:24  Phos  3.5       Mg     1.4         TPro  4.2<L>  /  Alb  1.4<L>  /  TBili  2.2<H>  /  DBili  x   /  AST  1679<H>  /  ALT  1393<H>  /  AlkPhos  162<H>             Cultures:       Culture - Blood (collected 17 @ 03:29)  Source: .Blood Blood  only aerobic bottle  Preliminary Report (17 @ 09:01):    No growth to date.    Culture - Blood (collected 17 @ 22:50)  Source: .Blood Blood  Gram Stain (17 @ 06:15):    Growth in aerobic bottle: Gram Positive Cocci in Clusters    Growth in anaerobic bottle: Gram Positive Cocci in Clusters  Preliminary Report (17 @ 06:16):    Growth in aerobic bottle: Gram Positive Cocci in Clusters    ***Blood Panel PCR results on this specimen are available    approximately 3 hours after the Gram stain result.***    Gram stain, PCR, and/or culture results may not always    correspond due to difference in methodologies.    ************************************************************    This PCR assay was performed using [employer].    The following targets are tested for: Enterococcus,    vancomycin resistant enterococci, Listeria monocytogenes,    coagulase negative staphylococci, S. aureus,    methicillin resistant S. aureus, Streptococcus agalactiae    (Group B), S. pneumoniae, S. pyogenes (Group A),    Acinetobacter baumannii, Enterobacter cloacae, E. coli,    Klebsiella oxytoca, K. pneumoniae, Proteus sp.,    Serratia marcescens, Haemophilus influenzae,    Neisseria meningitidis, Pseudomonas aeruginosa, Candida    albicans, C. glabrata, C krusei, C parapsilosis,    C. tropicalis and the KPC resistance gene.    Growth in anaerobic bottle: Gram Positive Cocci in Clusters  Organism: Blood Culture PCR (17 @ 05:30)  Organism: Blood Culture PCR (17 @ 05:30)      -  Coagulase negative Staphylococcus: Detec      Method Type: PCR    Culture - Blood (collected 17 @ 17:12)  Source: .Blood None  Preliminary Report (17 @ 01:02):    No growth to date.    Culture - Blood (collected 17 @ 17:10)  Source: .Blood None  Preliminary Report (17 @ 01:02):    No growth to date.    Culture - Urine (collected 17 @ 17:05)  Source: .Urine Catheterized  Preliminary Report (17 @ 18:30):    >100,000 CFU/ml Klebsiella pneumoniae          Radiology:< from: CT Abdomen and Pelvis No Cont (17 @ 18:52) >    EXAM:  CT ABDOMEN AND PELVIS                          EXAM:  CT CHEST                            PROCEDURE DATE:  2017          INTERPRETATION:  CLINICAL INFORMATION: Elevated lipase level and elevated   lactate. Questionable fall.    COMPARISON: None    PROCEDURE:   CT of the Chest, Abdomen and Pelvis was performed without intravenous   contrast.   Intravenous contrast: None.  Oral contrast: None.  Sagittal and coronal reformats were performed.    FINDINGS:    CHEST:     LUNGS AND LARGE AIRWAYS: ET tube in good position.. Multiple right-sided   pulmonary nodules are seen. To right middle lobe nodules measure 7 mm.   Right lower lobe nodule measures 5 mm. 3 mm right lower lobe nodule is   seen. Additional tiny nodules in the right lower lobe are noted. Complete   atelectasis of the left lower lobe. Dependent atelectasis in the right   lower lobe.  Back PLEURA: No pleural effusion.  VESSELS: Within normal limits.  HEART: Heart size is normal.No pericardial effusion.  MEDIASTINUM AND MARI: No lymphadenopathy.  CHEST WALL AND LOWER NECK: Within normal limits.    ABDOMEN AND PELVIS: Study is limited by lack of intravenous contrast.    LIVER: Within normal limits.  BILE DUCTS: Normal caliber.  GALLBLADDER: Cholelithiasis.  SPLEEN: Within normal limits.  PANCREAS: There is diffuse enlargement of the pancreas with several areas   of high density in an around the pancreatic head concerning for   hemorrhage. Extensive peripancreatic inflammation is noted.   ADRENALS: Within normal limits.  KIDNEYS/URETERS: Bilateral renal cysts .    BLADDER: Collapsed around a fully catheter balloon.  REPRODUCTIVE ORGANS: No pelvic masses.    BOWEL: Rectal catheter in place No bowel obstruction. Sigmoid   diverticulosis. Normal appendix. Fluid-filled proximal colon. Thickened   loops of jejunum are likely reactive to the peripancreatic inflammation.  PERITONEUM/RETROPERITONEUM: Mild hyperdensity abdominopelvic ascites.  VESSELS: Atherosclerotic vascular calcifications.    ABDOMINAL WALL: Lipoma in distal left iliopsoas.  BONES: Degenerative changes of the spine.    IMPRESSION:    Redemonstration of right lung nodules measuring up to 7 mm.    Complete left lower lobe atelectasis.    Diffuse pancreatic enlargement with peripancreatic fluid, some of which   is hemorrhagic. Findings concerning for hemorrhagic pancreatitis.   Evaluation is limited by lack of intravenous contrast.    Reactive jejunitis.    < end of copied text >      Advanced directive addressed: DNR
HPI:  	  Patient is  66 y/o F PMHx COPD, CVA, HTN, Hypothyroid, pulm htn, CHF, on coumadin, asa, obesity admitted on  after being found at home on bathroom floor unresponsive; details unclear; history per medical record as patient is currently intubated, on pressor support and cannot provide history.  Today  patient remains on ventilatory and pressor support, still febrile  Today 11/15 patient remains on vent and pressor support, persistently febrile    MEDICATIONS  (STANDING):  aspirin  chewable 81 milliGRAM(s) Oral daily  chlorhexidine 0.12% Liquid 5 milliLiter(s) Swish and Spit two times a day  influenza   Vaccine 0.5 milliLiter(s) IntraMuscular once  levothyroxine 175 MICROGram(s) Oral daily  magnesium sulfate  IVPB 1 Gram(s) IV Intermittent once  meropenem IVPB 500 milliGRAM(s) IV Intermittent every 24 hours  norepinephrine Infusion 0.01 MICROgram(s)/kG/Min (1.701 mL/Hr) IV Continuous <Continuous>  pantoprazole  Injectable 40 milliGRAM(s) IV Push daily  propofol Infusion 5 MICROgram(s)/kG/Min (2.721 mL/Hr) IV Continuous <Continuous>  sodium chloride 0.9%. 1000 milliLiter(s) (100 mL/Hr) IV Continuous <Continuous>  vasopressin Infusion 0.04 Unit(s)/Min (2.4 mL/Hr) IV Continuous <Continuous>    MEDICATIONS  (PRN):  acetaminophen    Suspension 650 milliGRAM(s) Oral every 6 hours PRN pain and temp  LORazepam   Injectable 1 milliGRAM(s) IV Push every 2 hours PRN Agitation      Vital Signs Last 24 Hrs  T(C): 37.2 (15 Nov 2017 08:00), Max: 38.6 (2017 13:00)  T(F): 99 (15 Nov 2017 08:00), Max: 101.4 (2017 13:00)  HR: 87 (15 Nov 2017 10:00) (80 - 120)  BP: 126/43 (15 Nov 2017 10:00) (74/38 - 141/61)  BP(mean): 60 (15 Nov 2017 10:00) (37 - 84)  RR: 21 (15 Nov 2017 10:00) (12 - 28)  SpO2: 100% (15 Nov 2017 07:58) (99% - 100%)    Physical Exam:          Constitutional: frail looking  HEENT: edema of face, intubated  Neck: supple  Respiratory: diminished breath sounds with scattered coarse breath sounds  Cardiovascular: S1S2 regular, no murmurs  Abdomen: soft, not tender, not distended, positive BS  Genitourinary: deferred  Rectal: deferred  Musculoskeletal: no muscle tenderness, no joint swelling or tenderness  Neurological: intubated  Skin: no rashes      Labs:                        9.4    14.1  )-----------( 225      ( 15 Nov 2017 05:08 )             30.4     11-15    138  |  100  |  72<H>  ----------------------------<  167<H>  4.0   |  24  |  5.49<H>    Ca    <3.0<!!>      15 Nov 2017 05:08  Phos  5.1     11-15  Mg     1.5     11-15    TPro  4.0<L>  /  Alb  1.0<L>  /  TBili  2.5<H>  /  DBili  x   /  AST  1252<H>  /  ALT  1096<H>  /  AlkPhos  141<H>  11-15           Cultures:       Culture - Blood (collected 17 @ 03:29)  Source: .Blood Blood  only aerobic bottle  Preliminary Report (17 @ 09:01):    No growth to date.    Culture - Blood (collected 17 @ 22:50)  Source: .Blood Blood  Gram Stain (17 @ 06:15):    Growth in aerobic bottle: Gram Positive Cocci in Clusters    Growth in anaerobic bottle: Gram Positive Cocci in Clusters  Preliminary Report (17 @ 06:16):    Growth in aerobic bottle: Gram Positive Cocci in Clusters    ***Blood Panel PCR results on this specimen are available    approximately 3 hours after the Gram stain result.***    Gram stain, PCR, and/or culture results may not always    correspond due to difference in methodologies.    ************************************************************    This PCR assay was performed using [employer].    The following targets are tested for: Enterococcus,    vancomycin resistant enterococci, Listeria monocytogenes,    coagulase negative staphylococci, S. aureus,    methicillin resistant S. aureus, Streptococcus agalactiae    (Group B), S. pneumoniae, S. pyogenes (Group A),    Acinetobacter baumannii, Enterobacter cloacae, E. coli,    Klebsiella oxytoca, K. pneumoniae, Proteus sp.,    Serratia marcescens, Haemophilus influenzae,    Neisseria meningitidis, Pseudomonas aeruginosa, Candida    albicans, C. glabrata, C krusei, C parapsilosis,    C. tropicalis and the KPC resistance gene.    Growth in anaerobic bottle: Gram Positive Cocci in Clusters  Organism: Blood Culture PCR (17 @ 05:30)  Organism: Blood Culture PCR (17 @ 05:30)      -  Coagulase negative Staphylococcus: Detec      Method Type: PCR    Culture - Blood (collected 17 @ 17:12)  Source: .Blood None  Preliminary Report (17 @ 01:02):    No growth to date.    Culture - Blood (collected 17 @ 17:10)  Source: .Blood None  Preliminary Report (17 @ 01:02):    No growth to date.    Culture - Urine (collected 17 @ 17:05)  Source: .Urine Catheterized  Final Report (17 @ 17:03):    >100,000 CFU/ml Klebsiella pneumoniae  Organism: Klebsiella pneumoniae (17 @ 17:03)  Organism: Klebsiella pneumoniae (17 @ 17:03)      -  Amikacin: S <=8      -  Ampicillin: R >16      -  Ampicillin/Sulbactam: R >16/8      -  Aztreonam: S <=4      -  Cefazolin: S 8      -  Cefepime: S <=2      -  Cefoxitin: S <=4      -  Ceftazidime: S <=1      -  Ceftriaxone: S <=1      -  Ciprofloxacin: S <=0.5      -  Ertapenem: S <=0.5      -  Gentamicin: S <=1      -  Imipenem: S <=1      -  Levofloxacin: S <=1      -  Meropenem: S <=1      -  Nitrofurantoin: S <=32      -  Piperacillin/Tazobactam: S <=8      -  Tobramycin: S <=2      -  Trimethoprim/Sulfamethoxazole: S <=0.5/9.5      Method Type: ELA                                11.9   14.4  )-----------( 405      ( 2017 06:09 )             36.8     -    145  |  116<H>  |  51<H>  ----------------------------<  77  4.0   |  15<L>  |  3.03<H>    Ca    5.8<LL>      2017 06:09    TPro  5.4<L>  /  Alb  2.2<L>  /  TBili  1.9<H>  /  DBili  x   /  AST  280<H>  /  ALT  248<H>  /  AlkPhos  236<H>       LIVER FUNCTIONS - ( 2017 17:33 )  Alb: 2.2 g/dL / Pro: 5.4 gm/dL / ALK PHOS: 236 U/L / ALT: 248 U/L / AST: 280 U/L / GGT: x           Urinalysis Basic - ( 2017 17:05 )    Color: Deann / Appearance: very cloudy / S.025 / pH: x  Gluc: x / Ketone: Trace  / Bili: Moderate / Urobili: 4 mg/dL   Blood: x / Protein: 100 mg/dL / Nitrite: Positive   Leuk Esterase: Trace / RBC: 6-10 /HPF / WBC 0-2   Sq Epi: x / Non Sq Epi: Occasional / Bacteria: TNTC    Lipase, Serum (17 @ 17:33)    Lipase, Serum: >30140 U/L    Lactate, Blood (17 @ 06:09)    Lactate, Blood: 3.2 mmol/L    Labs:                        10.4   15.3  )-----------( 303      ( 2017 05:24 )             33.0     -    143  |  108  |  64<H>  ----------------------------<  108<H>  4.2   |  18<L>  |  4.61<H>    Ca    5.2<LL>      2017 05:24  Phos  3.5     -  Mg     1.4         TPro  4.2<L>  /  Alb  1.4<L>  /  TBili  2.2<H>  /  DBili  x   /  AST  1679<H>  /  ALT  1393<H>  /  AlkPhos  162<H>             Cultures:       Culture - Blood (collected 17 @ 03:29)  Source: .Blood Blood  only aerobic bottle  Preliminary Report (17 @ 09:01):    No growth to date.    Culture - Blood (collected 17 @ 22:50)  Source: .Blood Blood  Gram Stain (17 @ 06:15):    Growth in aerobic bottle: Gram Positive Cocci in Clusters    Growth in anaerobic bottle: Gram Positive Cocci in Clusters  Preliminary Report (17 @ 06:16):    Growth in aerobic bottle: Gram Positive Cocci in Clusters    ***Blood Panel PCR results on this specimen are available    approximately 3 hours after the Gram stain result.***    Gram stain, PCR, and/or culture results may not always    correspond due to difference in methodologies.    ************************************************************    This PCR assay was performed using [employer].    The following targets are tested for: Enterococcus,    vancomycin resistant enterococci, Listeria monocytogenes,    coagulase negative staphylococci, S. aureus,    methicillin resistant S. aureus, Streptococcus agalactiae    (Group B), S. pneumoniae, S. pyogenes (Group A),    Acinetobacter baumannii, Enterobacter cloacae, E. coli,    Klebsiella oxytoca, K. pneumoniae, Proteus sp.,    Serratia marcescens, Haemophilus influenzae,    Neisseria meningitidis, Pseudomonas aeruginosa, Candida    albicans, C. glabrata, C krusei, C parapsilosis,    C. tropicalis and the KPC resistance gene.    Growth in anaerobic bottle: Gram Positive Cocci in Clusters  Organism: Blood Culture PCR (17 @ 05:30)  Organism: Blood Culture PCR (17 @ 05:30)      -  Coagulase negative Staphylococcus: Detec      Method Type: PCR    Culture - Blood (collected 17 @ 17:12)  Source: .Blood None  Preliminary Report (17 @ 01:02):    No growth to date.    Culture - Blood (collected 17 @ 17:10)  Source: .Blood None  Preliminary Report (17 @ 01:02):    No growth to date.          Radiology:< from: CT Abdomen and Pelvis No Cont (17 @ 18:52) >    EXAM:  CT ABDOMEN AND PELVIS                          EXAM:  CT CHEST                            PROCEDURE DATE:  2017          INTERPRETATION:  CLINICAL INFORMATION: Elevated lipase level and elevated   lactate. Questionable fall.    COMPARISON: None    PROCEDURE:   CT of the Chest, Abdomen and Pelvis was performed without intravenous   contrast.   Intravenous contrast: None.  Oral contrast: None.  Sagittal and coronal reformats were performed.    FINDINGS:    CHEST:     LUNGS AND LARGE AIRWAYS: ET tube in good position.. Multiple right-sided   pulmonary nodules are seen. To right middle lobe nodules measure 7 mm.   Right lower lobe nodule measures 5 mm. 3 mm right lower lobe nodule is   seen. Additional tiny nodules in the right lower lobe are noted. Complete   atelectasis of the left lower lobe. Dependent atelectasis in the right   lower lobe.  Back PLEURA: No pleural effusion.  VESSELS: Within normal limits.  HEART: Heart size is normal.No pericardial effusion.  MEDIASTINUM AND MARI: No lymphadenopathy.  CHEST WALL AND LOWER NECK: Within normal limits.    ABDOMEN AND PELVIS: Study is limited by lack of intravenous contrast.    LIVER: Within normal limits.  BILE DUCTS: Normal caliber.  GALLBLADDER: Cholelithiasis.  SPLEEN: Within normal limits.  PANCREAS: There is diffuse enlargement of the pancreas with several areas   of high density in an around the pancreatic head concerning for   hemorrhage. Extensive peripancreatic inflammation is noted.   ADRENALS: Within normal limits.  KIDNEYS/URETERS: Bilateral renal cysts .    BLADDER: Collapsed around a fully catheter balloon.  REPRODUCTIVE ORGANS: No pelvic masses.    BOWEL: Rectal catheter in place No bowel obstruction. Sigmoid   diverticulosis. Normal appendix. Fluid-filled proximal colon. Thickened   loops of jejunum are likely reactive to the peripancreatic inflammation.  PERITONEUM/RETROPERITONEUM: Mild hyperdensity abdominopelvic ascites.  VESSELS: Atherosclerotic vascular calcifications.    ABDOMINAL WALL: Lipoma in distal left iliopsoas.  BONES: Degenerative changes of the spine.    IMPRESSION:    Redemonstration of right lung nodules measuring up to 7 mm.    Complete left lower lobe atelectasis.    Diffuse pancreatic enlargement with peripancreatic fluid, some of which   is hemorrhagic. Findings concerning for hemorrhagic pancreatitis.   Evaluation is limited by lack of intravenous contrast.    Reactive jejunitis.    < end of copied text >      Advanced directive addressed: DNR
HPI:  	  Patient is  66 y/o F PMHx COPD, CVA, HTN, Hypothyroid, pulm htn, CHF, on coumadin, asa, obesity admitted on  after being found at home on bathroom floor unresponsive; details unclear; history per medical record as patient is currently intubated, on pressor support and cannot provide history.  Today  patient remains on ventilatory and pressor support, still febrile  Today 11/15 patient remains on vent and pressor support, persistently febrile  Today  patient still on pressors and ventilatory support, persistently febrile    MEDICATIONS  (STANDING):  aspirin  chewable 81 milliGRAM(s) Oral daily  chlorhexidine 0.12% Liquid 5 milliLiter(s) Swish and Spit two times a day  dextrose 5%. 1000 milliLiter(s) (50 mL/Hr) IV Continuous <Continuous>  dextrose 50% Injectable 12.5 Gram(s) IV Push once  dextrose 50% Injectable 25 Gram(s) IV Push once  dextrose 50% Injectable 25 Gram(s) IV Push once  influenza   Vaccine 0.5 milliLiter(s) IntraMuscular once  meropenem IVPB 500 milliGRAM(s) IV Intermittent daily  norepinephrine Infusion 0.01 MICROgram(s)/kG/Min (1.701 mL/Hr) IV Continuous <Continuous>  pantoprazole  Injectable 40 milliGRAM(s) IV Push daily  propofol Infusion 5 MICROgram(s)/kG/Min (2.721 mL/Hr) IV Continuous <Continuous>  sodium chloride 0.9%. 1000 milliLiter(s) (100 mL/Hr) IV Continuous <Continuous>  vasopressin Infusion 0.04 Unit(s)/Min (2.4 mL/Hr) IV Continuous <Continuous>    MEDICATIONS  (PRN):  acetaminophen    Suspension 650 milliGRAM(s) Oral every 6 hours PRN pain and temp  dextrose Gel 1 Dose(s) Oral once PRN Blood Glucose LESS THAN 70 milliGRAM(s)/deciLiter  glucagon  Injectable 1 milliGRAM(s) IntraMuscular once PRN Glucose <70 milliGRAM(s)/deciLiter  LORazepam   Injectable 1 milliGRAM(s) IV Push every 2 hours PRN Agitation      Vital Signs Last 24 Hrs  T(C): 37.8 (2017 08:30), Max: 38.3 (15 Nov 2017 17:00)  T(F): 100.1 (2017 08:30), Max: 101 (15 Nov 2017 17:00)  HR: 84 (2017 13:00) (75 - 99)  BP: 119/49 (2017 13:00) (81/38 - 145/66)  BP(mean): 72 (2017 13:00) (49 - 86)  RR: 19 (2017 13:00) (17 - 23)  SpO2: 100% (2017 12:30) (100% - 100%)    Physical Exam:          Constitutional: frail looking  HEENT: edema of face, intubated  Neck: supple  Respiratory: diminished breath sounds with scattered coarse breath sounds  Cardiovascular: S1S2 regular, no murmurs  Abdomen: soft, not tender, not distended, positive BS  Genitourinary: deferred  Rectal: deferred  Musculoskeletal: no muscle tenderness, no joint swelling or tenderness  Neurological: intubated  Skin: no rashes      Labs:      Labs:                        9.3    23.1  )-----------( 189      ( 2017 05:56 )             29.0     11-16    134<L>  |  101  |  76<H>  ----------------------------<  54<L>  5.1   |  19<L>  |  6.03<H>    Ca    5.5<LL>      2017 05:56  Phos  5.1     11-15  Mg     1.5     11-15    TPro  4.5<L>  /  Alb  0.9<L>  /  TBili  2.3<H>  /  DBili  x   /  AST  840<H>  /  ALT  743<H>  /  AlkPhos  140<H>             Cultures:                             9.4    14.1  )-----------( 225      ( 15 Nov 2017 05:08 )             30.4     11-15    138  |  100  |  72<H>  ----------------------------<  167<H>  4.0   |  24  |  5.49<H>    Ca    <3.0<!!>      15 Nov 2017 05:08  Phos  5.1     11-15  Mg     1.5     11-15    TPro  4.0<L>  /  Alb  1.0<L>  /  TBili  2.5<H>  /  DBili  x   /  AST  1252<H>  /  ALT  1096<H>  /  AlkPhos  141<H>  11-15           Cultures:       Culture - Blood (collected 17 @ 03:29)  Source: .Blood Blood  only aerobic bottle  Preliminary Report (17 @ 09:01):    No growth to date.    Culture - Blood (collected 17 @ 22:50)  Source: .Blood Blood  Gram Stain (17 @ 06:15):    Growth in aerobic bottle: Gram Positive Cocci in Clusters    Growth in anaerobic bottle: Gram Positive Cocci in Clusters  Preliminary Report (17 @ 06:16):    Growth in aerobic bottle: Gram Positive Cocci in Clusters    ***Blood Panel PCR results on this specimen are available    approximately 3 hours after the Gram stain result.***    Gram stain, PCR, and/or culture results may not always    correspond due to difference in methodologies.    ************************************************************    This PCR assay was performed using [employer].    The following targets are tested for: Enterococcus,    vancomycin resistant enterococci, Listeria monocytogenes,    coagulase negative staphylococci, S. aureus,    methicillin resistant S. aureus, Streptococcus agalactiae    (Group B), S. pneumoniae, S. pyogenes (Group A),    Acinetobacter baumannii, Enterobacter cloacae, E. coli,    Klebsiella oxytoca, K. pneumoniae, Proteus sp.,    Serratia marcescens, Haemophilus influenzae,    Neisseria meningitidis, Pseudomonas aeruginosa, Candida    albicans, C. glabrata, C krusei, C parapsilosis,    C. tropicalis and the KPC resistance gene.    Growth in anaerobic bottle: Gram Positive Cocci in Clusters  Organism: Blood Culture PCR (17 @ 05:30)  Organism: Blood Culture PCR (17 @ 05:30)      -  Coagulase negative Staphylococcus: Detec      Method Type: PCR    Culture - Blood (collected 17 @ 17:12)  Source: .Blood None  Preliminary Report (17 @ 01:02):    No growth to date.    Culture - Blood (collected 17 @ 17:10)  Source: .Blood None  Preliminary Report (17 @ 01:02):    No growth to date.    Culture - Urine (collected 17 @ 17:05)  Source: .Urine Catheterized  Final Report (17 @ 17:03):    >100,000 CFU/ml Klebsiella pneumoniae  Organism: Klebsiella pneumoniae (17 @ 17:03)  Organism: Klebsiella pneumoniae (17 @ 17:03)      -  Amikacin: S <=8      -  Ampicillin: R >16      -  Ampicillin/Sulbactam: R >16/8      -  Aztreonam: S <=4      -  Cefazolin: S 8      -  Cefepime: S <=2      -  Cefoxitin: S <=4      -  Ceftazidime: S <=1      -  Ceftriaxone: S <=1      -  Ciprofloxacin: S <=0.5      -  Ertapenem: S <=0.5      -  Gentamicin: S <=1      -  Imipenem: S <=1      -  Levofloxacin: S <=1      -  Meropenem: S <=1      -  Nitrofurantoin: S <=32      -  Piperacillin/Tazobactam: S <=8      -  Tobramycin: S <=2      -  Trimethoprim/Sulfamethoxazole: S <=0.5/9.5      Method Type: ELA                                11.9   14.4  )-----------( 405      ( 2017 06:09 )             36.8     11-    145  |  116<H>  |  51<H>  ----------------------------<  77  4.0   |  15<L>  |  3.03<H>    Ca    5.8<LL>      2017 06:09    TPro  5.4<L>  /  Alb  2.2<L>  /  TBili  1.9<H>  /  DBili  x   /  AST  280<H>  /  ALT  248<H>  /  AlkPhos  236<H>  12     LIVER FUNCTIONS - ( 2017 17:33 )  Alb: 2.2 g/dL / Pro: 5.4 gm/dL / ALK PHOS: 236 U/L / ALT: 248 U/L / AST: 280 U/L / GGT: x           Urinalysis Basic - ( 2017 17:05 )    Color: Deann / Appearance: very cloudy / S.025 / pH: x  Gluc: x / Ketone: Trace  / Bili: Moderate / Urobili: 4 mg/dL   Blood: x / Protein: 100 mg/dL / Nitrite: Positive   Leuk Esterase: Trace / RBC: 6-10 /HPF / WBC 0-2   Sq Epi: x / Non Sq Epi: Occasional / Bacteria: TNTC    Lipase, Serum (17 @ 17:33)    Lipase, Serum: >46856 U/L    Lactate, Blood (17 @ 06:09)    Lactate, Blood: 3.2 mmol/L    Labs:                        10.4   15.3  )-----------( 303      ( 2017 05:24 )             33.0         143  |  108  |  64<H>  ----------------------------<  108<H>  4.2   |  18<L>  |  4.61<H>    Ca    5.2<LL>      2017 05:24  Phos  3.5       Mg     1.4         TPro  4.2<L>  /  Alb  1.4<L>  /  TBili  2.2<H>  /  DBili  x   /  AST  1679<H>  /  ALT  1393<H>  /  AlkPhos  162<H>             Cultures:       Culture - Blood (collected 17 @ 03:29)  Source: .Blood Blood  only aerobic bottle  Preliminary Report (17 @ 09:01):    No growth to date.    Culture - Blood (collected 17 @ 22:50)  Source: .Blood Blood  Gram Stain (17 @ 06:15):    Growth in aerobic bottle: Gram Positive Cocci in Clusters    Growth in anaerobic bottle: Gram Positive Cocci in Clusters  Preliminary Report (17 @ 06:16):    Growth in aerobic bottle: Gram Positive Cocci in Clusters    ***Blood Panel PCR results on this specimen are available    approximately 3 hours after the Gram stain result.***    Gram stain, PCR, and/or culture results may not always    correspond due to difference in methodologies.    ************************************************************    This PCR assay was performed using [employer].    The following targets are tested for: Enterococcus,    vancomycin resistant enterococci, Listeria monocytogenes,    coagulase negative staphylococci, S. aureus,    methicillin resistant S. aureus, Streptococcus agalactiae    (Group B), S. pneumoniae, S. pyogenes (Group A),    Acinetobacter baumannii, Enterobacter cloacae, E. coli,    Klebsiella oxytoca, K. pneumoniae, Proteus sp.,    Serratia marcescens, Haemophilus influenzae,    Neisseria meningitidis, Pseudomonas aeruginosa, Candida    albicans, C. glabrata, C krusei, C parapsilosis,    C. tropicalis and the KPC resistance gene.    Growth in anaerobic bottle: Gram Positive Cocci in Clusters  Organism: Blood Culture PCR (17 @ 05:30)  Organism: Blood Culture PCR (17 @ 05:30)      -  Coagulase negative Staphylococcus: Detec      Method Type: PCR    Culture - Blood (collected 17 @ 17:12)  Source: .Blood None  Preliminary Report (17 @ 01:02):    No growth to date.    Culture - Blood (collected 17 @ 17:10)  Source: .Blood None  Preliminary Report (17 @ 01:02):    No growth to date.          Radiology:< from: CT Abdomen and Pelvis No Cont (17 @ 18:52) >    EXAM:  CT ABDOMEN AND PELVIS                          EXAM:  CT CHEST                            PROCEDURE DATE:  2017          INTERPRETATION:  CLINICAL INFORMATION: Elevated lipase level and elevated   lactate. Questionable fall.    COMPARISON: None    PROCEDURE:   CT of the Chest, Abdomen and Pelvis was performed without intravenous   contrast.   Intravenous contrast: None.  Oral contrast: None.  Sagittal and coronal reformats were performed.    FINDINGS:    CHEST:     LUNGS AND LARGE AIRWAYS: ET tube in good position.. Multiple right-sided   pulmonary nodules are seen. To right middle lobe nodules measure 7 mm.   Right lower lobe nodule measures 5 mm. 3 mm right lower lobe nodule is   seen. Additional tiny nodules in the right lower lobe are noted. Complete   atelectasis of the left lower lobe. Dependent atelectasis in the right   lower lobe.  Back PLEURA: No pleural effusion.  VESSELS: Within normal limits.  HEART: Heart size is normal.No pericardial effusion.  MEDIASTINUM AND MARI: No lymphadenopathy.  CHEST WALL AND LOWER NECK: Within normal limits.    ABDOMEN AND PELVIS: Study is limited by lack of intravenous contrast.    LIVER: Within normal limits.  BILE DUCTS: Normal caliber.  GALLBLADDER: Cholelithiasis.  SPLEEN: Within normal limits.  PANCREAS: There is diffuse enlargement of the pancreas with several areas   of high density in an around the pancreatic head concerning for   hemorrhage. Extensive peripancreatic inflammation is noted.   ADRENALS: Within normal limits.  KIDNEYS/URETERS: Bilateral renal cysts .    BLADDER: Collapsed around a fully catheter balloon.  REPRODUCTIVE ORGANS: No pelvic masses.    BOWEL: Rectal catheter in place No bowel obstruction. Sigmoid   diverticulosis. Normal appendix. Fluid-filled proximal colon. Thickened   loops of jejunum are likely reactive to the peripancreatic inflammation.  PERITONEUM/RETROPERITONEUM: Mild hyperdensity abdominopelvic ascites.  VESSELS: Atherosclerotic vascular calcifications.    ABDOMINAL WALL: Lipoma in distal left iliopsoas.  BONES: Degenerative changes of the spine.    IMPRESSION:    Redemonstration of right lung nodules measuring up to 7 mm.    Complete left lower lobe atelectasis.    Diffuse pancreatic enlargement with peripancreatic fluid, some of which   is hemorrhagic. Findings concerning for hemorrhagic pancreatitis.   Evaluation is limited by lack of intravenous contrast.    Reactive jejunitis.    < end of copied text >      Advanced directive addressed: DNR
HPI:  	  Patient is  68 y/o F PMHx COPD, CVA, HTN, Hypothyroid, pulm htn, CHF, on coumadin, asa, obesity admitted on  after being found at home on bathroom floor unresponsive; details unclear; history per medical record as patient is currently intubated, on pressor support and cannot provide history.  Today  patient remains on ventilatory and pressor support, still febrile  Today 11/15 patient remains on vent and pressor support, persistently febrile  Today  patient still on pressors and ventilatory support, persistently febrile  Today  patient on pressors and ventilatory support, intermittently febrile    MEDICATIONS  (STANDING):  aspirin  chewable 81 milliGRAM(s) Oral daily  chlorhexidine 0.12% Liquid 5 milliLiter(s) Swish and Spit two times a day  dextrose 5% + sodium chloride 0.9%. 1000 milliLiter(s) (100 mL/Hr) IV Continuous <Continuous>  dextrose 5%. 1000 milliLiter(s) (50 mL/Hr) IV Continuous <Continuous>  dextrose 50% Injectable 12.5 Gram(s) IV Push once  dextrose 50% Injectable 25 Gram(s) IV Push once  dextrose 50% Injectable 25 Gram(s) IV Push once  influenza   Vaccine 0.5 milliLiter(s) IntraMuscular once  levothyroxine Injectable 90 MICROGram(s) IV Push daily  meropenem IVPB 500 milliGRAM(s) IV Intermittent daily  micafungin IVPB      norepinephrine Infusion 0.01 MICROgram(s)/kG/Min (1.701 mL/Hr) IV Continuous <Continuous>  pantoprazole  Injectable 40 milliGRAM(s) IV Push daily  vasopressin Infusion 0.04 Unit(s)/Min (2.4 mL/Hr) IV Continuous <Continuous>    MEDICATIONS  (PRN):  acetaminophen    Suspension 650 milliGRAM(s) Oral every 6 hours PRN pain and temp  dextrose Gel 1 Dose(s) Oral once PRN Blood Glucose LESS THAN 70 milliGRAM(s)/deciLiter  glucagon  Injectable 1 milliGRAM(s) IntraMuscular once PRN Glucose <70 milliGRAM(s)/deciLiter  LORazepam   Injectable 1 milliGRAM(s) IV Push every 2 hours PRN Agitation      Vital Signs Last 24 Hrs  T(C): 37.2 (2017 06:00), Max: 37.9 (2017 22:00)  T(F): 98.9 (2017 06:00), Max: 100.3 (2017 22:00)  HR: 76 (2017 10:30) (71 - 90)  BP: 123/47 (2017 10:30) (95/43 - 162/57)  BP(mean): 72 (2017 10:30) (54 - 81)  RR: 18 (2017 10:30) (13 - 23)  SpO2: 100% (2017 10:30) (99% - 100%)    Physical Exam:        Constitutional: frail looking  HEENT: edema of face, intubated  Neck: supple  Respiratory: diminished breath sounds with scattered coarse breath sounds  Cardiovascular: S1S2 regular, no murmurs  Abdomen: soft, not tender, not distended, positive BS  Genitourinary: deferred  Rectal: deferred  Musculoskeletal: no muscle tenderness, no joint swelling or tenderness  Neurological: intubated  Skin: no rashes      Labs:      Labs:  Labs:                        8.6    24.3  )-----------( 146      ( 2017 05:04 )             25.5     11-17    136  |  101  |  87<H>  ----------------------------<  107<H>  4.2   |  23  |  6.07<H>    Ca    5.5<LL>      2017 05:04  Phos  7.5     11-17  Mg     1.8     11-17    TPro  4.6<L>  /  Alb  1.0<L>  /  TBili  2.1<H>  /  DBili  x   /  AST  506<H>  /  ALT  403<H>  /  AlkPhos  115  11-17                                   9.3    23.1  )-----------( 189      ( 2017 05:56 )             29.0     11-16    134<L>  |  101  |  76<H>  ----------------------------<  54<L>  5.1   |  19<L>  |  6.03<H>    Ca    5.5<LL>      2017 05:56  Phos  5.1     11-15  Mg     1.5     11-15    TPro  4.5<L>  /  Alb  0.9<L>  /  TBili  2.3<H>  /  DBili  x   /  AST  840<H>  /  ALT  743<H>  /  AlkPhos  140<H>             Cultures:                             9.4    14.1  )-----------( 225      ( 15 Nov 2017 05:08 )             30.4     11-15    138  |  100  |  72<H>  ----------------------------<  167<H>  4.0   |  24  |  5.49<H>    Ca    <3.0<!!>      15 Nov 2017 05:08  Phos  5.1     11-15  Mg     1.5     11-15    TPro  4.0<L>  /  Alb  1.0<L>  /  TBili  2.5<H>  /  DBili  x   /  AST  1252<H>  /  ALT  1096<H>  /  AlkPhos  141<H>  11-15           Cultures:       Culture - Blood (collected 17 @ 03:29)  Source: .Blood Blood  only aerobic bottle  Preliminary Report (17 @ 09:01):    No growth to date.    Culture - Blood (collected 17 @ 22:50)  Source: .Blood Blood  Gram Stain (17 @ 06:15):    Growth in aerobic bottle: Gram Positive Cocci in Clusters    Growth in anaerobic bottle: Gram Positive Cocci in Clusters  Preliminary Report (17 @ 06:16):    Growth in aerobic bottle: Gram Positive Cocci in Clusters    ***Blood Panel PCR results on this specimen are available    approximately 3 hours after the Gram stain result.***    Gram stain, PCR, and/or culture results may not always    correspond due to difference in methodologies.    ************************************************************    This PCR assay was performed using [facility].    The following targets are tested for: Enterococcus,    vancomycin resistant enterococci, Listeria monocytogenes,    coagulase negative staphylococci, S. aureus,    methicillin resistant S. aureus, Streptococcus agalactiae    (Group B), S. pneumoniae, S. pyogenes (Group A),    Acinetobacter baumannii, Enterobacter cloacae, E. coli,    Klebsiella oxytoca, K. pneumoniae, Proteus sp.,    Serratia marcescens, Haemophilus influenzae,    Neisseria meningitidis, Pseudomonas aeruginosa, Candida    albicans, C. glabrata, C krusei, C parapsilosis,    C. tropicalis and the KPC resistance gene.    Growth in anaerobic bottle: Gram Positive Cocci in Clusters  Organism: Blood Culture PCR (17 @ 05:30)  Organism: Blood Culture PCR (17 @ 05:30)      -  Coagulase negative Staphylococcus: Detec      Method Type: PCR    Culture - Blood (collected 17 @ 17:12)  Source: .Blood None  Preliminary Report (17 @ 01:02):    No growth to date.    Culture - Blood (collected 17 @ 17:10)  Source: .Blood None  Preliminary Report (17 @ 01:02):    No growth to date.    Culture - Urine (collected 17 @ 17:05)  Source: .Urine Catheterized  Final Report (17 @ 17:03):    >100,000 CFU/ml Klebsiella pneumoniae  Organism: Klebsiella pneumoniae (17 @ 17:03)  Organism: Klebsiella pneumoniae (17 @ 17:03)      -  Amikacin: S <=8      -  Ampicillin: R >16      -  Ampicillin/Sulbactam: R >16/8      -  Aztreonam: S <=4      -  Cefazolin: S 8      -  Cefepime: S <=2      -  Cefoxitin: S <=4      -  Ceftazidime: S <=1      -  Ceftriaxone: S <=1      -  Ciprofloxacin: S <=0.5      -  Ertapenem: S <=0.5      -  Gentamicin: S <=1      -  Imipenem: S <=1      -  Levofloxacin: S <=1      -  Meropenem: S <=1      -  Nitrofurantoin: S <=32      -  Piperacillin/Tazobactam: S <=8      -  Tobramycin: S <=2      -  Trimethoprim/Sulfamethoxazole: S <=0.5/9.5      Method Type: ELA                                11.9   14.4  )-----------( 405      ( 2017 06:09 )             36.8     11-13    145  |  116<H>  |  51<H>  ----------------------------<  77  4.0   |  15<L>  |  3.03<H>    Ca    5.8<LL>      2017 06:09    TPro  5.4<L>  /  Alb  2.2<L>  /  TBili  1.9<H>  /  DBili  x   /  AST  280<H>  /  ALT  248<H>  /  AlkPhos  236<H>  11-12     LIVER FUNCTIONS - ( 2017 17:33 )  Alb: 2.2 g/dL / Pro: 5.4 gm/dL / ALK PHOS: 236 U/L / ALT: 248 U/L / AST: 280 U/L / GGT: x           Urinalysis Basic - ( 2017 17:05 )    Color: Deann / Appearance: very cloudy / S.025 / pH: x  Gluc: x / Ketone: Trace  / Bili: Moderate / Urobili: 4 mg/dL   Blood: x / Protein: 100 mg/dL / Nitrite: Positive   Leuk Esterase: Trace / RBC: 6-10 /HPF / WBC 0-2   Sq Epi: x / Non Sq Epi: Occasional / Bacteria: TNTC    Lipase, Serum (.12.17 @ 17:33)    Lipase, Serum: >18727 U/L    Lactate, Blood (.13.17 @ 06:09)    Lactate, Blood: 3.2 mmol/L    Labs:                        10.4   15.3  )-----------( 303      ( 2017 05:24 )             33.0     -14    143  |  108  |  64<H>  ----------------------------<  108<H>  4.2   |  18<L>  |  4.61<H>    Ca    5.2<LL>      2017 05:24  Phos  3.5     11-14  Mg     1.4         TPro  4.2<L>  /  Alb  1.4<L>  /  TBili  2.2<H>  /  DBili  x   /  AST  1679<H>  /  ALT  1393<H>  /  AlkPhos  162<H>             Cultures:       Culture - Blood (collected 17 @ 03:29)  Source: .Blood Blood  only aerobic bottle  Preliminary Report (17 @ 09:01):    No growth to date.    Culture - Blood (collected 17 @ 22:50)  Source: .Blood Blood  Gram Stain (17 @ 06:15):    Growth in aerobic bottle: Gram Positive Cocci in Clusters    Growth in anaerobic bottle: Gram Positive Cocci in Clusters  Preliminary Report (17 @ 06:16):    Growth in aerobic bottle: Gram Positive Cocci in Clusters    ***Blood Panel PCR results on this specimen are available    approximately 3 hours after the Gram stain result.***    Gram stain, PCR, and/or culture results may not always    correspond due to difference in methodologies.    ************************************************************    This PCR assay was performed using [facility].    The following targets are tested for: Enterococcus,    vancomycin resistant enterococci, Listeria monocytogenes,    coagulase negative staphylococci, S. aureus,    methicillin resistant S. aureus, Streptococcus agalactiae    (Group B), S. pneumoniae, S. pyogenes (Group A),    Acinetobacter baumannii, Enterobacter cloacae, E. coli,    Klebsiella oxytoca, K. pneumoniae, Proteus sp.,    Serratia marcescens, Haemophilus influenzae,    Neisseria meningitidis, Pseudomonas aeruginosa, Candida    albicans, C. glabrata, C krusei, C parapsilosis,    C. tropicalis and the KPC resistance gene.    Growth in anaerobic bottle: Gram Positive Cocci in Clusters  Organism: Blood Culture PCR (17 @ 05:30)  Organism: Blood Culture PCR (17 @ 05:30)      -  Coagulase negative Staphylococcus: Detec      Method Type: PCR    Culture - Blood (collected 17 @ 17:12)  Source: .Blood None  Preliminary Report (17 @ 01:02):    No growth to date.    Culture - Blood (collected 17 @ 17:10)  Source: .Blood None  Preliminary Report (17 @ 01:02):    No growth to date.          Radiology:< from: CT Abdomen and Pelvis No Cont (17 @ 18:52) >    EXAM:  CT ABDOMEN AND PELVIS                          EXAM:  CT CHEST                            PROCEDURE DATE:  2017          INTERPRETATION:  CLINICAL INFORMATION: Elevated lipase level and elevated   lactate. Questionable fall.    COMPARISON: None    PROCEDURE:   CT of the Chest, Abdomen and Pelvis was performed without intravenous   contrast.   Intravenous contrast: None.  Oral contrast: None.  Sagittal and coronal reformats were performed.    FINDINGS:    CHEST:     LUNGS AND LARGE AIRWAYS: ET tube in good position.. Multiple right-sided   pulmonary nodules are seen. To right middle lobe nodules measure 7 mm.   Right lower lobe nodule measures 5 mm. 3 mm right lower lobe nodule is   seen. Additional tiny nodules in the right lower lobe are noted. Complete   atelectasis of the left lower lobe. Dependent atelectasis in the right   lower lobe.  Back PLEURA: No pleural effusion.  VESSELS: Within normal limits.  HEART: Heart size is normal.No pericardial effusion.  MEDIASTINUM AND MARI: No lymphadenopathy.  CHEST WALL AND LOWER NECK: Within normal limits.    ABDOMEN AND PELVIS: Study is limited by lack of intravenous contrast.    LIVER: Within normal limits.  BILE DUCTS: Normal caliber.  GALLBLADDER: Cholelithiasis.  SPLEEN: Within normal limits.  PANCREAS: There is diffuse enlargement of the pancreas with several areas   of high density in an around the pancreatic head concerning for   hemorrhage. Extensive peripancreatic inflammation is noted.   ADRENALS: Within normal limits.  KIDNEYS/URETERS: Bilateral renal cysts .    BLADDER: Collapsed around a fully catheter balloon.  REPRODUCTIVE ORGANS: No pelvic masses.    BOWEL: Rectal catheter in place No bowel obstruction. Sigmoid   diverticulosis. Normal appendix. Fluid-filled proximal colon. Thickened   loops of jejunum are likely reactive to the peripancreatic inflammation.  PERITONEUM/RETROPERITONEUM: Mild hyperdensity abdominopelvic ascites.  VESSELS: Atherosclerotic vascular calcifications.    ABDOMINAL WALL: Lipoma in distal left iliopsoas.  BONES: Degenerative changes of the spine.    IMPRESSION:    Redemonstration of right lung nodules measuring up to 7 mm.    Complete left lower lobe atelectasis.    Diffuse pancreatic enlargement with peripancreatic fluid, some of which   is hemorrhagic. Findings concerning for hemorrhagic pancreatitis.   Evaluation is limited by lack of intravenous contrast.    Reactive jejunitis.    < end of copied text >      Advanced directive addressed: DNR
HPI:  	  Patient is  68 y/o F PMHx COPD, CVA, HTN, Hypothyroid, pulm htn, CHF, on coumadin, asa, obesity admitted on  after being found at home on bathroom floor unresponsive; details unclear; history per medical record as patient is currently intubated, on pressor support and cannot provide history.  Today  patient remains on ventilatory and pressor support, still febrile  Today 11/15 patient remains on vent and pressor support, persistently febrile  Today  patient still on pressors and ventilatory support, persistently febrile  Today  patient on pressors and ventilatory support, intermittently febrile  Today  patient unresponsive, on life support    MEDICATIONS  (STANDING):  aspirin  chewable 81 milliGRAM(s) Oral daily  chlorhexidine 0.12% Liquid 5 milliLiter(s) Swish and Spit two times a day  dextrose 5% + sodium chloride 0.9%. 1000 milliLiter(s) (100 mL/Hr) IV Continuous <Continuous>  dextrose 5%. 1000 milliLiter(s) (50 mL/Hr) IV Continuous <Continuous>  dextrose 50% Injectable 12.5 Gram(s) IV Push once  dextrose 50% Injectable 25 Gram(s) IV Push once  dextrose 50% Injectable 25 Gram(s) IV Push once  influenza   Vaccine 0.5 milliLiter(s) IntraMuscular once  levothyroxine Injectable 90 MICROGram(s) IV Push daily  meropenem IVPB 500 milliGRAM(s) IV Intermittent daily  micafungin IVPB      micafungin IVPB 100 milliGRAM(s) IV Intermittent every 24 hours  norepinephrine Infusion 0.01 MICROgram(s)/kG/Min (1.701 mL/Hr) IV Continuous <Continuous>  pantoprazole  Injectable 40 milliGRAM(s) IV Push daily  vasopressin Infusion 0.04 Unit(s)/Min (2.4 mL/Hr) IV Continuous <Continuous>    MEDICATIONS  (PRN):  acetaminophen    Suspension 650 milliGRAM(s) Oral every 6 hours PRN pain and temp  dextrose Gel 1 Dose(s) Oral once PRN Blood Glucose LESS THAN 70 milliGRAM(s)/deciLiter  glucagon  Injectable 1 milliGRAM(s) IntraMuscular once PRN Glucose <70 milliGRAM(s)/deciLiter  LORazepam   Injectable 1 milliGRAM(s) IV Push every 2 hours PRN Agitation      Vital Signs Last 24 Hrs  T(C): 37.7 (2017 05:00), Max: 37.7 (2017 05:00)  T(F): 99.8 (2017 05:00), Max: 99.8 (2017 05:00)  HR: 77 (2017 10:00) (74 - 90)  BP: 127/42 (2017 09:30) (97/45 - 157/45)  BP(mean): 61 (2017 09:30) (48 - 81)  RR: 19 (2017 10:00) (0 - 23)  SpO2: 100% (2017 06:30) (100% - 100%)    Physical Exam:          Constitutional: frail looking  HEENT: edema of face, intubated, right pupil dilated  Neck: supple  Respiratory: diminished breath sounds with scattered coarse breath sounds  Cardiovascular: S1S2 regular, no murmurs  Abdomen: soft, not tender,  distended  Genitourinary: deferred  Rectal: deferred  Musculoskeletal: no muscle tenderness, no joint swelling or tenderness  Neurological: intubated  Skin: no rashes      Labs:      Labs:  Labs:             Labs:                        7.9    34.4  )-----------( 181      ( 2017 05:19 )             24.0     -    140  |  104  |  93  ----------------------------<  96  4.4   |  19  |  6.09    Ca    6.0      2017 05:19  Phos  7.5     11-17  Mg     1.8     -    TPro  4.5  /  Alb  1.0  /  TBili  2.4  /  DBili  x   /  AST  394<H>  /  ALT  239<H>  /  AlkPhos  118             Cultures:                            8.6    24.3  )-----------( 146      ( 2017 05:04 )             25.5     11-    136  |  101  |  87<H>  ----------------------------<  107<H>  4.2   |  23  |  6.07<H>    Ca    5.5<LL>      2017 05:04  Phos  7.5     11-17  Mg     1.8     -17    TPro  4.6<L>  /  Alb  1.0<L>  /  TBili  2.1<H>  /  DBili  x   /  AST  506<H>  /  ALT  403<H>  /  AlkPhos  115                                     9.3    23.1  )-----------( 189      ( 2017 05:56 )             29.0     -16    134<L>  |  101  |  76<H>  ----------------------------<  54<L>  5.1   |  19<L>  |  6.03<H>    Ca    5.5<LL>      2017 05:56  Phos  5.1     11-15  Mg     1.5     11-15    TPro  4.5<L>  /  Alb  0.9<L>  /  TBili  2.3<H>  /  DBili  x   /  AST  840<H>  /  ALT  743<H>  /  AlkPhos  140<H>             Cultures:                             9.4    14.1  )-----------( 225      ( 15 Nov 2017 05:08 )             30.4     11-15    138  |  100  |  72<H>  ----------------------------<  167<H>  4.0   |  24  |  5.49<H>    Ca    <3.0<!!>      15 Nov 2017 05:08  Phos  5.1     11-15  Mg     1.5     -15    TPro  4.0<L>  /  Alb  1.0<L>  /  TBili  2.5<H>  /  DBili  x   /  AST  1252<H>  /  ALT  1096<H>  /  AlkPhos  141<H>  11-15           Cultures:       Culture - Blood (collected 17 @ 03:29)  Source: .Blood Blood  only aerobic bottle  Preliminary Report (17 @ 09:01):    No growth to date.    Culture - Blood (collected 17 @ 22:50)  Source: .Blood Blood  Gram Stain (17 @ 06:15):    Growth in aerobic bottle: Gram Positive Cocci in Clusters    Growth in anaerobic bottle: Gram Positive Cocci in Clusters  Preliminary Report (17 @ 06:16):    Growth in aerobic bottle: Gram Positive Cocci in Clusters    ***Blood Panel PCR results on this specimen are available    approximately 3 hours after the Gram stain result.***    Gram stain, PCR, and/or culture results may not always    correspond due to difference in methodologies.    ************************************************************    This PCR assay was performed using [employer].    The following targets are tested for: Enterococcus,    vancomycin resistant enterococci, Listeria monocytogenes,    coagulase negative staphylococci, S. aureus,    methicillin resistant S. aureus, Streptococcus agalactiae    (Group B), S. pneumoniae, S. pyogenes (Group A),    Acinetobacter baumannii, Enterobacter cloacae, E. coli,    Klebsiella oxytoca, K. pneumoniae, Proteus sp.,    Serratia marcescens, Haemophilus influenzae,    Neisseria meningitidis, Pseudomonas aeruginosa, Candida    albicans, C. glabrata, C krusei, C parapsilosis,    C. tropicalis and the KPC resistance gene.    Growth in anaerobic bottle: Gram Positive Cocci in Clusters  Organism: Blood Culture PCR (17 @ 05:30)  Organism: Blood Culture PCR (17 @ 05:30)      -  Coagulase negative Staphylococcus: Detec      Method Type: PCR    Culture - Blood (collected 17 @ 17:12)  Source: .Blood None  Preliminary Report (17 @ 01:02):    No growth to date.    Culture - Blood (collected 17 @ 17:10)  Source: .Blood None  Preliminary Report (17 @ 01:02):    No growth to date.    Culture - Urine (collected 17 @ 17:05)  Source: .Urine Catheterized  Final Report (17 @ 17:03):    >100,000 CFU/ml Klebsiella pneumoniae  Organism: Klebsiella pneumoniae (17 @ 17:03)  Organism: Klebsiella pneumoniae (17 @ 17:03)      -  Amikacin: S <=8      -  Ampicillin: R >16      -  Ampicillin/Sulbactam: R >16/8      -  Aztreonam: S <=4      -  Cefazolin: S 8      -  Cefepime: S <=2      -  Cefoxitin: S <=4      -  Ceftazidime: S <=1      -  Ceftriaxone: S <=1      -  Ciprofloxacin: S <=0.5      -  Ertapenem: S <=0.5      -  Gentamicin: S <=1      -  Imipenem: S <=1      -  Levofloxacin: S <=1      -  Meropenem: S <=1      -  Nitrofurantoin: S <=32      -  Piperacillin/Tazobactam: S <=8      -  Tobramycin: S <=2      -  Trimethoprim/Sulfamethoxazole: S <=0.5/9.5      Method Type: ELA                                11.9   14.4  )-----------( 405      ( 2017 06:09 )             36.8     11-13    145  |  116<H>  |  51<H>  ----------------------------<  77  4.0   |  15<L>  |  3.03<H>    Ca    5.8<LL>      2017 06:09    TPro  5.4<L>  /  Alb  2.2<L>  /  TBili  1.9<H>  /  DBili  x   /  AST  280<H>  /  ALT  248<H>  /  AlkPhos  236<H>  11-12     LIVER FUNCTIONS - ( 2017 17:33 )  Alb: 2.2 g/dL / Pro: 5.4 gm/dL / ALK PHOS: 236 U/L / ALT: 248 U/L / AST: 280 U/L / GGT: x           Urinalysis Basic - ( 2017 17:05 )    Color: Deann / Appearance: very cloudy / S.025 / pH: x  Gluc: x / Ketone: Trace  / Bili: Moderate / Urobili: 4 mg/dL   Blood: x / Protein: 100 mg/dL / Nitrite: Positive   Leuk Esterase: Trace / RBC: 6-10 /HPF / WBC 0-2   Sq Epi: x / Non Sq Epi: Occasional / Bacteria: TNTC    Lipase, Serum (17 @ 17:33)    Lipase, Serum: >27210 U/L    Lactate, Blood (17 @ 06:09)    Lactate, Blood: 3.2 mmol/L    Labs:                        10.4   15.3  )-----------( 303      ( 2017 05:24 )             33.0         143  |  108  |  64<H>  ----------------------------<  108<H>  4.2   |  18<L>  |  4.61<H>    Ca    5.2<LL>      2017 05:24  Phos  3.5       Mg     1.4         TPro  4.2<L>  /  Alb  1.4<L>  /  TBili  2.2<H>  /  DBili  x   /  AST  1679<H>  /  ALT  1393<H>  /  AlkPhos  162<H>             Cultures:       Culture - Blood (collected 17 @ 03:29)  Source: .Blood Blood  only aerobic bottle  Preliminary Report (17 @ 09:01):    No growth to date.    Culture - Blood (collected 17 @ 22:50)  Source: .Blood Blood  Gram Stain (17 @ 06:15):    Growth in aerobic bottle: Gram Positive Cocci in Clusters    Growth in anaerobic bottle: Gram Positive Cocci in Clusters  Preliminary Report (17 @ 06:16):    Growth in aerobic bottle: Gram Positive Cocci in Clusters    ***Blood Panel PCR results on this specimen are available    approximately 3 hours after the Gram stain result.***    Gram stain, PCR, and/or culture results may not always    correspond due to difference in methodologies.    ************************************************************    This PCR assay was performed using [employer].    The following targets are tested for: Enterococcus,    vancomycin resistant enterococci, Listeria monocytogenes,    coagulase negative staphylococci, S. aureus,    methicillin resistant S. aureus, Streptococcus agalactiae    (Group B), S. pneumoniae, S. pyogenes (Group A),    Acinetobacter baumannii, Enterobacter cloacae, E. coli,    Klebsiella oxytoca, K. pneumoniae, Proteus sp.,    Serratia marcescens, Haemophilus influenzae,    Neisseria meningitidis, Pseudomonas aeruginosa, Candida    albicans, C. glabrata, C krusei, C parapsilosis,    C. tropicalis and the KPC resistance gene.    Growth in anaerobic bottle: Gram Positive Cocci in Clusters  Organism: Blood Culture PCR (17 @ 05:30)  Organism: Blood Culture PCR (17 @ 05:30)      -  Coagulase negative Staphylococcus: Detec      Method Type: PCR    Culture - Blood (collected 17 @ 17:12)  Source: .Blood None  Preliminary Report (17 @ 01:02):    No growth to date.    Culture - Blood (collected 17 @ 17:10)  Source: .Blood None  Preliminary Report (17 @ 01:02):    No growth to date.      < from: CT Head No Cont (17 @ 09:07) >    IMPRESSION:       New diffuse hypoattenuation throughout the entire right cerebral   hemisphere, compatible with massive infarct. Significant secondary mass   effect upon the ventricular system with right to left midline shift of   approximately 1.6 cm at the level of the ventricles. Right-sided uncal   herniation. No associated hemorrhage is present. The atrium and temporal   horn of the right ventricle are completely effaced.      < end of copied text >      Radiology:< from: CT Abdomen and Pelvis No Cont (17 @ 18:52) >    EXAM:  CT ABDOMEN AND PELVIS                          EXAM:  CT CHEST                            PROCEDURE DATE:  2017          INTERPRETATION:  CLINICAL INFORMATION: Elevated lipase level and elevated   lactate. Questionable fall.    COMPARISON: None    PROCEDURE:   CT of the Chest, Abdomen and Pelvis was performed without intravenous   contrast.   Intravenous contrast: None.  Oral contrast: None.  Sagittal and coronal reformats were performed.    FINDINGS:    CHEST:     LUNGS AND LARGE AIRWAYS: ET tube in good position.. Multiple right-sided   pulmonary nodules are seen. To right middle lobe nodules measure 7 mm.   Right lower lobe nodule measures 5 mm. 3 mm right lower lobe nodule is   seen. Additional tiny nodules in the right lower lobe are noted. Complete   atelectasis of the left lower lobe. Dependent atelectasis in the right   lower lobe.  Back PLEURA: No pleural effusion.  VESSELS: Within normal limits.  HEART: Heart size is normal.No pericardial effusion.  MEDIASTINUM AND MARI: No lymphadenopathy.  CHEST WALL AND LOWER NECK: Within normal limits.    ABDOMEN AND PELVIS: Study is limited by lack of intravenous contrast.    LIVER: Within normal limits.  BILE DUCTS: Normal caliber.  GALLBLADDER: Cholelithiasis.  SPLEEN: Within normal limits.  PANCREAS: There is diffuse enlargement of the pancreas with several areas   of high density in an around the pancreatic head concerning for   hemorrhage. Extensive peripancreatic inflammation is noted.   ADRENALS: Within normal limits.  KIDNEYS/URETERS: Bilateral renal cysts .    BLADDER: Collapsed around a fully catheter balloon.  REPRODUCTIVE ORGANS: No pelvic masses.    BOWEL: Rectal catheter in place No bowel obstruction. Sigmoid   diverticulosis. Normal appendix. Fluid-filled proximal colon. Thickened   loops of jejunum are likely reactive to the peripancreatic inflammation.  PERITONEUM/RETROPERITONEUM: Mild hyperdensity abdominopelvic ascites.  VESSELS: Atherosclerotic vascular calcifications.    ABDOMINAL WALL: Lipoma in distal left iliopsoas.  BONES: Degenerative changes of the spine.    IMPRESSION:    Redemonstration of right lung nodules measuring up to 7 mm.    Complete left lower lobe atelectasis.    Diffuse pancreatic enlargement with peripancreatic fluid, some of which   is hemorrhagic. Findings concerning for hemorrhagic pancreatitis.   Evaluation is limited by lack of intravenous contrast.    Reactive jejunitis.    < end of copied text >      Advanced directive addressed: DNR
Patient is a 67y Female , remains vented. Noted with large stroke. Making urine    REVIEW OF SYSTEMS:    UTO, intubated.    MEDICATIONS  (STANDING):  aspirin  chewable 81 milliGRAM(s) Oral daily  chlorhexidine 0.12% Liquid 5 milliLiter(s) Swish and Spit two times a day  dextrose 5% + sodium chloride 0.9%. 1000 milliLiter(s) (100 mL/Hr) IV Continuous <Continuous>  dextrose 5%. 1000 milliLiter(s) (50 mL/Hr) IV Continuous <Continuous>  dextrose 50% Injectable 12.5 Gram(s) IV Push once  dextrose 50% Injectable 25 Gram(s) IV Push once  dextrose 50% Injectable 25 Gram(s) IV Push once  influenza   Vaccine 0.5 milliLiter(s) IntraMuscular once  levothyroxine Injectable 90 MICROGram(s) IV Push daily  meropenem IVPB 500 milliGRAM(s) IV Intermittent daily  micafungin IVPB      micafungin IVPB 100 milliGRAM(s) IV Intermittent every 24 hours  norepinephrine Infusion 0.01 MICROgram(s)/kG/Min (1.701 mL/Hr) IV Continuous <Continuous>  pantoprazole  Injectable 40 milliGRAM(s) IV Push daily  vasopressin Infusion 0.04 Unit(s)/Min (2.4 mL/Hr) IV Continuous <Continuous>    MEDICATIONS  (PRN):  acetaminophen    Suspension 650 milliGRAM(s) Oral every 6 hours PRN pain and temp  dextrose Gel 1 Dose(s) Oral once PRN Blood Glucose LESS THAN 70 milliGRAM(s)/deciLiter  glucagon  Injectable 1 milliGRAM(s) IntraMuscular once PRN Glucose <70 milliGRAM(s)/deciLiter  LORazepam   Injectable 1 milliGRAM(s) IV Push every 2 hours PRN Agitation        T(C): , Max: 37.7 (11-18-17 @ 05:00)  T(F): , Max: 99.8 (11-18-17 @ 05:00)  HR: 77 (11-18-17 @ 10:00)  BP: 127/42 (11-18-17 @ 09:30)  BP(mean): 61 (11-18-17 @ 09:30)  RR: 19 (11-18-17 @ 10:00)  SpO2: 100% (11-18-17 @ 06:30)  Wt(kg): --    11-17 @ 07:01 - 11-18 @ 07:00  --------------------------------------------------------  IN: 2748.5 mL / OUT: 2845 mL / NET: -96.5 mL    11-18 @ 07:01 - 11-18 @ 10:13  --------------------------------------------------------  IN: 300 mL / OUT: 0 mL / NET: 300 mL          PHYSICAL EXAM:    Constitutional: intubated  HEENT: intubated  Neck: No LAD, No JVD  Respiratory: dist BS  Cardiovascular: S1 and S2, RRR  Gastrointestinal: soft  Extremities: No peripheral edema  Neurological: intubated/vented  :  Zachery  Skin: No rashes  Access: Not applicable        LABS:                        7.9    34.4  )-----------( 181      ( 18 Nov 2017 05:19 )             24.0     18 Nov 2017 05:19    140    |  104    |  93     ----------------------------<  96     4.4     |  19     |  6.09   17 Nov 2017 19:49    138    |  104    |  93     ----------------------------<  105    4.2     |  20     |  6.02   17 Nov 2017 05:04    136    |  101    |  87     ----------------------------<  107    4.2     |  23     |  6.07   16 Nov 2017 22:31    139    |  103    |  83     ----------------------------<  204    4.1     |  23     |  6.14   16 Nov 2017 05:56    134    |  101    |  76     ----------------------------<  54     5.1     |  19     |  6.03     Ca    6.0        18 Nov 2017 05:19  Ca    5.6        17 Nov 2017 19:49  Ca    5.4        17 Nov 2017 10:32  Ca    5.5        17 Nov 2017 05:04  Ca    5.2        16 Nov 2017 22:31  Phos  7.5       17 Nov 2017 05:04  Phos  5.1       15 Nov 2017 05:08  Mg     1.8       17 Nov 2017 05:04  Mg     1.5       15 Nov 2017 05:08    TPro  4.5    /  Alb  1.0    /  TBili  2.4    /  DBili  x      /  AST  394    /  ALT  239    /  AlkPhos  118    18 Nov 2017 05:19  TPro  4.4    /  Alb  0.9    /  TBili  2.2    /  DBili  x      /  AST  414    /  ALT  289    /  AlkPhos  108    17 Nov 2017 19:49  TPro  4.6    /  Alb  1.0    /  TBili  2.1    /  DBili  x      /  AST  506    /  ALT  403    /  AlkPhos  115    17 Nov 2017 05:04  TPro  4.5    /  Alb  0.9    /  TBili  2.3    /  DBili  x      /  AST  840    /  ALT  743    /  AlkPhos  140    16 Nov 2017 05:56  TPro  4.5    /  Alb  1.1    /  TBili  2.2    /  DBili  x      /  AST  988    /  ALT  910    /  AlkPhos  139    15 Nov 2017 22:16          Urine Studies:          RADIOLOGY & ADDITIONAL STUDIES:
Patient is a 67y Female who remains pressor dependedent (2) and intubated. Minimal UOP, 50cc since change of shift this AM as per RN. ON bicarb and NS infusions, getting IV calcium as well.     REVIEW OF SYSTEMS:    UTO as intuabated/sedated    MEDICATIONS  (STANDING):  aspirin  chewable 81 milliGRAM(s) Oral daily  calcium gluconate IVPB 2 Gram(s) IV Intermittent once  chlorhexidine 0.12% Liquid 5 milliLiter(s) Swish and Spit two times a day  influenza   Vaccine 0.5 milliLiter(s) IntraMuscular once  levothyroxine 175 MICROGram(s) Oral daily  magnesium sulfate  IVPB 1 Gram(s) IV Intermittent once  meropenem IVPB 500 milliGRAM(s) IV Intermittent every 12 hours  norepinephrine Infusion 0.01 MICROgram(s)/kG/Min (1.701 mL/Hr) IV Continuous <Continuous>  pantoprazole  Injectable 40 milliGRAM(s) IV Push daily  propofol Infusion 5 MICROgram(s)/kG/Min (2.721 mL/Hr) IV Continuous <Continuous>  sodium chloride 0.9%. 1000 milliLiter(s) (100 mL/Hr) IV Continuous <Continuous>  vasopressin Infusion 0.04 Unit(s)/Min (2.4 mL/Hr) IV Continuous <Continuous>    MEDICATIONS  (PRN):  acetaminophen    Suspension 650 milliGRAM(s) Oral every 6 hours PRN pain and temp  LORazepam   Injectable 1 milliGRAM(s) IV Push every 2 hours PRN Agitation        T(C): , Max: 38.6 (11-14-17 @ 13:00)  T(F): , Max: 101.4 (11-14-17 @ 13:00)  HR: 87 (11-15-17 @ 10:00)  BP: 126/43 (11-15-17 @ 10:00)  BP(mean): 60 (11-15-17 @ 10:00)  RR: 21 (11-15-17 @ 10:00)  SpO2: 100% (11-15-17 @ 07:58)  Wt(kg): --    11-14 @ 07:01  -  11-15 @ 07:00  --------------------------------------------------------  IN: 8898.1 mL / OUT: 1545 mL / NET: 7353.1 mL    11-15 @ 07:01  -  11-15 @ 10:13  --------------------------------------------------------  IN: 336.4 mL / OUT: 0 mL / NET: 336.4 mL          PHYSICAL EXAM:    Constitutional: NAD, morbidly obese  HEENT: PERRLA, EOMI,  MMM  Neck: No LAD, No JVD  Respiratory: dist BS  Cardiovascular: S1 and S2, RRR  Gastrointestinal: obese  Extremities: tr peripheral edema  Neurological: intubated  :  Zachery  Skin: No rashes        LABS:                        9.4    14.1  )-----------( 225      ( 15 Nov 2017 05:08 )             30.4     15 Nov 2017 05:08    138    |  100    |  72     ----------------------------<  167    4.0     |  24     |  5.49   14 Nov 2017 23:22    138    |  103    |  67     ----------------------------<  153    4.0     |  23     |  5.26   14 Nov 2017 18:11    141    |  105    |  68     ----------------------------<  114    4.0     |  21     |  5.00   14 Nov 2017 05:24    143    |  108    |  64     ----------------------------<  108    4.2     |  18     |  4.61   13 Nov 2017 23:55    142    |  111    |  63     ----------------------------<  102    4.2     |  19     |  4.23     Ca    <3.0       15 Nov 2017 05:08  Ca    5.1        14 Nov 2017 23:22  Ca    <5.0       14 Nov 2017 18:11  Ca    5.2        14 Nov 2017 05:24  Ca    5.6        13 Nov 2017 23:55  Phos  5.1       15 Nov 2017 05:08  Phos  3.5       14 Nov 2017 05:24  Phos  3.5       13 Nov 2017 18:00  Mg     1.5       15 Nov 2017 05:08  Mg     1.4       14 Nov 2017 05:24  Mg     1.4       13 Nov 2017 18:00    TPro  4.0    /  Alb  1.0    /  TBili  2.5    /  DBili  x      /  AST  1252   /  ALT  1096   /  AlkPhos  141    15 Nov 2017 05:08  TPro  4.0    /  Alb  1.1    /  TBili  2.6    /  DBili  x      /  AST  1410   /  ALT  1198   /  AlkPhos  145    14 Nov 2017 23:22  TPro  4.3    /  Alb  1.3    /  TBili  2.5    /  DBili  x      /  AST  1616   /  ALT  1379   /  AlkPhos  156    14 Nov 2017 18:11  TPro  4.2    /  Alb  1.4    /  TBili  2.2    /  DBili  x      /  AST  1679   /  ALT  1393   /  AlkPhos  162    14 Nov 2017 05:24  TPro  4.7    /  Alb  1.6    /  TBili  2.4    /  DBili  x      /  AST  1968   /  ALT  1566   /  AlkPhos  190    13 Nov 2017 23:55          Urine Studies:          RADIOLOGY & ADDITIONAL STUDIES:
Patient is a 67y old  Female who presents with a chief complaint of Altered mentation found on the floor. (2017 20:13)      HPI:  · Chief Complaint: The patient is a 67y Female complaining of unresponsive.	  · Unable to Obtain: Unresponsive	  · Details: Unresponsive	  · HPI Objective Statement: 66 y/o F PMHx COPD, CVA, HTN, Hypothyroid, pulm htn, CHF, on coumadin, asa, presents to the ED s/p unresponsive. The pt was BIBA after being found unresponsive in her bathroom while having a bowel movement. Further history unavailable as pt is unresponsive.	    Patient remains nocturia could've off of sedation. She moves her toes intermittently. With some minimal response to painful stimuli. Otherwise, unresponsive.  MOM left for family    PAST MEDICAL/SURGICAL/FAMILY/SOCIAL HISTORY:    Past Medical History:  Atheroma    COPD (chronic obstructive pulmonary disease)    CVA (cerebral vascular accident)    Diastolic congestive heart failure    HTN (hypertension)    Hypothyroidism    Moderate to severe mitral regurgitation    Obesity    Pulmonary hypertension    Severe tricuspid regurgitation    Systolic heart failure.    ICU Vital Signs Last 24 Hrs  T(C): 37.2 (2017 18:30), Max: 37.9 (2017 16:45)  T(F): 98.9 (2017 18:30), Max: 100.2 (2017 16:45)  HR: 77 (2017 18:30) (77 - 115)  BP: 104/68 (2017 18:30) (66/51 - 186/167)  BP(mean): --  ABP: --  ABP(mean): --  RR: 16 (2017 18:30) (6 - 19)  SpO2: 100% (2017 18:30) (75% - 100%) (2017 20:13)      PAST MEDICAL & SURGICAL HISTORY:  Hypothyroidism  Atheroma  COPD (chronic obstructive pulmonary disease)  Severe tricuspid regurgitation  Moderate to severe mitral regurgitation  Pulmonary hypertension  Obesity  CVA (cerebral vascular accident)  Diastolic congestive heart failure  Systolic heart failure  HTN (hypertension)  History of       MEDICATIONS  (STANDING):  aspirin  chewable 81 milliGRAM(s) Oral daily  calcium gluconate IVPB 2 Gram(s) IV Intermittent every 2 hours  chlorhexidine 0.12% Liquid 5 milliLiter(s) Swish and Spit two times a day  influenza   Vaccine 0.5 milliLiter(s) IntraMuscular once  levothyroxine 175 MICROGram(s) Oral daily  meropenem IVPB 500 milliGRAM(s) IV Intermittent daily  norepinephrine Infusion 0.01 MICROgram(s)/kG/Min (1.701 mL/Hr) IV Continuous <Continuous>  pantoprazole  Injectable 40 milliGRAM(s) IV Push daily  propofol Infusion 5 MICROgram(s)/kG/Min (2.721 mL/Hr) IV Continuous <Continuous>  sodium chloride 0.9%. 1000 milliLiter(s) (100 mL/Hr) IV Continuous <Continuous>  vasopressin Infusion 0.04 Unit(s)/Min (2.4 mL/Hr) IV Continuous <Continuous>    MEDICATIONS  (PRN):  acetaminophen    Suspension 650 milliGRAM(s) Oral every 6 hours PRN pain and temp  LORazepam   Injectable 1 milliGRAM(s) IV Push every 2 hours PRN Agitation      Allergies    No Known Drug Allergies  Originally Entered as [see Comment: &quot;&quot;ALLERGIC TO DOG/CAT/HORSE HAIR;PLANTS: SNEEZING,SCRATCHY THROAT,STUFFY NOSE,ITCHY EYES] reaction to [Animals] (Unknown)    Intolerances        SOCIAL HISTORY:NC    FAMILY HISTORY:  No pertinent family history in first degree relatives      REVIEW OF SYSTEMS:    CONSTITUTIONAL: No weakness, fevers or chills  EYES/ENT: No visual changes;  No vertigo or throat pain   NECK: No pain or stiffness  RESPIRATORY: No cough, wheezing, hemoptysis; No shortness of breath  CARDIOVASCULAR: No chest pain or palpitations  GENITOURINARY: No dysuria, frequency or hematuria  NEUROLOGICAL: No numbness or weakness  SKIN: No itching, burning, rashes, or lesions   All other review of systems is negative unless indicated above.    Vital Signs Last 24 Hrs  T(C): 38 (15 Nov 2017 13:00), Max: 38.5 (15 Nov 2017 01:00)  T(F): 100.4 (15 Nov 2017 13:00), Max: 101.3 (15 Nov 2017 01:00)  HR: 90 (15 Nov 2017 16:25) (80 - 107)  BP: 121/50 (15 Nov 2017 16:00) (80/33 - 141/61)  BP(mean): 68 (15 Nov 2017 16:00) (37 - 99)  RR: 23 (15 Nov 2017 16:00) (19 - 26)  SpO2: 100% (15 Nov 2017 16:25) (100% - 100%)    PHYSICAL EXAM:    Constitutional: well-developed  HEENT: EOMI, throat clear  Neck: No LAD, supple  Respiratory: CTA and P  Cardiovascular: S1 and S2, RRR, no M  Gastrointestinal: BSneg,  soft, NT/ND, neg HSM,  Extremities: No peripheral edema, neg clubing, cyanosis  Vascular: 2+ peripheral pulses  Neurological: A/O x 0, no focal deficits  Psychiatric: Normal mood, normal affect  Skin: No rashes    LABS:  CBC Full  -  ( 15 Nov 2017 05:08 )  WBC Count : 14.1 K/uL  Hemoglobin : 9.4 g/dL  Hematocrit : 30.4 %  Platelet Count - Automated : 225 K/uL  Mean Cell Volume : 86.7 fl  Mean Cell Hemoglobin : 26.9 pg  Mean Cell Hemoglobin Concentration : 31.0 gm/dL  Auto Neutrophil # : x  Auto Lymphocyte # : x  Auto Monocyte # : x  Auto Eosinophil # : x  Auto Basophil # : x  Auto Neutrophil % : x  Auto Lymphocyte % : x  Auto Monocyte % : x  Auto Eosinophil % : x  Auto Basophil % : x    11-15    138  |  100  |  72<H>  ----------------------------<  167<H>  4.0   |  24  |  5.49<H>    Ca    <3.0<!!>      15 Nov 2017 05:08  Phos  5.1     -15  Mg     1.5     11-15    TPro  4.0<L>  /  Alb  1.0<L>  /  TBili  2.5<H>  /  DBili  x   /  AST  1252<H>  /  ALT  1096<H>  /  AlkPhos  141<H>  -15    PT/INR - ( 15 Nov 2017 05:08 )   PT: 25.9 sec;   INR: 2.36 ratio                 RADIOLOGY & ADDITIONAL STUDIES:  < from: Xray Chest 1 View AP/PA. (17 @ 07:33) >  EXAM:  CHEST SINGLE VIEW FRONTAL                            PROCEDURE DATE:  2017          INTERPRETATION:  Exam Date: 2017 7:33 AM    History: Acute respiratory failure    Technique: Single frontal portable view of the chest with comparison to    2017    Findings:    The heart is enlarged. Trace to small left pleural effusion with left   basilar atelectasis.. The apices and hemidiaphragms are unremarkable.   Degenerative changes of the visualized osseous structures.    NG tube into the stomach. Right-sided central line with tip in the SVC   however coiling of the central line in the region of the jugular vein.    Impression:    Trace to small left pleural effusion with left basilar atelectasis              < end of copied text >
Patient is a 67y old  Female who presents with a chief complaint of Altered mentation found on the floor. (2017 20:13)      HPI:  · Chief Complaint: The patient is a 67y Female complaining of unresponsive.	  · Unable to Obtain: Unresponsive	  · Details: Unresponsive	  · HPI Objective Statement: 66 y/o F PMHx COPD, CVA, HTN, Hypothyroid, pulm htn, CHF, on coumadin, asa, presents to the ED s/p unresponsive. The pt was BIBA after being found unresponsive in her bathroom while having a bowel movement. Further history unavailable as pt is unresponsive.	    Patient remains noncommunicative.  Low-grade fevers and leukocytosis.  Patient cannot render any history. She responds to some painful stimuli. Intermittently, she moves her limbs but without any purpose.  Intubated, on pressors.      PAST MEDICAL/SURGICAL/FAMILY/SOCIAL HISTORY:    Past Medical History:  Atheroma    COPD (chronic obstructive pulmonary disease)    CVA (cerebral vascular accident)    Diastolic congestive heart failure    HTN (hypertension)    Hypothyroidism    Moderate to severe mitral regurgitation    Obesity    Pulmonary hypertension    Severe tricuspid regurgitation    Systolic heart failure.    ICU Vital Signs Last 24 Hrs  T(C): 37.2 (2017 18:30), Max: 37.9 (2017 16:45)  T(F): 98.9 (2017 18:30), Max: 100.2 (2017 16:45)  HR: 77 (2017 18:30) (77 - 115)  BP: 104/68 (2017 18:30) (66/51 - 186/167)  BP(mean): --  ABP: --  ABP(mean): --  RR: 16 (2017 18:30) (6 - 19)  SpO2: 100% (2017 18:30) (75% - 100%) (2017 20:13)      PAST MEDICAL & SURGICAL HISTORY:  Hypothyroidism  Atheroma  COPD (chronic obstructive pulmonary disease)  Severe tricuspid regurgitation  Moderate to severe mitral regurgitation  Pulmonary hypertension  Obesity  CVA (cerebral vascular accident)  Diastolic congestive heart failure  Systolic heart failure  HTN (hypertension)  History of       MEDICATIONS  (STANDING):  aspirin  chewable 81 milliGRAM(s) Oral daily  chlorhexidine 0.12% Liquid 5 milliLiter(s) Swish and Spit two times a day  dextrose 5% + sodium chloride 0.9%. 1000 milliLiter(s) (100 mL/Hr) IV Continuous <Continuous>  dextrose 5%. 1000 milliLiter(s) (50 mL/Hr) IV Continuous <Continuous>  dextrose 50% Injectable 12.5 Gram(s) IV Push once  dextrose 50% Injectable 25 Gram(s) IV Push once  dextrose 50% Injectable 25 Gram(s) IV Push once  influenza   Vaccine 0.5 milliLiter(s) IntraMuscular once  levothyroxine Injectable 90 MICROGram(s) IV Push daily  meropenem IVPB 500 milliGRAM(s) IV Intermittent daily  norepinephrine Infusion 0.01 MICROgram(s)/kG/Min (1.701 mL/Hr) IV Continuous <Continuous>  pantoprazole  Injectable 40 milliGRAM(s) IV Push daily  propofol Infusion 5 MICROgram(s)/kG/Min (2.721 mL/Hr) IV Continuous <Continuous>  vasopressin Infusion 0.04 Unit(s)/Min (2.4 mL/Hr) IV Continuous <Continuous>    MEDICATIONS  (PRN):  acetaminophen    Suspension 650 milliGRAM(s) Oral every 6 hours PRN pain and temp  dextrose Gel 1 Dose(s) Oral once PRN Blood Glucose LESS THAN 70 milliGRAM(s)/deciLiter  glucagon  Injectable 1 milliGRAM(s) IntraMuscular once PRN Glucose <70 milliGRAM(s)/deciLiter  LORazepam   Injectable 1 milliGRAM(s) IV Push every 2 hours PRN Agitation      Allergies    No Known Drug Allergies  Originally Entered as [see Comment: &quot;&quot;ALLERGIC TO DOG/CAT/HORSE HAIR;PLANTS: SNEEZING,SCRATCHY THROAT,STUFFY NOSE,ITCHY EYES] reaction to [Animals] (Unknown)    Intolerances        SOCIAL HISTORY:NC    FAMILY HISTORY:  No pertinent family history in first degree relatives      REVIEW OF SYSTEMS:    can not obtain    Vital Signs Last 24 Hrs  T(C): 37.2 (2017 06:00), Max: 37.9 (2017 22:00)  T(F): 98.9 (2017 06:00), Max: 100.3 (2017 22:00)  HR: 83 (2017 08:00) (71 - 90)  BP: 120/48 (2017 08:00) (95/43 - 162/57)  BP(mean): 72 (2017 08:00) (51 - 81)  RR: 22 (2017 08:00) (13 - 23)  SpO2: 100% (2017 08:00) (99% - 100%)    PHYSICAL EXAM:    Constitutional: NAD, well-developed  HEENT: EOMI, throat clear  Neck: No LAD, supple  Respiratory: dec BS at bases  Cardiovascular: S1 and S2, RRR, no M  Gastrointestinal: BS neg, soft, distended, neg HSM,  Extremities: No peripheral edema, neg clubing, cyanosis  Vascular: 2+ peripheral pulses  Neurological: A/O x 0, no focal deficits    Skin: No rashes    LABS:  CBC Full  -  ( 2017 05:04 )  WBC Count : 24.3 K/uL  Hemoglobin : 8.6 g/dL  Hematocrit : 25.5 %  Platelet Count - Automated : 146 K/uL  Mean Cell Volume : 86.8 fl  Mean Cell Hemoglobin : 29.1 pg  Mean Cell Hemoglobin Concentration : 33.5 gm/dL  Auto Neutrophil # : x  Auto Lymphocyte # : x  Auto Monocyte # : x  Auto Eosinophil # : x  Auto Basophil # : x  Auto Neutrophil % : x  Auto Lymphocyte % : x  Auto Monocyte % : x  Auto Eosinophil % : x  Auto Basophil % : x        136  |  101  |  87<H>  ----------------------------<  107<H>  4.2   |  23  |  6.07<H>    Ca    5.5<LL>      2017 05:04  Phos  7.5       Mg     1.8         TPro  4.6<L>  /  Alb  1.0<L>  /  TBili  2.1<H>  /  DBili  x   /  AST  506<H>  /  ALT  403<H>  /  AlkPhos  115      PT/INR - ( 2017 05:04 )   PT: 30.0 sec;   INR: 2.72 ratio                 RADIOLOGY & ADDITIONAL STUDIES:
Patient is a 67y old  Female who presents with a chief complaint of Altered mentation found on the floor. (2017 20:13)      HPI:  · Chief Complaint: The patient is a 67y Female complaining of unresponsive.	  · Unable to Obtain: Unresponsive	  · Details: Unresponsive	  · HPI Objective Statement: 68 y/o F PMHx COPD, CVA, HTN, Hypothyroid, pulm htn, CHF, on coumadin, asa, presents to the ED s/p unresponsive. The pt was BIBA after being found unresponsive in her bathroom while having a bowel movement. Further history unavailable as pt is unresponsive.	    Patient is sedated on vent and pressors. Unable to give further history.     PAST MEDICAL/SURGICAL/FAMILY/SOCIAL HISTORY:    Past Medical History:  Atheroma    COPD (chronic obstructive pulmonary disease)    CVA (cerebral vascular accident)    Diastolic congestive heart failure    HTN (hypertension)    Hypothyroidism    Moderate to severe mitral regurgitation    Obesity    Pulmonary hypertension    Severe tricuspid regurgitation    Systolic heart failure.    ICU Vital Signs Last 24 Hrs  T(C): 37.2 (2017 18:30), Max: 37.9 (2017 16:45)  T(F): 98.9 (2017 18:30), Max: 100.2 (2017 16:45)  HR: 77 (2017 18:30) (77 - 115)  BP: 104/68 (2017 18:30) (66/51 - 186/167)  BP(mean): --  ABP: --  ABP(mean): --  RR: 16 (2017 18:30) (6 - 19)  SpO2: 100% (2017 18:30) (75% - 100%) (2017 20:13)      PAST MEDICAL & SURGICAL HISTORY:  Hypothyroidism  Atheroma  COPD (chronic obstructive pulmonary disease)  Severe tricuspid regurgitation  Moderate to severe mitral regurgitation  Pulmonary hypertension  Obesity  CVA (cerebral vascular accident)  Diastolic congestive heart failure  Systolic heart failure  HTN (hypertension)  History of       MEDICATIONS  (STANDING):  aspirin  chewable 81 milliGRAM(s) Oral daily  chlorhexidine 0.12% Liquid 5 milliLiter(s) Swish and Spit two times a day  dextrose 5% + sodium chloride 0.9%. 1000 milliLiter(s) (100 mL/Hr) IV Continuous <Continuous>  dextrose 5%. 1000 milliLiter(s) (50 mL/Hr) IV Continuous <Continuous>  dextrose 50% Injectable 12.5 Gram(s) IV Push once  dextrose 50% Injectable 25 Gram(s) IV Push once  dextrose 50% Injectable 25 Gram(s) IV Push once  influenza   Vaccine 0.5 milliLiter(s) IntraMuscular once  levothyroxine Injectable 90 MICROGram(s) IV Push daily  meropenem IVPB 500 milliGRAM(s) IV Intermittent daily  micafungin IVPB      micafungin IVPB 100 milliGRAM(s) IV Intermittent every 24 hours  norepinephrine Infusion 0.01 MICROgram(s)/kG/Min (1.701 mL/Hr) IV Continuous <Continuous>  pantoprazole  Injectable 40 milliGRAM(s) IV Push daily  vasopressin Infusion 0.04 Unit(s)/Min (2.4 mL/Hr) IV Continuous <Continuous>    MEDICATIONS  (PRN):  acetaminophen    Suspension 650 milliGRAM(s) Oral every 6 hours PRN pain and temp  dextrose Gel 1 Dose(s) Oral once PRN Blood Glucose LESS THAN 70 milliGRAM(s)/deciLiter  glucagon  Injectable 1 milliGRAM(s) IntraMuscular once PRN Glucose <70 milliGRAM(s)/deciLiter  LORazepam   Injectable 1 milliGRAM(s) IV Push every 2 hours PRN Agitation      Allergies    No Known Drug Allergies  Originally Entered as [see Comment: &quot;&quot;ALLERGIC TO DOG/CAT/HORSE HAIR;PLANTS: SNEEZING,SCRATCHY THROAT,STUFFY NOSE,ITCHY EYES] reaction to [Animals] (Unknown)    Intolerances        REVIEW OF SYSTEMS:    CONSTITUTIONAL: No weakness, fevers or chills  RESPIRATORY: No cough, wheezing, hemoptysis; No shortness of breath  CARDIOVASCULAR: No chest pain or palpitations  GASTROINTESTINAL: No abdominal or epigastric pain. No nausea, vomiting, or hematemesis; No diarrhea or constipation. No melena or hematochezia.  All other review of systems is negative unless indicated above.    Vital Signs Last 24 Hrs  T(C): 37.7 (2017 05:00), Max: 37.7 (2017 05:00)  T(F): 99.8 (2017 05:00), Max: 99.8 (2017 05:00)  HR: 83 (2017 06:30) (74 - 89)  BP: 115/60 (2017 06:30) (97/45 - 157/45)  BP(mean): 66 (2017 06:30) (48 - 81)  RR: 12 (2017 06:30) (0 - 23)  SpO2: 100% (2017 06:30) (100% - 100%)    PHYSICAL EXAM:    Constitutional: sedated, ventilated  Respiratory: CTAB  Cardiovascular: S1 and S2, RRR, no M/G/R  Gastrointestinal: softly distended, NT  LABS:                        7.9    34.4  )-----------( 181      ( 2017 05:19 )             24.0         138  |  104  |  93<H>  ----------------------------<  105<H>  4.2   |  20<L>  |  6.02<H>    Ca    5.6<LL>      2017 19:49  Phos  7.5       Mg     1.8         TPro  4.4<L>  /  Alb  0.9<L>  /  TBili  2.2<H>  /  DBili  x   /  AST  414<H>  /  ALT  289<H>  /  AlkPhos  108  -17    PT/INR - ( 2017 05:19 )   PT: 62.7 sec;   INR: 5.60 ratio           LIVER FUNCTIONS - ( 2017 19:49 )  Alb: 0.9 g/dL / Pro: 4.4 gm/dL / ALK PHOS: 108 U/L / ALT: 289 U/L / AST: 414 U/L / GGT: x             RADIOLOGY & ADDITIONAL STUDIES:
Patient is a 67y old  Female who presents with a chief complaint of Altered mentation found on the floor. (2017 20:13)      HPI:  · Chief Complaint: The patient is a 67y Female complaining of unresponsive.	  · Unable to Obtain: Unresponsive	  · Details: Unresponsive	  · HPI Objective Statement: 68 y/o F PMHx COPD, CVA, HTN, Hypothyroid, pulm htn, CHF, on coumadin, asa, presents to the ED s/p unresponsive. The pt was BIBA after being found unresponsive in her bathroom while having a bowel movement. Further history unavailable as pt is unresponsive.	    Patient remain is not indicated. History is per patient's daughter, nurse and discussion with intensivist.  Remains intubated on pressors. He was not dialyzed yesterday.  Daughters denies any significant alcohol use for mother. Denies any history of pancreatitis.        PAST MEDICAL/SURGICAL/FAMILY/SOCIAL HISTORY:    Past Medical History:  Atheroma    COPD (chronic obstructive pulmonary disease)    CVA (cerebral vascular accident)    Diastolic congestive heart failure    HTN (hypertension)    Hypothyroidism    Moderate to severe mitral regurgitation    Obesity    Pulmonary hypertension    Severe tricuspid regurgitation    Systolic heart failure.    ICU Vital Signs Last 24 Hrs  T(C): 37.2 (2017 18:30), Max: 37.9 (2017 16:45)  T(F): 98.9 (2017 18:30), Max: 100.2 (2017 16:45)  HR: 77 (2017 18:30) (77 - 115)  BP: 104/68 (2017 18:30) (66/51 - 186/167)  BP(mean): --  ABP: --  ABP(mean): --  RR: 16 (2017 18:30) (6 - 19)  SpO2: 100% (2017 18:30) (75% - 100%) (2017 20:13)      PAST MEDICAL & SURGICAL HISTORY:  Hypothyroidism  Atheroma  COPD (chronic obstructive pulmonary disease)  Severe tricuspid regurgitation  Moderate to severe mitral regurgitation  Pulmonary hypertension  Obesity  CVA (cerebral vascular accident)  Diastolic congestive heart failure  Systolic heart failure  HTN (hypertension)  History of       MEDICATIONS  (STANDING):  aspirin  chewable 81 milliGRAM(s) Oral daily  calcium gluconate IVPB 2 Gram(s) IV Intermittent once  chlorhexidine 0.12% Liquid 5 milliLiter(s) Swish and Spit two times a day  dextrose 5%. 1000 milliLiter(s) (50 mL/Hr) IV Continuous <Continuous>  dextrose 50% Injectable 12.5 Gram(s) IV Push once  dextrose 50% Injectable 25 Gram(s) IV Push once  dextrose 50% Injectable 25 Gram(s) IV Push once  influenza   Vaccine 0.5 milliLiter(s) IntraMuscular once  levothyroxine Injectable 90 MICROGram(s) IV Push daily  meropenem IVPB 500 milliGRAM(s) IV Intermittent daily  norepinephrine Infusion 0.01 MICROgram(s)/kG/Min (1.701 mL/Hr) IV Continuous <Continuous>  pantoprazole  Injectable 40 milliGRAM(s) IV Push daily  propofol Infusion 5 MICROgram(s)/kG/Min (2.721 mL/Hr) IV Continuous <Continuous>  sodium chloride 0.9%. 1000 milliLiter(s) (100 mL/Hr) IV Continuous <Continuous>  vasopressin Infusion 0.04 Unit(s)/Min (2.4 mL/Hr) IV Continuous <Continuous>    MEDICATIONS  (PRN):  acetaminophen    Suspension 650 milliGRAM(s) Oral every 6 hours PRN pain and temp  dextrose Gel 1 Dose(s) Oral once PRN Blood Glucose LESS THAN 70 milliGRAM(s)/deciLiter  glucagon  Injectable 1 milliGRAM(s) IntraMuscular once PRN Glucose <70 milliGRAM(s)/deciLiter  LORazepam   Injectable 1 milliGRAM(s) IV Push every 2 hours PRN Agitation      Allergies    No Known Drug Allergies  Originally Entered as [see Comment: &quot;&quot;ALLERGIC TO DOG/CAT/HORSE HAIR;PLANTS: SNEEZING,SCRATCHY THROAT,STUFFY NOSE,ITCHY EYES] reaction to [Animals] (Unknown)    Intolerances        SOCIAL HISTORY:NC    FAMILY HISTORY:  No pertinent family history in first degree relatives      REVIEW OF SYSTEMS:  ca not obtain    Vital Signs Last 24 Hrs  T(C): 37.8 (2017 08:30), Max: 38.3 (15 Nov 2017 17:00)  T(F): 100.1 (2017 08:30), Max: 101 (15 Nov 2017 17:00)  HR: 87 (2017 09:00) (77 - 99)  BP: 97/51 (2017 09:00) (81/38 - 145/66)  BP(mean): 61 (2017 09:00) (49 - 99)  RR: 18 (2017 09:00) (17 - 24)  SpO2: 100% (2017 09:00) (100% - 100%)    PHYSICAL EXAM:    Constitutional: , well-developed  HEENT:  throat clear, intubated  Neck: No LAD, supple  Respiratory: CTA and P  Cardiovascular: S1 and S2, RRR, no M  Gastrointestinal: BS neg, soft, distended, neg HSM,  Extremities: No peripheral edema, neg clubing, cyanosis  Vascular: 2+ peripheral pulses  Neurological: A/O x 0, no focal deficits  Psychiatric: Normal mood, normal affect  Skin: No rashes    LABS:  CBC Full  -  ( 2017 05:56 )  WBC Count : 23.1 K/uL  Hemoglobin : 9.3 g/dL  Hematocrit : 29.0 %  Platelet Count - Automated : 189 K/uL  Mean Cell Volume : 86.9 fl  Mean Cell Hemoglobin : 28.0 pg  Mean Cell Hemoglobin Concentration : 32.2 gm/dL  Auto Neutrophil # : x  Auto Lymphocyte # : x  Auto Monocyte # : x  Auto Eosinophil # : x  Auto Basophil # : x  Auto Neutrophil % : x  Auto Lymphocyte % : x  Auto Monocyte % : x  Auto Eosinophil % : x  Auto Basophil % : x    11-16    134<L>  |  101  |  76<H>  ----------------------------<  54<L>  5.1   |  19<L>  |  6.03<H>    Ca    5.5<LL>      2017 05:56  Phos  5.1     11-15  Mg     1.5     11-15    TPro  4.5<L>  /  Alb  0.9<L>  /  TBili  2.3<H>  /  DBili  x   /  AST  840<H>  /  ALT  743<H>  /  AlkPhos  140<H>  11-16    PT/INR - ( 2017 05:56 )   PT: 57.3 sec;   INR: 5.13 ratio                 RADIOLOGY & ADDITIONAL STUDIES:  < from: MR Abdomen No Cont (11.15.17 @ 22:05) >  EXAM:  MR ABDOMEN                            PROCEDURE DATE:  11/15/2017          INTERPRETATION:  MR ABDOMEN    HISTORY:  Acute Pancreatitis  assess for panc stones    TECHNIQUE: Multiplanar T1-weighted, T2-weighted sequences were obtained   of the abdomen, without post-contrast T1-weighted sequences.  3D heavily   T2-weighted thin-section MRCP was also performed.    Field Strength: 1.5T    Contrast: None    .    COMPARISON: CT chest/abdomen/pelvis 2017.    FINDINGS: Moderate-severe motion related artifacts degrading image   quality on multiple sequences.    LIVER: Normal.  SPLEEN: Normal.    PANCREAS: Diffuse pancreatic enlargement and edema with diffuse low T1   signal. No main duct dilatation or intraductal calculi are visualized.  There is extensive peripancreatic edema with multiple acute   peripancreatic fluid collections throughout the retroperitoneum and   lesser sac, some of which contained hemorrhagic fluid content without   significant change compared to prior CT. No organized fluid collection.    GALLBLADDER/BILIARY TREE: Nondilated. Normal distal tapering. No filling   defect. No intrahepatic dilatation. Gallbladder is nondistended and   contains multiple small stones as well as a 2.4 cm calculus.    ADRENALS: Normal.  KIDNEYS: No hydronephrosis.  LYMPHADENOPATHY/RETROPERITONEUM: No adenopathy.  VASCULATURE: Normal caliber aorta.  BOWEL: No dilated bowel loops.  PERITONEUM/ABDOMINAL WALL: Stable moderate volume ascites.  SKELETAL: No aggressive lesion.  LUNGBASES: Small bilateral pleural effusions and bibasilar atelectasis.    IMPRESSION:    Moderate-severe motion related artifacts degrading image quality on   multiple sequences.    Severe hemorrhagic pancreatitis with multiple acute peripancreatic fluid   collections not significantly changed from prior CT. The degree of   pancreatic necrosis cannot be assessed without intravenous contrast. No   main duct dilatation or intraductal calculi.    Multiple gallstones without choledocholithiasis or biliary dilatation.                < end of copied text >
Patient is a 67y old  Female who presents with a chief complaint of Altered mentation found on the floor. (2017 20:13)      HPI:  · Chief Complaint: The patient is a 67y Female complaining of unresponsive.	  · Unable to Obtain: Unresponsive	  · Details: Unresponsive	  · HPI Objective Statement: 68 y/o F PMHx COPD, CVA, HTN, Hypothyroid, pulm htn, CHF, on coumadin, asa, presents to the ED s/p unresponsive. The pt was BIBA after being found unresponsive in her bathroom while having a bowel movement. Further history unavailable as pt is unresponsive.	    till anuric despite IVF and NaHCO3 infusion - requiring less pressors but still remains critically ill on vent support - remains encephalopathic.    Patient is not need to. She is intubated and ventilated. Off of sedation, she remains noncommunicative.  Abdomen is distended. Bowel sounds are negative.  She has been producing small amount of stool.  She has a fecal bag in place.        PAST MEDICAL/SURGICAL/FAMILY/SOCIAL HISTORY:    Past Medical History:  Atheroma    COPD (chronic obstructive pulmonary disease)    CVA (cerebral vascular accident)    Diastolic congestive heart failure    HTN (hypertension)    Hypothyroidism    Moderate to severe mitral regurgitation    Obesity    Pulmonary hypertension    Severe tricuspid regurgitation    Systolic heart failure.    ICU Vital Signs Last 24 Hrs  T(C): 37.2 (2017 18:30), Max: 37.9 (2017 16:45)  T(F): 98.9 (2017 18:30), Max: 100.2 (2017 16:45)  HR: 77 (2017 18:30) (77 - 115)  BP: 104/68 (2017 18:30) (66/51 - 186/167)  BP(mean): --  ABP: --  ABP(mean): --  RR: 16 (2017 18:30) (6 - 19)  SpO2: 100% (2017 18:30) (75% - 100%) (2017 20:13)      PAST MEDICAL & SURGICAL HISTORY:  Hypothyroidism  Atheroma  COPD (chronic obstructive pulmonary disease)  Severe tricuspid regurgitation  Moderate to severe mitral regurgitation  Pulmonary hypertension  Obesity  CVA (cerebral vascular accident)  Diastolic congestive heart failure  Systolic heart failure  HTN (hypertension)  History of       MEDICATIONS  (STANDING):  aspirin  chewable 81 milliGRAM(s) Oral daily  chlorhexidine 0.12% Liquid 5 milliLiter(s) Swish and Spit two times a day  dextrose 5% 1000 milliLiter(s) (200 mL/Hr) IV Continuous <Continuous>  influenza   Vaccine 0.5 milliLiter(s) IntraMuscular once  levothyroxine 175 MICROGram(s) Oral daily  meropenem IVPB 500 milliGRAM(s) IV Intermittent every 12 hours  norepinephrine Infusion 0.01 MICROgram(s)/kG/Min (1.701 mL/Hr) IV Continuous <Continuous>  pantoprazole  Injectable 40 milliGRAM(s) IV Push daily  propofol Infusion 5 MICROgram(s)/kG/Min (2.721 mL/Hr) IV Continuous <Continuous>  sodium chloride 0.9%. 1000 milliLiter(s) (100 mL/Hr) IV Continuous <Continuous>  vasopressin Infusion 0.04 Unit(s)/Min (2.4 mL/Hr) IV Continuous <Continuous>    MEDICATIONS  (PRN):  acetaminophen    Suspension 650 milliGRAM(s) Oral every 6 hours PRN pain and temp  LORazepam   Injectable 1 milliGRAM(s) IV Push every 2 hours PRN Agitation      Allergies    No Known Drug Allergies  Originally Entered as [see Comment: &quot;&quot;ALLERGIC TO DOG/CAT/HORSE HAIR;PLANTS: SNEEZING,SCRATCHY THROAT,STUFFY NOSE,ITCHY EYES] reaction to [Animals] (Unknown)    Intolerances        SOCIAL HISTORY:Non contributory    FAMILY HISTORY:  No pertinent family history in first degree relatives      REVIEW OF SYSTEMS:    ca not obtain    Vital Signs Last 24 Hrs  T(C): 38.6 (2017 17:00), Max: 38.9 (2017 06:00)  T(F): 101.4 (2017 17:00), Max: 102 (2017 06:00)  HR: 112 (2017 17:00) (99 - 121)  BP: 127/47 (2017 17:00) (74/38 - 137/54)  BP(mean): 64 (2017 17:00) (41 - 99)  RR: 25 (2017 17:00) (12 - 31)  SpO2: 99% (2017 16:56) (99% - 100%)    PHYSICAL EXAM:    Constitutional: well-developed  HEENT: EOMI, throat clear  Neck: No LAD, supple  Respiratory: CTA and P  Cardiovascular: S1 and S2, RRR, no M  Gastrointestinal: BSneg, soft, distended, cant assess tenderness  Extremities: No peripheral edema, neg clubing, cyanosis  Vascular: 2+ peripheral pulses  Neurological: A/O x 3, no focal deficits  Psychiatric: Normal mood, normal affect  Skin: No rashes    LABS:  CBC Full  -  ( 2017 18:11 )  WBC Count : 17.5 K/uL  Hemoglobin : 10.3 g/dL  Hematocrit : 31.1 %  Platelet Count - Automated : 308 K/uL  Mean Cell Volume : 86.5 fl  Mean Cell Hemoglobin : 28.7 pg  Mean Cell Hemoglobin Concentration : 33.2 gm/dL  Auto Neutrophil # : x  Auto Lymphocyte # : x  Auto Monocyte # : x  Auto Eosinophil # : x  Auto Basophil # : x  Auto Neutrophil % : x  Auto Lymphocyte % : x  Auto Monocyte % : x  Auto Eosinophil % : x  Auto Basophil % : x    11-14    143  |  108  |  64<H>  ----------------------------<  108<H>  4.2   |  18<L>  |  4.61<H>    Ca    5.2<LL>      2017 05:24  Phos  3.5     11-14  Mg     1.4     -14    TPro  4.2<L>  /  Alb  1.4<L>  /  TBili  2.2<H>  /  DBili  x   /  AST  1679<H>  /  ALT  1393<H>  /  AlkPhos  162<H>  -    PT/INR - ( 2017 18:11 )   PT: 20.5 sec;   INR: 1.87 ratio                 RADIOLOGY & ADDITIONAL STUDIES:  < from: CT Abdomen and Pelvis No Cont (17 @ 18:52) >  EXAM:  CT ABDOMEN AND PELVIS                          EXAM:  CT CHEST                            PROCEDURE DATE:  2017          INTERPRETATION:  CLINICAL INFORMATION: Elevated lipase level and elevated   lactate. Questionable fall.    COMPARISON: None    PROCEDURE:   CT of the Chest, Abdomen and Pelvis was performed without intravenous   contrast.   Intravenous contrast: None.  Oral contrast: None.  Sagittal and coronal reformats were performed.    FINDINGS:    CHEST:     LUNGS AND LARGE AIRWAYS: ET tube in good position.. Multiple right-sided   pulmonary nodules are seen. To right middle lobe nodules measure 7 mm.   Right lower lobe nodule measures 5 mm. 3 mm right lower lobe nodule is   seen. Additional tiny nodules in the right lower lobe are noted. Complete   atelectasis of the left lower lobe. Dependent atelectasis in the right   lower lobe.  Back PLEURA: No pleural effusion.  VESSELS: Within normal limits.  HEART: Heart size is normal.No pericardial effusion.  MEDIASTINUM AND MARI: No lymphadenopathy.  CHEST WALL AND LOWER NECK: Within normal limits.    ABDOMEN AND PELVIS: Study is limited by lack of intravenous contrast.    LIVER: Within normal limits.  BILE DUCTS: Normal caliber.  GALLBLADDER: Cholelithiasis.  SPLEEN: Within normal limits.  PANCREAS: There is diffuse enlargement of the pancreas with several areas   of high density in an around the pancreatic head concerning for   hemorrhage. Extensive peripancreatic inflammation is noted.   ADRENALS: Within normal limits.  KIDNEYS/URETERS: Bilateral renal cysts .    BLADDER: Collapsed around a fully catheter balloon.  REPRODUCTIVE ORGANS: No pelvic masses.    BOWEL: Rectal catheter in place No bowel obstruction. Sigmoid   diverticulosis. Normal appendix. Fluid-filled proximal colon. Thickened   loops of jejunum are likely reactive to the peripancreatic inflammation.  PERITONEUM/RETROPERITONEUM: Mild hyperdensity abdominopelvic ascites.  VESSELS: Atherosclerotic vascular calcifications.    ABDOMINAL WALL: Lipoma in distal left iliopsoas.  BONES: Degenerative changes of the spine.    IMPRESSION:    Redemonstration of right lung nodules measuring up to 7 mm.    Complete left lower lobe atelectasis.    Diffuse pancreatic enlargement with peripancreatic fluid, some of which   is hemorrhagic. Findings concerning for hemorrhagic pancreatitis.   Evaluation is limited by lack of intravenous contrast.    Reactive jejunitis.    Findings discussed with Dr. Anthony by Dr. Mcbride on 2017 at 1:30   AM with read back.          < end of copied text >
pts family assembled at bedside    Fili Ch with her father (pts ex ), pts sister, and pts granddaughter    Notably daughter explained that pt had been diagnosed with 99% carotid stenosis in the last few months.    we discussed current issues including massive holohemispheric RIGHT CVA, persistence of severe hemorrhagic pancreatitis, acute respiratory failure, shock, and acute renal failure    At this time, the most devastating issue is the CVA as pt will either herniate due to the edema, or survive, but be completely neurologically debilitated.    I reviewed the CT images with the family so they could get an understanding of the magnitude of the injury.    Given current prognosis the pts daughter (and other family) do not feel that the pt would want to continue aggressive tx and they want to transition to CMO    We discussed several options that all include palliative analgesia including: observation understanding that herniation might occur, stopping pressors, and finally liberation from the ventilator.    After discussing with the family I asked Father Ramón to come and pray with family.    A) catastrophic RIGHT cerebral infarction/massive RIGHT CVA.      P) given pts known wishes, family will likely proceed with compassionate liberation and CMO.     Dilaudid infusion ordered with PRN doses for breakthrough symptoms     Spiritual support     Emotional support    will proceed when family ready to do so.    Critical care time additional 30 min excluding teaching, routine family updates, procedures.
· Chief Complaint: The patient is a 67y Female complaining of unresponsive.	  · Unable to Obtain: Unresponsive	  · Details: Unresponsive	  · HPI Objective Statement: 68 y/o F PMHx COPD, CVA, HTN, Hypothyroid, pulm htn, CHF, on coumadin, asa, presents to the ED s/p unresponsive. The pt was BIBA after being found unresponsive in her bathroom while having a bowel movement. Further history unavailable as pt is unresponsive.	    above d.w Dr Anthony who initially evaluated and treated the patient.    Admitted with diagnosis of severe pancreatitis - intubated, on pressor for support of MAP.    pt seen initially at 8am and then hourly over the course of the day - d/w charge and bedside RN's, D/w Dr Bailey and Dr Marin.    Empiric abx changed to meropenem    Pt anuric despite aggressive hydration and NaHCO3 infusion - remains on levophed infusion and vent support.    Coagulopathy due to coumadin also noted a treated with vitamin K 2.5mg PO x 1 - no evidence of bleeding at this time.    Pt off sedation but severely encephalopathic.    Pt daughter at bedside at 18:30 case discussed in detail including poor prognosis, current issues and plan for further care.    : still anuric despite IVF and NaHCO3 infusion - requiring less pressors but still remains critically ill on vent support - remains encephalopathic.    Case again discussed in detail with pts daughter and with pts sister at bedside - all questions answered and severity of illness plainly explained - also discussed likely need fo HD initiation on 11/15 if no significant improvement on urine output.    notably pt with coag neg staph in 1 blood culture from admission.          PAST MEDICAL & SURGICAL HISTORY:  Hypothyroidism  Atheroma  COPD (chronic obstructive pulmonary disease)  Severe tricuspid regurgitation  Moderate to severe mitral regurgitation  Pulmonary hypertension  Obesity  CVA (cerebral vascular accident)  Diastolic congestive heart failure  Systolic heart failure  HTN (hypertension)  History of       Allergies    No Known Drug Allergies  Originally Entered as [see Comment: &quot;&quot;ALLERGIC TO DOG/CAT/HORSE HAIR;PLANTS: SNEEZING,SCRATCHY THROAT,STUFFY NOSE,ITCHY EYES] reaction to [Animals] (Unknown)      ICU Vital Signs Last 24 Hrs  T(C): 38.6 (2017 13:00), Max: 38.9 (2017 06:00)  T(F): 101.4 (2017 13:00), Max: 102 (2017 06:00)  HR: 108 (2017 14:35) (96 - 121)  BP: 101/78 (2017 14:00) (90/38 - 137/54)  BP(mean): 84 (2017 14:00) (52 - 99)  ABP: --  ABP(mean): --  RR: 26 (2017 14:00) (19 - 31)  SpO2: 100% (2017 14:35) (99% - 100%)      Mode: AC/ CMV (Assist Control/ Continuous Mandatory Ventilation)  RR (machine): 12  TV (machine): 500  FiO2: 40  PEEP: 5  ITime: 1  PIP: 29      I&O's Summary    2017 07:  -  2017 07:00  --------------------------------------------------------  IN: 8916.5 mL / OUT: 10 mL / NET: 8906.5 mL    2017 07:01  -  2017 15:36  --------------------------------------------------------  IN: 2572 mL / OUT: 0 mL / NET: 2572 mL                              10.4   15.3  )-----------( 303      ( 2017 05:24 )             33.0           143  |  108  |  64<H>  ----------------------------<  108<H>  4.2   |  18<L>  |  4.61<H>    Ca    5.2<LL>      2017 05:24  Phos  3.5     14  Mg     1.4         TPro  4.2<L>  /  Alb  1.4<L>  /  TBili  2.2<H>  /  DBili  x   /  AST  1679<H>  /  ALT  1393<H>  /  AlkPhos  162<H>  14      CAPILLARY BLOOD GLUCOSE          LIVER FUNCTIONS - ( 2017 05:24 )  Alb: 1.4 g/dL / Pro: 4.2 gm/dL / ALK PHOS: 162 U/L / ALT: 1393 U/L / AST: 1679 U/L / GGT: x             CARDIAC MARKERS ( 2017 22:50 )  0.250 ng/mL / x     / x     / x     / x      CARDIAC MARKERS ( 2017 19:45 )  0.161 ng/mL / x     / x     / x     / x      CARDIAC MARKERS ( 2017 17:33 )  0.088 ng/mL / x     / 231 U/L / x     / x      CARDIAC MARKERS ( 2017 16:41 )  x     / x     / 335 U/L / x     / x            PT/INR - ( 2017 05:24 )   PT: 23.7 sec;   INR: 2.16 ratio         PTT - ( 2017 17:09 )  PTT:38.6 sec    ABG - ( 2017 04:30 )  pH: 7.33  /  pCO2: 31    /  pO2: 96    / HCO3: 16    / Base Excess: -9    /  SaO2: 96                  Urinalysis Basic - ( 2017 17:05 )    Color: Deann / Appearance: very cloudy / S.025 / pH: x  Gluc: x / Ketone: Trace  / Bili: Moderate / Urobili: 4 mg/dL   Blood: x / Protein: 100 mg/dL / Nitrite: Positive   Leuk Esterase: Trace / RBC: 6-10 /HPF / WBC 0-2   Sq Epi: x / Non Sq Epi: Occasional / Bacteria: TNTC        MEDICATIONS  (STANDING):  aspirin  chewable 81 milliGRAM(s) Oral daily  chlorhexidine 0.12% Liquid 5 milliLiter(s) Swish and Spit two times a day  dextrose 5% 1000 milliLiter(s) (200 mL/Hr) IV Continuous <Continuous>  influenza   Vaccine 0.5 milliLiter(s) IntraMuscular once  levothyroxine 175 MICROGram(s) Oral daily  meropenem IVPB 500 milliGRAM(s) IV Intermittent every 12 hours  norepinephrine Infusion 0.01 MICROgram(s)/kG/Min (1.701 mL/Hr) IV Continuous <Continuous>  pantoprazole  Injectable 40 milliGRAM(s) IV Push daily  propofol Infusion 5 MICROgram(s)/kG/Min (2.721 mL/Hr) IV Continuous <Continuous>  sodium chloride 0.9%. 1000 milliLiter(s) (100 mL/Hr) IV Continuous <Continuous>    MEDICATIONS  (PRN):  acetaminophen    Suspension 650 milliGRAM(s) Oral every 6 hours PRN pain and temp  LORazepam   Injectable 1 milliGRAM(s) IV Push every 2 hours PRN Agitation      Review of Systems:   · Additional ROS	Encephalopathic	    Physical Exam:   · Constitutional	detailed exam	  · Constitutional Details	obese, no distress	  · Eyes	detailed exam	  · Eyes Details	PERRL	  · Neck	detailed exam	  · Neck Details	supple; no JVD	  · Back	detailed exam	  · Back Details	normal shape	  · Respiratory	detailed exam	  · Respiratory Details	airway patent; breath sounds equal; good air movement	  · Respiratory Comments	orally intubated	  · Cardiovascular	detailed exam	  · Cardiovascular Details	irregular rate and rhythm	  · Gastrointestinal	detailed exam	  · GI Normal	soft	  · GI Abnormal	distended  bowel sounds hypoactive	  · Genitourinary	detailed exam	  · Genitourinary Comments	porter catheter in situ	  · Extremities	detailed exam	  · Extremities Details	no cyanosis; pedal edema	  · Pedal Edema Severity	1+	  · Vascular	Equal and normal pulses (carotid, femoral, dorsalis pedis)	  · Skin	detailed exam	  · Skin Details	warm and dry	  · Musculoskeletal	detailed exam	  · Musculoskeletal Details	normal strength	      DVT Prophylaxis:    Advanced Directives: DNR  Discussed with: HCP/Daughter    Visit Information: Critical care time 60 min    ** Time is exclusive of billed procedures and/or teaching and/or routine family updates.
· Chief Complaint: The patient is a 67y Female complaining of unresponsive.	  · Unable to Obtain: Unresponsive	  · Details: Unresponsive	  · HPI Objective Statement: 68 y/o F PMHx COPD, CVA, HTN, Hypothyroid, pulm htn, CHF, on coumadin, asa, presents to the ED s/p unresponsive. The pt was BIBA after being found unresponsive in her bathroom while having a bowel movement. Further history unavailable as pt is unresponsive.	    above d.w Dr Anthony who initially evaluated and treated the patient.    Admitted with diagnosis of severe pancreatitis - intubated, on pressor for support of MAP.    pt seen initially at 8am and then hourly over the course of the day - d/w charge and bedside RN's, D/w Dr Bailey and Dr Marin.    Empiric abx changed to meropenem    Pt anuric despite aggressive hydration and NaHCO3 infusion - remains on levophed infusion and vent support.    Coagulopathy due to coumadin also noted a treated with vitamin K 2.5mg PO x 1 - no evidence of bleeding at this time.    Pt off sedation but severely encephalopathic.    Pt daughter at bedside at 18:30 case discussed in detail including poor prognosis, current issues and plan for further care.    PAST MEDICAL/SURGICAL/FAMILY/SOCIAL HISTORY:    Past Medical History:  Atheroma    COPD (chronic obstructive pulmonary disease)    CVA (cerebral vascular accident)    Diastolic congestive heart failure    HTN (hypertension)    Hypothyroidism    Moderate to severe mitral regurgitation    Obesity    Pulmonary hypertension    Severe tricuspid regurgitation    Systolic heart failure.    ICU Vital Signs Last 24 Hrs  T(C): 37.2 (2017 18:30), Max: 37.9 (2017 16:45)  T(F): 98.9 (2017 18:30), Max: 100.2 (2017 16:45)  HR: 77 (2017 18:30) (77 - 115)  BP: 104/68 (2017 18:30) (66/51 - 186/167)  BP(mean): --  ABP: --  ABP(mean): --  RR: 16 (2017 18:30) (6 - 19)  SpO2: 100% (2017 18:30) (75% - 100%) (2017 20:13)      PAST MEDICAL & SURGICAL HISTORY:  Hypothyroidism  Atheroma  COPD (chronic obstructive pulmonary disease)  Severe tricuspid regurgitation  Moderate to severe mitral regurgitation  Pulmonary hypertension  Obesity  CVA (cerebral vascular accident)  Diastolic congestive heart failure  Systolic heart failure  HTN (hypertension)  History of       FAMILY HISTORY:  No pertinent family history in first degree relatives      Social Hx:    Allergies    No Known Drug Allergies  Originally Entered as [see Comment: &quot;&quot;ALLERGIC TO DOG/CAT/HORSE HAIR;PLANTS: SNEEZING,SCRATCHY THROAT,STUFFY NOSE,ITCHY EYES] reaction to [Animals] (Unknown)      ICU Vital Signs Last 24 Hrs  T(C): 37.4 (2017 17:30), Max: 38.8 (2017 14:00)  T(F): 99.3 (2017 17:30), Max: 101.9 (2017 14:00)  HR: 104 (2017 17:30) (78 - 110)  BP: 111/49 (2017 17:30) (86/36 - 139/99)  BP(mean): 61 (2017 17:30) (33 - 107)  ABP: --  ABP(mean): --  RR: 27 (2017 17:30) (14 - 30)  SpO2: 99% (2017 17:12) (96% - 100%)      Mode: AC/ CMV (Assist Control/ Continuous Mandatory Ventilation)  RR (machine): 12  TV (machine): 500  FiO2: 40  PEEP: 5  ITime: 1  PIP: 24      I&O's Summary    2017 07:  -  2017 07:00  --------------------------------------------------------  IN: 6780.3 mL / OUT: 17 mL / NET: 6763.3 mL    2017 07:  -  2017 19:21  --------------------------------------------------------  IN: 3410.5 mL / OUT: 0 mL / NET: 3410.5 mL                              11.9   14.4  )-----------( 405      ( 2017 06:09 )             36.8       11    144  |  115<H>  |  58<H>  ----------------------------<  73  4.4   |  14<L>  |  3.81<H>    Ca    5.6<LL>      2017 18:00  Phos  3.5       Mg     1.4         TPro  4.8<L>  /  Alb  1.7<L>  /  TBili  2.3<H>  /  DBili  x   /  AST  2209<H>  /  ALT  1623<H>  /  AlkPhos  197<H>        CAPILLARY BLOOD GLUCOSE          LIVER FUNCTIONS - ( 2017 18:00 )  Alb: 1.7 g/dL / Pro: 4.8 gm/dL / ALK PHOS: 197 U/L / ALT: 1623 U/L / AST: 2209 U/L / GGT: x             CARDIAC MARKERS ( 2017 22:50 )  0.250 ng/mL / x     / x     / x     / x      CARDIAC MARKERS ( 2017 19:45 )  0.161 ng/mL / x     / x     / x     / x      CARDIAC MARKERS ( 2017 17:33 )  0.088 ng/mL / x     / 231 U/L / x     / x      CARDIAC MARKERS ( 2017 16:41 )  x     / x     / 335 U/L / x     / x            PT/INR - ( 2017 08:57 )   PT: 79.3 sec;   INR: 7.06 ratio         PTT - ( 2017 17:09 )  PTT:38.6 sec    ABG - ( 2017 20:44 )  pH: 7.21  /  pCO2: 34    /  pO2: 148   / HCO3: 13    / Base Excess: -14   /  SaO2: 98          Urinalysis Basic - ( 2017 17:05 )    Color: Deann / Appearance: very cloudy / S.025 / pH: x  Gluc: x / Ketone: Trace  / Bili: Moderate / Urobili: 4 mg/dL   Blood: x / Protein: 100 mg/dL / Nitrite: Positive   Leuk Esterase: Trace / RBC: 6-10 /HPF / WBC 0-2   Sq Epi: x / Non Sq Epi: Occasional / Bacteria: TNTC        MEDICATIONS  (STANDING):  aspirin  chewable 81 milliGRAM(s) Oral daily  calcium gluconate IVPB 2 Gram(s) IV Intermittent once  dextrose 5% 1000 milliLiter(s) (200 mL/Hr) IV Continuous <Continuous>  influenza   Vaccine 0.5 milliLiter(s) IntraMuscular once  levothyroxine 175 MICROGram(s) Oral daily  magnesium sulfate  IVPB 1 Gram(s) IV Intermittent once  meropenem IVPB 500 milliGRAM(s) IV Intermittent every 12 hours  norepinephrine Infusion 0.01 MICROgram(s)/kG/Min (1.701 mL/Hr) IV Continuous <Continuous>  pantoprazole  Injectable 40 milliGRAM(s) IV Push daily  phenylephrine    Infusion 0.5 MICROgram(s)/kG/Min (17.006 mL/Hr) IV Continuous <Continuous>  propofol Infusion 5 MICROgram(s)/kG/Min (2.721 mL/Hr) IV Continuous <Continuous>  sodium chloride 0.9%. 1000 milliLiter(s) (100 mL/Hr) IV Continuous <Continuous>    MEDICATIONS  (PRN):  acetaminophen    Suspension 650 milliGRAM(s) Oral every 6 hours PRN pain and temp  LORazepam   Injectable 1 milliGRAM(s) IV Push every 2 hours PRN Agitation          DVT Prophylaxis:    Advanced Directives: DNR  Discussed with: HCP/Daughter    Visit Information: Critical care time 75 min    ** Time is exclusive of billed procedures and/or teaching and/or routine family updates.

## 2017-11-18 NOTE — DISCHARGE NOTE FOR THE EXPIRED PATIENT - SECONDARY DIAGNOSIS.
Acute renal failure with tubular necrosis Cerebrovascular accident (CVA), unspecified mechanism Morbid obesity due to excess calories Acute respiratory failure with hypoxia Septic shock due to undetermined organism Elevated INR [place] coma scale total score 3-8, 24 hours or more after hospital admission Functional quadriplegia

## 2017-11-18 NOTE — PROGRESS NOTE ADULT - ASSESSMENT
67y with acute hemorrhagic pancreatitis. Possible intraabdominal necrosis/ pus pockets/ fluid collection  AMS secondary to metabolic encephalopathy
68yo F suffering from severe hemorrhagic pancreatitis likely secondary to gallstone/panc stone - which may have now passed  shock due to SIRS +/- pancreatic infection and severe sepsis/septic shock  acute respiratory failure  NATHALIE due to ATN, anuria initially now oliguria - No acute indication for dialysis  metabolic acidosis  hypocalcemia due to pancreatitis  metabolic encephalopathy  MRCP with No evidence of panc duct/panc stones.  coagulopathy due to coumadin with liver dysfunction - improved s/p vitamin K     High risk for deterioration and morbidity and mortality    Plan at this time is for continued care in the ICU   HOB 30  IVF  pressors to support MAP 65mmHg  empiric ABx - meropenem  monitor INR - monitor for s/s of bleeding  Re-dose vitamin K  strict I/I - maintain urinary catheter for now  GI and DVT prophylaxis as appropriate  GI input appreciated  ID input appreciated  Nephrology input appreciated  Surgical consult appreciated  repeat labs 20:00  Ionized Ca sent this am - result pending.  No indication for ERCP at this time.  supportive care    discussed with Surg, GI, ID, Renal - at this time would continue current management and hold off on any imaging that would require IV contrast as pts recovery renal function is not yet certain and IV contrast would cause irreversible kidney damage requiring institution of HD.      At this time CT with IV contrast would not be particularly helpful and if consideration for sampling, can likely do with non-contrast CT or ultrasound.    severity of illness clearly explained to pts daughter - all questions answered.  pt is DNR
66 y/o F PMHx COPD, CVA, HTN, Hypothyroid, pulm htn, CHF, on coumadin, asa, BIBA after being found unresponsive, found to have severe pancreatitis. Has been anuric since admission - renal called for evaluation.    NATHALIE / anuric / renal failure likely ATN  - due to severe pancreatitis  - receiving resuscitation s/p bicarb drip, now on NS - cont  - pressor support to maintain MAP -- currently on Levophed/Vasopressin  - currently acidosis stable, no hyperkalemia, and oxygenation controlled (intubated)  - NS hydration  - no acute need for dialysis now, patient would be high risk of decompensation if required dialysis due to multiple pressor requirement  - slight increase in UOP - monitor, currently no signs of meaningful recovery  - no acute need for dialysis today but will assess daily -- dialysis will be difficult in setting of pressor support -- will require likely increase in pressor dosing to maintain BP while being dialyzed but can anticipate hemodynamic instability / hypotension   - will assess daily for dialysis need. If no significant improvement in hemodynamics / blood pressure / urine output, will likely need HD initiation in next 48-72 hours. if in lines with goals of care    Hypocalcemia  -Stopped bicarbonate gtt, stable serum bicarbonate and alkalosis can enhance calcium-albumin complex binding  - ionized ca++ low - cont IV repletion per ICU, likely secondary to severe pancreatitis  -Follow phos  -Replete Mg to assist with Ca++ absorption    Acid/base  -Intubated, serum bicarb stable  -off Bicarb gtt as above  -Follow serum values, abg per ICU team      Critically Ill, poor prognosis    Case d/w Dr. Naidu.  Case d/c with ALEXANDREA
66yo F suffering from severe hemorrhagic pancreatitis likely secondary to gallstone - shock due to SIRS +/- pancreatic infection and severe sepsis/septic shock  acute respiratory failure  NATHALIE due to ATN, anuria  metabolic acidosis  hypocalcemia due to pancreatitis  metabolic encephalopathy  coagulopathy due to coumadin with liver dysfunction - improved s/p vitamin K    High risk for deterioration and morbidity and mortality    Plan at this time is for continued care in the ICU   HOB 30  IVF  NaHCO3 infusion  pressors to support MAP 65mmHg  empiric ABx - meropenem  monitor INR - monitor for s/s of bleeding  strict I/I - maintain urinary catheter for now  GI and DVT prophylaxis as appropriate  GI consult appreciated  ID consult appreciated  repeat labs 23:00  supportive care    severity of illness clearly explained to pts daughter - all questions answered.  pt is DNR
66yo F suffering from severe hemorrhagic pancreatitis likely secondary to gallstone/panc stone - which may have now passed  shock due to SIRS +/- pancreatic infection and severe sepsis/septic shock  acute respiratory failure  NATHALIE due to ATN, anuria initially now oliguria - No acute indication for dialysis  metabolic acidosis  hypocalcemia due to pancreatitis  metabolic encephalopathy  coagulopathy due to coumadin with liver dysfunction - improved s/p vitamin K    High risk for deterioration and morbidity and mortality    Plan at this time is for continued care in the ICU   HOB 30  IVF  d/c HCO3 infusion  pressors to support MAP 65mmHg  empiric ABx - meropenem  monitor INR - monitor for s/s of bleeding  strict I/I - maintain urinary catheter for now  GI and DVT prophylaxis as appropriate  GI input appreciated  ID input appreciated  repeat labs 20:00  Ionized Ca sent this am - result pending.  MRCP as d/w GI  supportive care    severity of illness clearly explained to pts daughter - all questions answered.  pt is DNR
67-year-old female with  Severe pancreatitis associated with possible hemorrhage as well as SIRS  . This is associated with renal failure and  respiratory failure and metabolic acidosis.  Hypocalcemia likely secondary to saponification associated with pancreatitis, would supplement  Metabolic encephalopathy.  being assessed for possible HD  Would continue IV fluids, elevate head of bed, pressors.  Empiric antibiotics  elevated WBC and LGF noted, if cont would repeat CT with contrast to assess for infected necrosis      MRCP appreciated and reviewed with Dr Boone    ID consult appreciated.    - found to have Klebsiella pneumoniae in urine  - coagulase negative staph most likely skin contaminant    I had an extensive discussion with the patient's daughter regarding patient's prognosis. Options of transferring to a tertiary care center also reviewed with her.  At this point, she would like to continue treatment.  She is obviously overwhelmed by all that is going on with her mother.  Case also discussed with intensivist
67-year-old female with  Severe pancreatitis associated with possible hemorrhage as well as SIRS  . This is associated with renal failure and  respiratory failure and metabolic acidosis.  Hypocalcemia likely secondary to saponification associated with pancreatitis, would supplement  Metabolic encephalopathy.  being assessed for possible HD  Would continue IV fluids, elevate head of bed, pressors.  Empiric antibiotics  elevated WBC and LGF noted,conssider surgical consult and CT for infected necrosis, DW Dr Gomez and he will DW renal   MOM left for Daughter    cont inc WBC and LGF concerning for possible infected necrosis      MRCP appreciated     ID consult appreciated.
67-year-old female with  Severe pancreatitis associated with possible hemorrhage as well as SIRS  . This is associated with renal failure and  respiratory failure and metabolic acidosis.  Hypocalcemia likely secondary to saponification associated with pancreatitis.  Metabolic encephalopathy.  being assessed for possible HD  Would continue IV fluids, elevate head of bed, sodium bicarbonate infusion, pressors.  Empiric antibiotics    Additionally, would consider MRCP once patient more stable, Z[facility] Gomez and they are arranging  ID consult appreciated.    - found to have Klebsiella pneumoniae in urine  - coagulase negative staph most likely skin contaminant
67-year-old female with  Severe pancreatitis associated with possible hemorrhage as well as SIRS  . This is associated with renal failure smokeless respiratory failure and metabolic acidosis.  Hypocalcemia likely secondary to saponification associated with pancreatitis.  Metabolic encephalopathy.    Would continue IV fluids, elevate head of bed, sodium bicarbonate infusion, pressors.  Empiric antibiotics    Additionally, would consider MRCP once patient more stable.  ID consult appreciated.    - found to have Klebsiella pneumoniae in urine  - coagulase negative staph most likely skin contaminant
68 y/o F PMHx COPD, CVA, HTN, Hypothyroid, pulm htn, CHF, on coumadin, asa, BIBA after being found unresponsive, found to have severe pancreatitis, renal evaluation of NATHALIE.    NATHALIE-Setting of severe pancreatitis/Likely ATN  - pressor support to maintain MAP, currently on Levophed/Vasopressin  - currently acidosis stable, no hyperkalemia, and oxygenation controlled (intubated)  - NS hydration  - no acute need for dialysis now, patient would be high risk of decompensation if required dialysis due to multiple pressor requirement  - D/c above with daughter via phone and she states clear understanding  - If condition does not stabilize, may need HD initiation in next 24-48 hours if in lines with Goals of Care    Hypocalcemia  -Would stop bicarbonate gtt, stable serum bicarbonate and alkalosis can enhance calcium-albumin complex binding  -Check ionized ca++  -Calcium repletion per ICU, likely secondary to severe pancreatitis  -Follow phos  -Replete Mg to assist with Ca++ absorption    Acid/base  -Intubated, serum bicarb stable  -Bicarb gtt as above  -Follow serum values, abg per ICU team      Critically Ill, poor prognosis    Case d/w Dr. Naidu.  Case d/c with RN  Case d/c with Daughter shante via phone    60 minutes critical care time spent in the care of this patient
68 y/o F PMHx COPD, CVA, HTN, Hypothyroid, pulm htn, CHF, on coumadin, asa, BIBA after being found unresponsive, found to have severe pancreatitis.     NATHALIE   - due to severe pancreatitis, non-oliguric with stable K  - No acute HD needed now.   - With noted neurologic involement as well ,aggressive measures would be futile    Hypocalcemia  -Correct phos  -Carafate if feeds continued 1 gram qac    Acid/base  -Intubated, serum bicarb stable  -off bicarb gtt      Grim prognosis  Family meeting    d/c with Dr. Garcia
68 y/o F PMHx COPD, CVA, HTN, Hypothyroid, pulm htn, CHF, on coumadin, asa, BIBA after being found unresponsive, found to have severe pancreatitis. Has been anuric since admission - renal called for evaluation.    NATHALIE / anuric / renal failure likely ATN  - due to severe pancreatitis  - receiving resuscitation s/p bicarb drip, now on NS - cont  - pressor support to maintain MAP -- currently on Levophed/Vasopressin - weaning Levophed as tolerated, BP improved  - currently acidosis stable, no hyperkalemia, and oxygenation controlled (intubated)  - NS hydration  - no acute need for dialysis now, patient would be high risk of decompensation if required dialysis due to multiple pressor requirement  - slight increase in UOP and SCr appears to have peaked at 6.14 -- monitor for recovery      Hypocalcemia  -Stopped bicarbonate gtt, stable serum bicarbonate and alkalosis can enhance calcium-albumin complex binding  - ionized ca++ low - cont IV repletion per ICU, likely secondary to severe pancreatitis  -Follow phos  -Replete Mg to assist with Ca++ absorption    Acid/base  -Intubated, serum bicarb stable  -off Bicarb gtt as above  -Follow serum values, abg per ICU team    Severe Pancreatitis with necrosis  - surgery consult  - discussion of contrast based study - defer if possible as renal function will likely be permanently compromised.       Critically Ill, poor prognosis    Case d/w Dr. Naidu, Resident, RN and dialysis staff.    Sha Cristina and Marisol to cover service through 11/26. Will resume care 11/27.
68 yo female with hemorrhagic pancreatitis and multisystem organ failure. On pressors with elevated INR. To start oral feedings. Prognosis is guarded at this point.
68yo F suffering from severe hemorrhagic pancreatitis likely secondary to gallstone/panc stone - which may have now passed  shock due to SIRS +/- pancreatic infection and severe sepsis/septic shock  acute respiratory failure  NATHALIE due to ATN, anuria initially now oliguria - No acute indication for dialysis  metabolic acidosis  hypocalcemia due to pancreatitis  metabolic encephalopathy  MRCP with No evidence of panc duct/panc stones.  coagulopathy due to coumadin with liver dysfunction - improved s/p vitamin K     High risk for deterioration and morbidity and mortality    Plan at this time is for continued care in the ICU   HOB 30  IVF  pressors to support MAP 65mmHg  empiric ABx - meropenem  monitor INR - monitor for s/s of bleeding  Re-dose vitamin K  strict I/I - maintain urinary catheter for now  GI and DVT prophylaxis as appropriate  GI input appreciated  ID input appreciated  repeat labs 20:00  Ionized Ca sent this am - result pending.  No indication for ERCP at this time.  supportive care    severity of illness clearly explained to pts daughter - all questions answered.  pt is DNR
68yo F suffering from severe hemorrhagic pancreatitis likely secondary to gallstone/panc stone - which may have now passed  shock due to SIRS +/- pancreatic infection and severe sepsis/septic shock  acute respiratory failure  NATHALIE due to ATN, anuria initially now oliguria - No acute indication for dialysis  metabolic acidosis  hypocalcemia due to pancreatitis  metabolic encephalopathy  MRCP with No evidence of panc duct/panc stones.  coagulopathy due to coumadin with liver dysfunction - improved s/p vitamin K     new finding of massive holohemispheric RIGHT CVA on CT 11/18 with severe edema and midline shift  likely will progress to herniation    High risk for deterioration and morbidity and mortality    Plan at this time is for continued care in the ICU   will discuss poor prognosis again with daughter when she comes as well as GOC and possible compassionate liberation from the vent.  at this time any further aggressive management of the pancreatitis would be nonproductive.  Will continue current supportive measures until can be discussed with pts daughter.    HOB 30  IVF  pressors to support MAP 65mmHg  empiric ABx - meropenem  monitor INR - monitor for s/s of bleeding  Re-dose vitamin K  strict I/I - maintain urinary catheter for now  GI and DVT prophylaxis as appropriate  GI input appreciated  ID input appreciated  Nephrology input appreciated  Surgical consult appreciated  No indication for ERCP at this time.  supportive care    discussed with Surg, GI, ID, Renal - at this time would continue current management and hold off on any imaging that would require IV contrast as pts recovery renal function is not yet certain and IV contrast would cause irreversible kidney damage requiring institution of HD.      At this time CT with IV contrast would not be particularly helpful and if consideration for sampling, can likely do with non-contrast CT or ultrasound.    severity of illness clearly explained to pts daughter - all questions answered.  pt is DNR
Patient is  66 y/o F PMHx COPD, CVA, HTN, Hypothyroid, pulm htn, CHF, on coumadin, asa, obesity admitted on 11/12 after being found at home on bathroom floor unresponsive; details unclear; history per medical record as patient is currently intubated, on pressor support and cannot provide history.  1. Patient admitted with septic shock, possibly secondary to hemorrhagic pancreatitis, versus sepsis of urinary origin; acute acidosis  - ventilatory support per icu  - follow up cultures   - found to have Klebsiella pneumoniae in urine  - coagulase negative staph most likely skin contaminant  - iv hydration and supportive care   - pressor support per icu  - will optimize antibiotics to meropenem given the hemorrhagic pancreatitis, this will cover the urine as well; versus early pneumonia  - serial cbc and monitor temperature   2. other issues; COPD, CVA, HTN, Hypothyroid, pulm htn, CHF, on coumadin, asa, obesity  - per medicine
Patient is  66 y/o F PMHx COPD, CVA, HTN, Hypothyroid, pulm htn, CHF, on coumadin, asa, obesity admitted on 11/12 after being found at home on bathroom floor unresponsive; details unclear; history per medical record as patient is currently intubated, on pressor support and cannot provide history.  1. Patient admitted with septic shock, possibly secondary to hemorrhagic pancreatitis, versus sepsis of urinary origin; acute acidosis  - ventilatory support per icu, multiple pressors  - will decrease dose of meropenem to 500 mg daily   - found to have Klebsiella pneumoniae in urine sensitive to meropenem  - coagulase negative staph most likely skin contaminant  - iv hydration and supportive care   - pressor support per icu  - serial cbc and monitor temperature   2. other issues; COPD, CVA, HTN, Hypothyroid, pulm htn, CHF, on coumadin, asa, obesity  - per medicine
Patient is  68 y/o F PMHx COPD, CVA, HTN, Hypothyroid, pulm htn, CHF, on coumadin, asa, obesity admitted on 11/12 after being found at home on bathroom floor unresponsive; details unclear; history per medical record as patient is currently intubated, on pressor support and cannot provide history.  1. Patient admitted with septic shock, possibly secondary to hemorrhagic pancreatitis  - now found to have acute catastrophic CVA  - family to evaluate further goals of care  - ventilatory support per icu, multiple pressors  - meropenem to 500 mg daily, day #6  - found to have Klebsiella pneumoniae in urine sensitive to meropenem  - coagulase negative staph most likely skin contaminant  - day #2 mycamine 100 mg q day to provide fungal coverage  - iv hydration and supportive care   - pressor support per icu  - serial cbc and monitor temperature   2. other issues; COPD, CVA, HTN, Hypothyroid, pulm htn, CHF, on coumadin, asa, obesity  - per medicine
Patient is  68 y/o F PMHx COPD, CVA, HTN, Hypothyroid, pulm htn, CHF, on coumadin, asa, obesity admitted on 11/12 after being found at home on bathroom floor unresponsive; details unclear; history per medical record as patient is currently intubated, on pressor support and cannot provide history.  1. Patient admitted with septic shock, possibly secondary to hemorrhagic pancreatitis, versus sepsis of urinary origin; acute acidosis  - ventilatory support per icu, multiple pressors  - will decrease dose of meropenem to 500 mg daily, day #4  - found to have Klebsiella pneumoniae in urine sensitive to meropenem  - coagulase negative staph most likely skin contaminant  - iv hydration and supportive care   - pressor support per icu  - serial cbc and monitor temperature   2. other issues; COPD, CVA, HTN, Hypothyroid, pulm htn, CHF, on coumadin, asa, obesity  - per medicine
Patient is  68 y/o F PMHx COPD, CVA, HTN, Hypothyroid, pulm htn, CHF, on coumadin, asa, obesity admitted on 11/12 after being found at home on bathroom floor unresponsive; details unclear; history per medical record as patient is currently intubated, on pressor support and cannot provide history.  1. Patient admitted with septic shock, possibly secondary to hemorrhagic pancreatitis, versus sepsis of urinary origin; acute acidosis  - ventilatory support per icu, multiple pressors  - will decrease dose of meropenem to 500 mg daily, day #5  - found to have Klebsiella pneumoniae in urine sensitive to meropenem  - coagulase negative staph most likely skin contaminant  - will start mycamine 100 mg q day to provide fungal coverage  - iv hydration and supportive care   - pressor support per icu  - serial cbc and monitor temperature   2. other issues; COPD, CVA, HTN, Hypothyroid, pulm htn, CHF, on coumadin, asa, obesity  - per medicine
66yo F suffering from severe hemorrhagic pancreatitis likely secondary to gallstone - shock due to SIRS +/- pancreatic infection and severe sepsis/septic shock  acute respiratory failure  NATHALIE due to ATN  metabolic acidosis  hypocalcemia due to pancreatitis  metabolic encephalopathy  coagulopathy due to coumadin with liver dysfunction    High risk for deterioration and morbidity and mortality    Plan at this time is for continued care in the ICU   HOB 30  IVF  NaHCO3 infusion  pressors to support MAP 65mmHg  empiric ABx - meropenem  monitor INR - monitor for s/s of bleeding  strict I/I - maintain urinary catheter for now  GI and DVT prophylaxis as appropriate  GI consult appreciated  ID consult appreciated  repeat labs 23:00  supportive care    severity of illness clearly explained to pts daughter - all questions answered.  pt is DNR

## 2017-11-18 NOTE — DISCHARGE NOTE FOR THE EXPIRED PATIENT - HOSPITAL COURSE
67y Female unresponsive on arrival to  ED on 11/12/17 - PMHx COPD, CVA, HTN, Hypothyroid, pulm htn, CHF, on coumadin, asa, - found unresponsive in her bathroom while having a bowel movement.   Admitted with diagnosis of severe hemorrhagic pancreatitis - intubated, on pressor for support of MAP.    Case d/w ID and GI.  Empiric abx changed to meropenem.      Pt anuric despite aggressive hydration and NaHCO3 infusion - requiring levophed infusion and vent support.    Coagulopathy due to coumadin also noted a treated with vitamin K 2.5mg PO x 1 - no evidence of bleeding.    Pt taken off sedation but severely encephalopathic and persistently unresponsive.    Pt daughter at bedside and case discussed in detail including poor prognosis, current issues and plan for further care.    Over the next few days pt remain critically ill on vasopressor support and vent support.  Daily discussions with ID, Nephro, GI regarding appropriate course of action.    notably pt with coag neg staph in 1 blood culture from admission, likely a contaminant.    Pt started to make modest amount of urine and had no acute indications for HD.    MRCP did not show any pancreatic stones or abscesses that would require drainage.    On 11/18 pt sent for CT abdomen to assess progression of pancreatitis, and also for CT head to assess for occult cause of AMS given failure to resolve encephalopathy over last few days.  CT head with massive holohemispheric RIGHT infarction with sig midline shift RIGHT to LEFT.  PE also demonstrated RIGHT pupillary dilatation (discordance of pupils) that had not previously been present.  She also appears to be exhibiting extensor posturing to noxious stimuli.    Pt 2 pressors and intubated-  High likelihood of progression to brainstem herniation given severity of CVA and worsening edema.    Discussed with daughter via telephone and all questions answered.      Multiple family members came to bedside and CT head reviewed with them as well as clinical exam findings and prognosis.    Notably daughter explained that pt had been diagnosed with 99% carotid stenosis within the last few months.    we discussed current issues including massive holohemispheric RIGHT CVA, persistence of severe hemorrhagic pancreatitis, acute respiratory failure, shock, and acute renal failure    Clearly the most immediately devastating issue being the CVA as pt could either herniate due to the edema, or survive, but be completely neurologically debilitated.    I reviewed the CT images with the family so they could get an understanding of the magnitude of the injury.    Given current prognosis the pts daughter (and other family) did not feel that the pt would want to continue aggressive tx and they wanted to transition to CMO    We discussed several options that all include palliative analgesia including: observation understanding that herniation might occur, stopping pressors, and finally liberation from the ventilator.    After discussing with the family I asked Father Ramón to come and pray with family.    Pt started on low dose dilaudid infusion liberated from pressor support and vent support.    She remained comfortably with family at bedside and passed peacefully at 14:43 on 11/18/17.    Death Cert completed electronically and staff to inform Dr Lam of pts demise.    Family declined offer for autopsy.

## 2017-11-18 NOTE — DISCHARGE NOTE FOR THE EXPIRED PATIENT - OTHER SIGNIFICANT FINDINGS
Discharge diagnoses:    Acute hypoxic respiratory failure  NATHALIE/ATN  morbid obesity  shock due to sepsis vs SIRS  acute severe hemorrhagic pancreatitis with necrosis likely to be infected   functional quadriplegia  coma  massive RIGHT CVA/holohemispheric infarction/internal carotid occlusion  elevated INR/coagulopathy due to coumadin  gallstone pancreatitis

## 2017-11-20 DIAGNOSIS — Z79.01 LONG TERM (CURRENT) USE OF ANTICOAGULANTS: ICD-10-CM

## 2017-11-20 DIAGNOSIS — A41.9 SEPSIS, UNSPECIFIED ORGANISM: ICD-10-CM

## 2017-11-20 DIAGNOSIS — G93.41 METABOLIC ENCEPHALOPATHY: ICD-10-CM

## 2017-11-20 DIAGNOSIS — E86.0 DEHYDRATION: ICD-10-CM

## 2017-11-20 DIAGNOSIS — E03.9 HYPOTHYROIDISM, UNSPECIFIED: ICD-10-CM

## 2017-11-20 DIAGNOSIS — I63.9 CEREBRAL INFARCTION, UNSPECIFIED: ICD-10-CM

## 2017-11-20 DIAGNOSIS — J44.9 CHRONIC OBSTRUCTIVE PULMONARY DISEASE, UNSPECIFIED: ICD-10-CM

## 2017-11-20 DIAGNOSIS — E66.01 MORBID (SEVERE) OBESITY DUE TO EXCESS CALORIES: ICD-10-CM

## 2017-11-20 DIAGNOSIS — R65.21 SEVERE SEPSIS WITH SEPTIC SHOCK: ICD-10-CM

## 2017-11-20 DIAGNOSIS — R53.2 FUNCTIONAL QUADRIPLEGIA: ICD-10-CM

## 2017-11-20 DIAGNOSIS — I11.0 HYPERTENSIVE HEART DISEASE WITH HEART FAILURE: ICD-10-CM

## 2017-11-20 DIAGNOSIS — Z79.899 OTHER LONG TERM (CURRENT) DRUG THERAPY: ICD-10-CM

## 2017-11-20 DIAGNOSIS — J96.01 ACUTE RESPIRATORY FAILURE WITH HYPOXIA: ICD-10-CM

## 2017-11-20 DIAGNOSIS — I50.22 CHRONIC SYSTOLIC (CONGESTIVE) HEART FAILURE: ICD-10-CM

## 2017-11-20 DIAGNOSIS — E16.2 HYPOGLYCEMIA, UNSPECIFIED: ICD-10-CM

## 2017-11-20 DIAGNOSIS — Z79.82 LONG TERM (CURRENT) USE OF ASPIRIN: ICD-10-CM

## 2017-11-20 DIAGNOSIS — K85.12 BILIARY ACUTE PANCREATITIS WITH INFECTED NECROSIS: ICD-10-CM

## 2017-11-20 DIAGNOSIS — N17.0 ACUTE KIDNEY FAILURE WITH TUBULAR NECROSIS: ICD-10-CM

## 2017-11-20 DIAGNOSIS — R41.82 ALTERED MENTAL STATUS, UNSPECIFIED: ICD-10-CM

## 2017-11-20 DIAGNOSIS — E83.51 HYPOCALCEMIA: ICD-10-CM

## 2017-11-20 DIAGNOSIS — I27.20 PULMONARY HYPERTENSION, UNSPECIFIED: ICD-10-CM

## 2017-11-22 DIAGNOSIS — G93.6 CEREBRAL EDEMA: ICD-10-CM

## 2017-12-04 ENCOUNTER — APPOINTMENT (OUTPATIENT)
Dept: NEUROLOGY | Facility: CLINIC | Age: 67
End: 2017-12-04

## 2020-03-14 NOTE — PATIENT PROFILE ADULT. - NS PRO OT REFERRAL QUES 1 YN
1) PCP Contacted on Admission: (Y/N) --> Name & Phone #:  2) Date of Contact with PCP:  3) PCP Contacted at Discharge: (Y/N, N/A)  4) Summary of Handoff Given to PCP:   5) Post-Discharge Appointment Date and Location: no

## 2021-07-16 NOTE — ED PROVIDER NOTE - CROS ED ROS STATEMENT
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     all other ROS negative except as per HPI

## 2022-01-20 NOTE — ASU PATIENT PROFILE, ADULT - VISION (WITH CORRECTIVE LENSES IF THE PATIENT USUALLY WEARS THEM):
Patient scheduled 2-1 Normal vision: sees adequately in most situations; can see medication labels, newsprint 20

## 2022-01-22 NOTE — ED STATDOCS - NS_ ATTENDINGSCRIBEDETAILS _ED_A_ED_FT
No I, Froilan Murguia MD,  performed the initial face to face bedside interview with this patient regarding history of present illness, review of symptoms and relevant past medical, social and family history.  I completed an independent physical examination.    The history, relevant review of systems, past medical and surgical history, medical decision making, and physical examination was documented by the scribe in my presence and I attest to the accuracy of the documentation.

## 2022-05-03 NOTE — PROGRESS NOTE ADULT - PEDAL EDEMA SEVERITY
1+
Patient with possible sepsis, POA.   Will continue IV hydration and empiric antibiotics.   Supportive care.

## 2023-03-09 NOTE — PATIENT PROFILE ADULT. - FALL HARM RISK
Apologies, but I am no longer filling meds for this patient. I have not seen her for a visit since 4/2019. I will not fill meds without actually seeing her.   surgery

## 2023-06-11 NOTE — PATIENT PROFILE ADULT. - HEALTHCARE QUESTIONS, PROFILE
Goal Outcome Evaluation:  Problem: Sleep Disturbance  Goal: Adequate Sleep/Rest  Outcome: Ongoing, Declining    Focus: Shift summary    Data: Patient slept 3.5 hours last night. Patient was given trazodone 50 mg at 0121 for sleep promotion; patient continued to have difficulty sleeping after intervention throughout the night; intermittently loudly talking to self in room - redirected multiple times by RN. Respirations even and unlabored on status 15 checks. Will continue to monitor and report to oncoming staff.    Response: Report sleep hours to day shift. Continue to monitor patient and provide therapeutic interventions as necessary.     when can I go home

## 2023-12-01 NOTE — ED PROVIDER NOTE - CPE EDP ENMT NORM
Physical Therapy Evaluation    Patient Name:  Naila Galeas   MRN:  [id number]    Recommendations:     Discharge Recommendations: No Therapy Indicated   Discharge Equipment Recommendations:  (knee scooter)   Barriers to discharge: None    Assessment:     Naila Galeas is a 29 y.o. female admitted with a medical diagnosis of Right ankle pain.  She presents with the following impairments/functional limitations: gait instability, decreased ROM Patient with good use of crutches and knee scooter nwb..    Rehab Prognosis: Good; patient would benefit from acute skilled PT services to address these deficits and reach maximum level of function.    Recent Surgery: Procedure(s) (LRB):  REPAIR, TENDON, LOWER EXTREMITY (Right) Day of Surgery    Plan:     During this hospitalization, patient to be seen 1 x/week to address the identified rehab impairments via gait training and progress toward the following goals:    Plan of Care Expires:  12/01/23    Subjective     Chief Complaint: post op pain  Patient/Family Comments/goals: plan is home today. Patient request knee scooter for home  Pain/Comfort:  Pain Rating 1: 5/10  Location - Side 1: Right  Location 1: ankle  Pain Addressed 1: Cessation of Activity, Pre-medicate for activity    Patients cultural, spiritual, Taoism conflicts given the current situation: no    Living Environment:  Lives with spouse, 5 steps entering home  Prior to admission, patients level of function was independent.  Equipment used at home: crutches, axillary.  DME owned (not currently used): none.  Upon discharge, patient will have assistance from family.    Objective:     Communicated with nurse prior to session.  Patient found supine with    upon PT entry to room.    General Precautions: Standard, fall  Orthopedic Precautions:    Braces:    Respiratory Status: Room air    Exams:  na    Functional Mobility:  Gait: ambulated 20 feet with crutches nwb right le      AM-PAC 6 CLICK MOBILITY  Total  Score:18       Treatment & Education:  Knee scooter x 20 feet  Up down steps cga with crutches nwb     Patient left supine with all lines intact.    GOALS:   Multidisciplinary Problems       Physical Therapy Goals       Not on file                    History:     Past Medical History:   Diagnosis Date    Anxiety        History reviewed. No pertinent surgical history.    Time Tracking:     PT Received On: 12/01/23  PT Start Time: 1040     PT Stop Time: 1055  PT Total Time (min): 15 min     Billable Minutes: Evaluation 15      12/01/2023   normal...

## 2024-04-11 NOTE — ED ADULT NURSE NOTE - CAS TRG GENERAL NORM CIRC DET
Assessment and Plan     1. Upper respiratory tract infection, unspecified type: Will let URI run its course. Will treat symptoms with cough suppressant, corticosteroid nasal spray and antihistamine. Allergy season could be worsening symptoms. Hydration recommended. SXS to return discussed. Pt verbalized understanding.   -     benzonatate (TESSALON) 100 MG capsule; Take 1-2 capsules by mouth 3 times daily as needed for Cough, Disp-21 capsule, R-0Normal  -     fluticasone (FLONASE) 50 MCG/ACT nasal spray; 2 sprays by Each Nostril route daily, Disp-48 g, R-1Normal  -     cetirizine (ZYRTEC) 10 MG tablet; Take 1 tablet by mouth daily, Disp-30 tablet, R-0Normal  2. Primary hypertension: At goal. Continue with Amlodipine and Metoprolol.       Benefits, risks, possible drug interactions, and side effects of all new medications were reviewed with the patient. Pt verbalized understanding.    An electronic signature was used to authenticate this note.  Nini Newman, APRN - CNP  4/11/2024      Follow-up and Dispositions    Return if symptoms worsen or fail to improve.          History of Present Illness   Chief Complaint     Sophia Ceja is a 66 y.o. female here for had concerns including Cough.   Marcial Colmenares presents today with reports of nasal congestion with yellow mucus, dry cough, R side sore throat, fatigue, onset 10 days. Has taken Tylenol for pain as needed. Symptoms had not improved. She was diagnosed with bronchitis a month ago, treated with z-pack which she completed with compliance. Denies sick contacts. Has not tested for COVID-19 recently. Denies fever, chills, shortness of breath. Denies history of asthma or environmental allergies.      Hypertension ROS: taking medications as instructed, no medication side effects noted, home BP monitoring in range of 130's systolic over 80's diastolic, no TIA's, no chest pain on exertion, no dyspnea on exertion, no swelling of ankles.       Review of  Weak peripheral pulses

## 2025-07-02 NOTE — PHYSICAL THERAPY INITIAL EVALUATION ADULT - LEVEL OF INDEPENDENCE: SCOOT/BRIDGE, REHAB EVAL
Orders:  •  Dihydroergotamine Mesylate HFA (Trudhesa) 0.725 MG/ACT AERS; 0.725 mg into each nostril if needed (migraine) Use at onset of migraine.  May repeat in 1 hour if needed.  Limit of 3 a week or 8 a month.  Do not use within 24 hours of a triptan.   supervision